# Patient Record
Sex: MALE | Race: WHITE | NOT HISPANIC OR LATINO | Employment: OTHER | URBAN - METROPOLITAN AREA
[De-identification: names, ages, dates, MRNs, and addresses within clinical notes are randomized per-mention and may not be internally consistent; named-entity substitution may affect disease eponyms.]

---

## 2017-04-17 ENCOUNTER — ALLSCRIPTS OFFICE VISIT (OUTPATIENT)
Dept: OTHER | Facility: OTHER | Age: 72
End: 2017-04-17

## 2017-06-07 DIAGNOSIS — Z12.5 ENCOUNTER FOR SCREENING FOR MALIGNANT NEOPLASM OF PROSTATE: ICD-10-CM

## 2017-06-13 ENCOUNTER — ALLSCRIPTS OFFICE VISIT (OUTPATIENT)
Dept: OTHER | Facility: OTHER | Age: 72
End: 2017-06-13

## 2017-08-28 ENCOUNTER — HOSPITAL ENCOUNTER (OUTPATIENT)
Dept: NON INVASIVE DIAGNOSTICS | Facility: CLINIC | Age: 72
Discharge: HOME | End: 2017-08-28
Payer: MEDICARE

## 2017-08-28 DIAGNOSIS — I65.29 OCCLUSION AND STENOSIS OF UNSPECIFIED CAROTID ARTERY: ICD-10-CM

## 2017-08-28 PROCEDURE — 93880 EXTRACRANIAL BILAT STUDY: CPT

## 2017-11-07 ENCOUNTER — ALLSCRIPTS OFFICE VISIT (OUTPATIENT)
Dept: OTHER | Facility: OTHER | Age: 72
End: 2017-11-07

## 2018-01-09 NOTE — PSYCH
Psych Med Mgmt    Appearance: was calm and cooperative, adequate hygiene and grooming and good eye contact  Observed mood: euthymic  Observed mood: affect was broad  Speech: a normal rate  Thought processes: coherent/organized  Hallucinations: no hallucinations present  Thought Content: no delusions  Abnormal Thoughts: The patient has no suicidal thoughts and no homicidal thoughts  Orientation: The patient is oriented to person, place and time  Recent and Remote Memory: short term memory intact and long term memory intact  Attention Span And Concentration: concentration intact  Insight: Insight intact  Judgment: His judgment was intact  Muscle Strength And Tone  Muscle strength and tone were normal    The patient is experiencing no localized pain  Patient seen for continuing care and pharmacotherapy  He is doing well and remains in full remission  No new health problems  He has a good relationship with his family  Sleep and appetite are normal  His cognition is intact    Assessment    1  Recurrent major depression in full remission (296 36) (F33 42)    Plan    1  ALPRAZolam 0 25 MG Oral Tablet; TAKE 1 TABLET DAILY   2  DULoxetine HCl - 60 MG Oral Capsule Delayed Release Particles (Cymbalta);   TAKE 1 CAPSULE DAILY    Review of Systems    Constitutional: No fever, no chills, feels well, no tiredness, no recent weight gain or loss  Cardiovascular: no complaints of slow or fast heart rate, no chest pain, no palpitations  Respiratory: no complaints of shortness of breath, no wheezing, no dyspnea on exertion  Gastrointestinal: no complaints of abdominal pain, no constipation, no nausea, no diarrhea, no vomiting  Genitourinary: no complaints of dysuria, no incontinence, no pelvic pain, no urinary frequency  Musculoskeletal: no complaints of arthralgia, no myalgias, no limb pain, no joint stiffness  Integumentary: no complaints of skin rash, no itching, no dry skin  Neurological: no complaints of headache, no confusion, no numbness, no dizziness  Active Problems    1  Abdominal pain (789 00) (R10 9)   2  Aneurysm of abdominal aorta (441 4) (I71 4)   3  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 2) (I71 2)   4  Aneurysm, 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)   5  Dissecting aortic aneurysm, 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)   6  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 01)   7  Encounter for prostate cancer screening (V76 44) (Z12 5)   8  Hypertension (401 9) (I10)   9  Obstructive sleep apnea (327 23) (G47 33)   10  Renal cyst, acquired (593 2) (N28 1)   11  Shortness of breath (786 05) (R06 02)   12  Status post thoracic aortic aneurysm repair (V45 89) (Z98 89,Z86 79)    Past Medical History    1  History of Anxiety (300 00) (F41 9)   2  History of Depression (311) (F32 9)   3  History of hyperlipidemia (V12 29) (Z86 39)   4  History of hypertension (V12 59) (Z86 79)   5  History of low back pain (V13 59) (Z87 39)   6  History of Nervous System Complication (924 98)    Allergies    1  Ativan TABS    Current Meds   1  ALPRAZolam 0 25 MG Oral Tablet; TAKE 1 TABLET DAILY; Therapy: 98NKH5969 to Recorded   2  Aspirin 81 MG Oral Tablet; TAKE 1 TABLET DAILY; Therapy: 83ERR3279 to Recorded   3  Atorvastatin Calcium 40 MG Oral Tablet; TAKE 1 TABLET DAILY; Therapy: 74RLJ2498 to (Evaluate:24Mar2016) Recorded   4  Cymbalta 60 MG Oral Capsule Delayed Release Particles; TAKE 1 CAPSULE DAILY; Therapy: 30MTT3220 to Recorded   5  Labetalol HCl - 100 MG Oral Tablet; 1/2 TABLET TWICE DAILY; Therapy: 56YVO6303 to Recorded   6  Omeprazole 40 MG Oral Capsule Delayed Release; TAKE 1 CAPSULE DAILY; Therapy: 41ZMX5865 to (Evaluate:24Mar2016) Recorded    Family Psych History    1  Family history of Stroke Syndrome (V17 1)    2   Family history of No Significant Family History    Social History    · Denied: History of Alcohol   · Daily Coffee Consumption (2  Cups/Day)   · Denied: Drug Use (707 90)   · Former smoker (T87 88) (W43 353)    End of Encounter Meds    1  ALPRAZolam 0 25 MG Oral Tablet; TAKE 1 TABLET DAILY; Therapy: 29VBQ6605 to (Evaluate:29Apr2016); Last Rx:30Mar2016 Ordered   2  DULoxetine HCl - 60 MG Oral Capsule Delayed Release Particles (Cymbalta); TAKE 1   CAPSULE DAILY; Therapy: 69JCO3549 to (Evaluate:25Mar2017)  Requested for: 59KHW0940; Last   Rx:30Mar2016 Ordered    3  Aspirin 81 MG Oral Tablet; TAKE 1 TABLET DAILY; Therapy: 12GTX3057 to Recorded   4  Atorvastatin Calcium 40 MG Oral Tablet; TAKE 1 TABLET DAILY; Therapy: 99AXE0558 to (Evaluate:24Mar2016) Recorded   5  Labetalol HCl - 100 MG Oral Tablet; 1/2 TABLET TWICE DAILY; Therapy: 08MWU1484 to Recorded   6  Omeprazole 40 MG Oral Capsule Delayed Release; TAKE 1 CAPSULE DAILY;    Therapy: 13ORV0612 to (Evaluate:24Mar2016) Recorded    Signatures   Electronically signed by : Tali Hills MD; Mar 30 2016  4:23PM EST                       (Author)

## 2018-01-12 VITALS
HEIGHT: 71 IN | DIASTOLIC BLOOD PRESSURE: 80 MMHG | SYSTOLIC BLOOD PRESSURE: 122 MMHG | WEIGHT: 229 LBS | HEART RATE: 76 BPM | BODY MASS INDEX: 32.06 KG/M2

## 2018-01-12 NOTE — PSYCH
Psych Med Mgmt    Appearance: was calm and cooperative, adequate hygiene and grooming and good eye contact  Observed mood: euthymic  Observed mood: affect was broad  Speech: a normal rate  Thought processes: coherent/organized  Hallucinations: no hallucinations present  Thought Content: no delusions  Abnormal Thoughts: The patient has no suicidal thoughts and no homicidal thoughts  Orientation: The patient is oriented to person, place and time  Recent and Remote Memory: short term memory intact and long term memory intact  Attention Span And Concentration: concentration intact  Insight: Insight intact  Judgment: His judgment was intact  Muscle Strength And Tone  Muscle strength and tone were normal  Normal gait and station  The patient is experiencing no localized pain  Treatment Recommendations: no change  RTO in 6 month  He reports normal appetite, normal energy level, no weight change and normal number of sleep hours  seen for depression  Remains in full remission  Other than vitamin D def he has not had any new health problems  sleeping and eating well  No psychotic symptom  Assessment    1  Vitamin D deficiency (268 9) (E55 9)    Plan    1  ALPRAZolam 0 25 MG Oral Tablet; TAKE 1 TABLET DAILY   2  DULoxetine HCl - 60 MG Oral Capsule Delayed Release Particles (Cymbalta);   take 1 capsule by mouth once daily    3  Vitamin D3 5000 UNIT Oral Tablet Dispersible; One a day   4  Vitamin D3 83291 UNIT Oral Capsule; one every week    Review of Systems    Constitutional: No fever, no chills, feels well, no tiredness, no recent weight gain or loss  Cardiovascular: no complaints of slow or fast heart rate, no chest pain, no palpitations  Respiratory: no complaints of shortness of breath, no wheezing, no dyspnea on exertion  Gastrointestinal: no complaints of abdominal pain, no constipation, no nausea, no diarrhea, no vomiting     Musculoskeletal: no complaints of arthralgia, no myalgias, no limb pain, no joint stiffness  Integumentary: no complaints of skin rash, no itching, no dry skin  Neurological: no complaints of headache, no confusion, no numbness, no dizziness  Active Problems    1  Abdominal pain (789 00) (R10 9)   2  Aneurysm of abdominal aorta (441 4) (I71 4)   3  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 2) (I71 2)   4  Aneurysm, 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)   5  Carotid atherosclerosis (433 10) (I65 29)   6  Dissecting aortic aneurysm, 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)   7  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 01)   8  Encounter for prostate cancer screening (V76 44) (Z12 5)   9  Hypertension (401 9) (I10)   10  Obstructive sleep apnea (327 23) (G47 33)   11  Prominent popliteal pulse (785 9) (R09 89)   12  Recurrent major depression in full remission (296 36) (F33 42)   13  Renal cyst, acquired (593 2) (N28 1)   14  Shortness of breath (786 05) (R06 02)   15  Status post thoracic aortic aneurysm repair (V45 89) (Z98 890,Z86 79)    Past Medical History    1  History of Anxiety (300 00) (F41 9)   2  History of Depression (311) (F32 9)   3  History of hyperlipidemia (V12 29) (Z86 39)   4  History of hypertension (V12 59) (Z86 79)   5  History of low back pain (V13 59) (Z87 39)   6  History of Nervous System Complication (928 61)    Allergies    1  Ativan TABS    Current Meds   1  ALPRAZolam 0 25 MG Oral Tablet; TAKE 1 TABLET DAILY; Therapy: 20RDK0976 to (Evaluate:01Nmk7068); Last Rx:45Ste3574 Ordered   2  Aspirin 81 MG TABS; TAKE 1 TABLET DAILY; Therapy: 47VXF3822 to Recorded   3  Atorvastatin Calcium 40 MG Oral Tablet; TAKE 1 TABLET DAILY; Therapy: 69HUS1533 to (Evaluate:24Mar2016) Recorded   4  DULoxetine HCl - 60 MG Oral Capsule Delayed Release Particles; take 1 capsule by   mouth once daily; Therapy: 24CBU3193 to (Braden Perrin)  Requested for: 55VJD7718; Last   Rx:07Mar2017 Ordered   5   Labetalol HCl - 100 MG Oral Tablet; 1/2 TABLET TWICE DAILY; Therapy: 56LIX2015 to Recorded   6  Omeprazole 40 MG Oral Capsule Delayed Release; TAKE 1 CAPSULE DAILY; Therapy: 23ONI8189 to (Evaluate:24Mar2016) Recorded    Family Psych History  Mother    1  Family history of Stroke Syndrome (V17 1)  Family History    2  Family history of No Significant Family History    Social History    · Denied: History of Alcohol   · Daily Coffee Consumption (2  Cups/Day)   · Denied: Drug Use (305 90)   · Former smoker (V15 82) (N83 474)    End of Encounter Meds    1  ALPRAZolam 0 25 MG Oral Tablet; TAKE 1 TABLET DAILY; Therapy: 19DWD7039 to (Evaluate:14Oct2017); Last Rx:05Rjo8925 Ordered   2  DULoxetine HCl - 60 MG Oral Capsule Delayed Release Particles (Cymbalta); take 1   capsule by mouth once daily; Therapy: 42PXW9617 to (Evaluate:12Apr2018)  Requested for: 43Ikl9080; Last   Rx:77Wgp8702 Ordered    3  Vitamin D3 5000 UNIT Oral Tablet Dispersible; One a day; Therapy: 22Bew2560 to (Last Rx:28Xok4976) Ordered   4  Vitamin D3 44842 UNIT Oral Capsule; one every week; Therapy: 91Qvt7423 to (Last Rx:32Efh7367) Ordered    5  Aspirin 81 MG TABS; TAKE 1 TABLET DAILY; Therapy: 98NKQ2807 to Recorded   6  Atorvastatin Calcium 40 MG Oral Tablet; TAKE 1 TABLET DAILY; Therapy: 57OKT6165 to (Evaluate:24Mar2016) Recorded   7  Labetalol HCl - 100 MG Oral Tablet; 1/2 TABLET TWICE DAILY; Therapy: 22XPG5728 to Recorded   8  Omeprazole 40 MG Oral Capsule Delayed Release; TAKE 1 CAPSULE DAILY;    Therapy: 67KCN7402 to (Evaluate:24Mar2016) Recorded    Future Appointments    Date/Time Provider Specialty Site   06/13/2017 02:45 PM Bhavana Kothari [facility] Urology [address] [facility] [address] [facility]     Signatures   Electronically signed by : Shelbi Alejo MD; Apr 17 2017  4:08PM EST                       (Author)

## 2018-01-16 NOTE — PSYCH
Psych Med Mgmt    Appearance: was calm and cooperative, adequate hygiene and grooming and good eye contact  Observed mood: affect was broad  Speech: a normal rate and fluent  Thought processes: coherent/organized  Hallucinations: no hallucinations present  Thought Content: no delusions  Abnormal Thoughts: The patient has no suicidal thoughts and no homicidal thoughts  Orientation: The patient is oriented to person, place and time  Recent and Remote Memory: short term memory intact and long term memory intact  Attention Span And Concentration: concentration intact  Insight: Insight intact  Judgment: His judgment was intact  Muscle Strength And Tone  Muscle strength and tone were normal  Normal gait and station  Language: no difficulty naming common objects  The patient is experiencing no localized pain  Treatment Recommendations: continue with same meds an RTO in 6 months  Risks, Benefits And Possible Side Effects Of Medications: Risks, benefits, and possible side effects of medications explained to patient and patient verbalizes understanding  He reports normal appetite, normal energy level, no weight change and normal number of sleep hours  seen for recurrent depression  He remains in full remission  Sleeps well but his appetite has diminished in the past several years  Keeps himself busy around the house  Still involved in hi-school sports  Good relationship with the family  Assessment    1  Recurrent major depression in full remission (296 36) (F33 42)    Plan    1  ALPRAZolam 0 25 MG Oral Tablet; TAKE 1 TABLET DAILY   2  DULoxetine HCl - 60 MG Oral Capsule Delayed Release Particles (Cymbalta);   take 1 capsule by mouth once daily    Review of Systems    Constitutional: No fever, no chills, feels well, no tiredness, no recent weight gain or loss  Cardiovascular: no complaints of slow or fast heart rate, no chest pain, no palpitations     Respiratory: no complaints of shortness of breath, no wheezing, no dyspnea on exertion  Gastrointestinal: no complaints of abdominal pain, no constipation, no nausea, no diarrhea, no vomiting  Genitourinary: no complaints of dysuria, no incontinence, no pelvic pain, no urinary frequency  Musculoskeletal: no complaints of arthralgia, no myalgias, no limb pain, no joint stiffness  Integumentary: no complaints of skin rash, no itching, no dry skin  Neurological: no complaints of headache, no confusion, no numbness, no dizziness  Active Problems    1  Abdominal pain (789 00) (R10 9)   2  Aneurysm of abdominal aorta (441 4) (I71 4)   3  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 2) (I71 2)   4  Aneurysm, 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)   5  Carotid atherosclerosis (433 10) (I65 29)   6  Dissecting aortic aneurysm, 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)   7  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 01)   8  Encounter for prostate cancer screening (V76 44) (Z12 5)   9  Hypertension (401 9) (I10)   10  Obstructive sleep apnea (327 23) (G47 33)   11  Prominent popliteal pulse (785 9) (R09 89)   12  Recurrent major depression in full remission (296 36) (F33 42)   13  Renal cyst, acquired (593 2) (N28 1)   14  Shortness of breath (786 05) (R06 02)   15  Status post thoracic aortic aneurysm repair (V45 89) (Z98 890,Z86 79)   16  Vitamin D deficiency (268 9) (E55 9)    Past Medical History    1  History of Anxiety (300 00) (F41 9)   2  History of Depression (311) (F32 9)   3  History of hyperlipidemia (V12 29) (Z86 39)   4  History of hypertension (V12 59) (Z86 79)   5  History of low back pain (V13 59) (Z87 39)   6  History of Nervous System Complication (576 33)    Allergies    1  Ativan TABS    Current Meds   1  ALPRAZolam 0 25 MG Oral Tablet; TAKE 1 TABLET DAILY; Therapy: 13NAD1043 to (Evaluate:97Qrj9543); Last Rx:28Kca9422 Ordered   2  Aspirin 81 MG TABS; TAKE 1 TABLET DAILY; Therapy: 50TBS8579 to Recorded   3   Atorvastatin Calcium 40 MG Oral Tablet; TAKE 1 TABLET DAILY; Therapy: 17DIZ8126 to (Evaluate:24Mar2016) Recorded   4  DULoxetine HCl - 60 MG Oral Capsule Delayed Release Particles; take 1 capsule by   mouth once daily; Therapy: 66ZLA9547 to (Evaluate:12Apr2018)  Requested for: 92Qdm5821; Last   Rx:17Apr2017 Ordered   5  Labetalol HCl - 100 MG Oral Tablet; 1/2 TABLET TWICE DAILY; Therapy: 17UOP6158 to Recorded   6  Omeprazole 40 MG Oral Capsule Delayed Release; TAKE 1 CAPSULE DAILY; Therapy: 44YQS5373 to (Evaluate:24Mar2016) Recorded   7  Vitamin D3 5000 UNIT Oral Tablet Disintegrating; One a day; Therapy: 54Wyj1765 to (Last Rx:17Apr2017) Ordered   8  Vitamin D3 35057 UNIT Oral Capsule; one every week; Therapy: 29Kar8491 to (Last Rx:17Apr2017) Ordered    Family Psych History  Mother    1  Family history of Stroke Syndrome (V17 1)  Family History    2  Family history of No Significant Family History    Social History    · Denied: History of Alcohol   · Daily Coffee Consumption (2  Cups/Day)   · Denied: Drug Use (305 90)   · Former smoker (V15 82) (Q26 108)    End of Encounter Meds    1  ALPRAZolam 0 25 MG Oral Tablet; TAKE 1 TABLET DAILY; Therapy: 73VRK0005 to (Evaluate:06May2018); Last Rx:07Nov2017 Ordered   2  DULoxetine HCl - 60 MG Oral Capsule Delayed Release Particles (Cymbalta); take 1   capsule by mouth once daily; Therapy: 22ESA6300 to (Iram Del Cid)  Requested for: 28LVY7152; Last   Rx:07Nov2017 Ordered    3  Vitamin D3 5000 UNIT Oral Tablet Disintegrating; One a day; Therapy: 36Gqq9623 to (Last Rx:17Apr2017) Ordered   4  Vitamin D3 25439 UNIT Oral Capsule; one every week; Therapy: 84Ayz1546 to (Last Rx:17Apr2017) Ordered    5  Aspirin 81 MG TABS; TAKE 1 TABLET DAILY; Therapy: 70LOI4708 to Recorded   6  Atorvastatin Calcium 40 MG Oral Tablet; TAKE 1 TABLET DAILY; Therapy: 74ZDD7932 to (Evaluate:24Mar2016) Recorded   7  Labetalol HCl - 100 MG Oral Tablet; 1/2 TABLET TWICE DAILY;    Therapy: 24GYA7019 to Recorded   8  Omeprazole 40 MG Oral Capsule Delayed Release; TAKE 1 CAPSULE DAILY;    Therapy: 11DDL1443 to (Evaluate:24Mar2016) Recorded    Signatures   Electronically signed by : Jonathan Carson MD; Nov 7 2017  4:49PM EST                       (Author)

## 2018-01-17 NOTE — PSYCH
Psych Med Mgmt    Treatment Recommendations: same meds and return to the office in 6 months  Risks, Benefits And Possible Side Effects Of Medications: Risks, benefits, and possible side effects of medications explained to patient and patient verbalizes understanding  He reports normal appetite, normal energy level, no weight change and normal number of sleep hours  seen for recurrent depression  Has remained in remission  No new health problems  sleeping and eating well  no psychotic experiences  stable family relationships  no side effects with meds  Assessment    1  Recurrent major depression in full remission (296 36) (F33 42)    Plan    1  ALPRAZolam 0 25 MG Oral Tablet; TAKE 1 TABLET DAILY   2  DULoxetine HCl - 60 MG Oral Capsule Delayed Release Particles (Cymbalta);   TAKE 1 CAPSULE DAILY    Review of Systems    Constitutional: No fever, no chills, feels well, no tiredness, no recent weight gain or loss  Cardiovascular: no complaints of slow or fast heart rate, no chest pain, no palpitations  Respiratory: no complaints of shortness of breath, no wheezing, no dyspnea on exertion  Gastrointestinal: no complaints of abdominal pain, no constipation, no nausea, no diarrhea, no vomiting  Genitourinary: no complaints of dysuria, no incontinence, no pelvic pain, no urinary frequency  Musculoskeletal: no complaints of arthralgia, no myalgias, no limb pain, no joint stiffness  Integumentary: no complaints of skin rash, no itching, no dry skin  Active Problems    1  Abdominal pain (789 00) (R10 9)   2  Aneurysm of abdominal aorta (441 4) (I71 4)   3  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 2) (I71 2)   4  Aneurysm, 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)   5  Carotid atherosclerosis (433 10) (I65 29)   6  Dissecting aortic aneurysm, 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)   7  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 01)   8  Encounter for prostate cancer screening (V76 44) (Z12 5)   9   Hypertension (401  9) (I10)   10  Obstructive sleep apnea (327 23) (G47 33)   11  Prominent popliteal pulse (785 9) (R09 89)   12  Recurrent major depression in full remission (296 36) (F33 42)   13  Renal cyst, acquired (593 2) (N28 1)   14  Shortness of breath (786 05) (R06 02)   15  Status post thoracic aortic aneurysm repair (V45 89) (Z98 890,Z86 79)    Past Medical History    1  History of Anxiety (300 00) (F41 9)   2  History of Depression (311) (F32 9)   3  History of hyperlipidemia (V12 29) (Z86 39)   4  History of hypertension (V12 59) (Z86 79)   5  History of low back pain (V13 59) (Z87 39)   6  History of Nervous System Complication (688 18)    Allergies    1  Ativan TABS    Current Meds   1  ALPRAZolam 0 25 MG Oral Tablet; TAKE 1 TABLET DAILY; Therapy: 33NSS0040 to (Evaluate:29Apr2016); Last Rx:30Mar2016 Ordered   2  Aspirin 81 MG TABS; TAKE 1 TABLET DAILY; Therapy: 27ALV3404 to Recorded   3  Atorvastatin Calcium 40 MG Oral Tablet; TAKE 1 TABLET DAILY; Therapy: 43ZUE4633 to (Evaluate:24Mar2016) Recorded   4  DULoxetine HCl - 60 MG Oral Capsule Delayed Release Particles; TAKE 1 CAPSULE   DAILY; Therapy: 93DQX4408 to (Evaluate:25Mar2017)  Requested for: 56BOL0722; Last   Rx:30Mar2016 Ordered   5  Labetalol HCl - 100 MG Oral Tablet; 1/2 TABLET TWICE DAILY; Therapy: 34DPZ8460 to Recorded   6  Omeprazole 40 MG Oral Capsule Delayed Release; TAKE 1 CAPSULE DAILY; Therapy: 11RLS1677 to (Evaluate:24Mar2016) Recorded    Family Psych History  Mother    1  Family history of Stroke Syndrome (V17 1)  Family History    2  Family history of No Significant Family History    Social History    · Denied: History of Alcohol   · Daily Coffee Consumption (2  Cups/Day)   · Denied: Drug Use (305 90)   · Former smoker (V15 82) (R62 818)    End of Encounter Meds    1  ALPRAZolam 0 25 MG Oral Tablet; TAKE 1 TABLET DAILY; Therapy: 79ZDG8980 to (Evaluate:22Apr2017); Last Rx:24Oct2016 Ordered   2   DULoxetine HCl - 60 MG Oral Capsule Delayed Release Particles (Cymbalta); TAKE 1   CAPSULE DAILY; Therapy: 89GTF2151 to (Evaluate:19Oct2017)  Requested for: 76LBX4117; Last   Rx:24Oct2016 Ordered    3  Aspirin 81 MG TABS; TAKE 1 TABLET DAILY; Therapy: 79XAZ6170 to Recorded   4  Atorvastatin Calcium 40 MG Oral Tablet; TAKE 1 TABLET DAILY; Therapy: 74KPY0728 to (Evaluate:24Mar2016) Recorded   5  Labetalol HCl - 100 MG Oral Tablet; 1/2 TABLET TWICE DAILY; Therapy: 49SEE4482 to Recorded   6  Omeprazole 40 MG Oral Capsule Delayed Release; TAKE 1 CAPSULE DAILY;    Therapy: 02MLR8129 to (Evaluate:24Mar2016) Recorded    Future Appointments    Date/Time Provider Specialty Site   06/13/2017 02:45 PM Ellie Pandya [facility] Urology ST [address] [place] [address] [address] [facility]     Signatures   Electronically signed by : Consuelo Rosario MD; Oct 24 2016  3:07PM EST                       (Author)

## 2018-03-20 DIAGNOSIS — Z12.5 ENCOUNTER FOR SCREENING FOR MALIGNANT NEOPLASM OF PROSTATE: ICD-10-CM

## 2018-04-13 ENCOUNTER — TRANSCRIBE ORDERS (OUTPATIENT)
Dept: VASCULAR SURGERY | Facility: CLINIC | Age: 73
End: 2018-04-13

## 2018-04-13 ENCOUNTER — TELEPHONE (OUTPATIENT)
Dept: VASCULAR SURGERY | Facility: CLINIC | Age: 73
End: 2018-04-13

## 2018-04-13 DIAGNOSIS — R09.89 ABNORMAL CHEST SOUNDS: Primary | ICD-10-CM

## 2018-04-13 DIAGNOSIS — I71.03 DISSECTION OF AORTA, THORACOABDOMINAL (HCC): Primary | ICD-10-CM

## 2018-04-13 DIAGNOSIS — I73.9 PERIPHERAL ARTERIAL DISEASE (HCC): ICD-10-CM

## 2018-04-24 ENCOUNTER — TELEPHONE (OUTPATIENT)
Dept: PSYCHIATRY | Facility: CLINIC | Age: 73
End: 2018-04-24

## 2018-04-24 ENCOUNTER — TELEPHONE (OUTPATIENT)
Dept: PULMONOLOGY | Facility: CLINIC | Age: 73
End: 2018-04-24

## 2018-04-24 NOTE — TELEPHONE ENCOUNTER
S/W Maris from [facility]  Requested Pt's CPAP compliance report and she state it is downloaded from the data card

## 2018-04-26 ENCOUNTER — OFFICE VISIT (OUTPATIENT)
Dept: PULMONOLOGY | Facility: CLINIC | Age: 73
End: 2018-04-26
Payer: MEDICARE

## 2018-04-26 VITALS
TEMPERATURE: 97.6 F | OXYGEN SATURATION: 96 % | SYSTOLIC BLOOD PRESSURE: 122 MMHG | BODY MASS INDEX: 32.14 KG/M2 | HEIGHT: 71 IN | WEIGHT: 229.6 LBS | DIASTOLIC BLOOD PRESSURE: 62 MMHG | HEART RATE: 62 BPM

## 2018-04-26 DIAGNOSIS — G47.33 OSA (OBSTRUCTIVE SLEEP APNEA): Primary | ICD-10-CM

## 2018-04-26 PROCEDURE — 99213 OFFICE O/P EST LOW 20 MIN: CPT | Performed by: INTERNAL MEDICINE

## 2018-04-26 RX ORDER — CLARITHROMYCIN 500 MG/1
TABLET, COATED ORAL
Refills: 0 | COMMUNITY
Start: 2018-03-23 | End: 2018-04-26

## 2018-04-26 RX ORDER — LABETALOL 100 MG/1
0.5 TABLET, FILM COATED ORAL 2 TIMES DAILY
COMMUNITY
Start: 2015-03-30 | End: 2021-08-23 | Stop reason: SDUPTHER

## 2018-04-26 RX ORDER — LABETALOL 200 MG/1
TABLET, FILM COATED ORAL
Refills: 0 | COMMUNITY
Start: 2018-03-28 | End: 2018-04-26

## 2018-04-26 RX ORDER — DULOXETIN HYDROCHLORIDE 60 MG/1
CAPSULE, DELAYED RELEASE ORAL
Refills: 0 | COMMUNITY
Start: 2018-03-13 | End: 2018-12-07 | Stop reason: SDUPTHER

## 2018-04-26 RX ORDER — ATORVASTATIN CALCIUM 40 MG/1
40 TABLET, FILM COATED ORAL DAILY
Refills: 0 | COMMUNITY
Start: 2018-04-09

## 2018-04-26 RX ORDER — CHOLECALCIFEROL (VITAMIN D3) 1250 MCG
50000 CAPSULE ORAL WEEKLY
COMMUNITY
Start: 2017-04-17 | End: 2022-01-19 | Stop reason: ALTCHOICE

## 2018-04-26 RX ORDER — ALPRAZOLAM 0.25 MG/1
TABLET ORAL
Refills: 0 | COMMUNITY
Start: 2018-02-04 | End: 2018-10-22 | Stop reason: SDUPTHER

## 2018-04-26 NOTE — PROGRESS NOTES
Office Progress Note - Pulmonary    Gar Lorne  75 y o  male MRN: [id number]    Encounter: [id number]      Assessment:  · Obstructive sleep apnea  Well treated  Plan:   · I have reordered a new CPAP machine for him  · Continue nocturnal CPAP  · Follow-up in 1 year  Discussion:   The patient has obstructive sleep apnea is well treated  The patient is very compliant with nocturnal CPAP  I have mentioned him if he notices any symptoms of tiredness and hypersomnolence during the day he at that point he may need to have a CPAP titration study  I will see him in 1 year in a follow-up visit  Subjective: The patient is here for a follow-up visit  He is doing fairly well  He denies any chest pain or palpitations  No cough or sputum production  In the winter he had severe bronchitis which lasted for 2 weeks  He was treated with antibiotics  He is compliant with his nocturnal CPAP  He denies daytime hypersomnolence  Review of systems:  A 12 point system review is done and aside from what is stated above the rest of the review of systems is negative  Vitals: Blood pressure 122/62, pulse 62, temperature 97 6 °F (36 4 °C), temperature source Tympanic, height 5' 11" (1 803 m), weight 104 kg (229 lb 9 6 oz), SpO2 96 %  ,     Physical Exam  Gen: Awake, alert, oriented x 3, no acute distress  HEENT: Mucous membranes moist, no oral lesions, no thrush  NECK: No accessory muscle use, JVP not elevated  Cardiac: Regular, single S1, single S2, no murmurs, no rubs, no gallops  Lungs:  Clear breath sounds  Abdomen: normoactive bowel sounds, soft nontender, nondistended, no rebound or rigidity, no guarding  Extremities: no cyanosis, no clubbing, no edema  Neuro:  Grossly nonfocal   Skin:  No rash

## 2018-04-27 ENCOUNTER — DOCUMENTATION (OUTPATIENT)
Dept: PULMONOLOGY | Facility: CLINIC | Age: 73
End: 2018-04-27

## 2018-05-08 NOTE — TELEPHONE ENCOUNTER
Tried contacting the patient several times with no luck  I sent out a letter for the patient to give us a call and schedule his ultrasound that he is due for

## 2018-05-15 DIAGNOSIS — I65.23 BILATERAL CAROTID ARTERY STENOSIS: Primary | ICD-10-CM

## 2018-05-17 ENCOUNTER — TELEPHONE (OUTPATIENT)
Dept: PULMONOLOGY | Facility: CLINIC | Age: 73
End: 2018-05-17

## 2018-05-31 ENCOUNTER — OFFICE VISIT (OUTPATIENT)
Dept: PSYCHIATRY | Facility: CLINIC | Age: 73
End: 2018-05-31
Payer: MEDICARE

## 2018-05-31 DIAGNOSIS — F33.42 RECURRENT MAJOR DEPRESSION IN FULL REMISSION (HCC): Primary | ICD-10-CM

## 2018-05-31 PROCEDURE — 99213 OFFICE O/P EST LOW 20 MIN: CPT | Performed by: PSYCHIATRY & NEUROLOGY

## 2018-05-31 NOTE — PSYCH
Patient ID: Kameron Sinha  is a 75 y o  male  HPI ROS Appetite Changes and Sleep: normal appetite, normal energy level, no weight change and normal number of sleep hours    Review Of Systems:     Mood Normal   Thought Content Normal   General Normal    Gastrointestinal Normal   Neurological Normal        Laboratory Results: No results found for this or any previous visit  Subjective:   doing well and has no complaints  sleeping and eating well    Good relationship with his family  No health problems        Objective:   Remains in remission    Mental status:  Appearance calm and cooperative , adequate hygiene and grooming and good eye contact    Mood euthymic   Affect affect appropriate    Speech Normal rate and Normal volume   Thought Processes coherent/organized   Hallucinations no hallucinations present    Thought Content no delusional thoughts verbalized   Abnormal Thoughts no suicidal thoughts    Orientation A+O x 3   Memory function Not tested   Attention Span concentration intact   Intellect Not Formally Assessed   Insight Insight intact   Judgement judgment was intact   Muscle Strength Muscle strength and tone were normal and Normal gait    Language no difficulty naming common objects and no difficulty repeating a phrase    Pain none       Assessment/Plan:       Diagnoses and all orders for this visit:    Recurrent major [facility] full [facility] ([facility] )            Treatment Recommendations- Risks Benefits      Risks, Benefits And Possible Side Effects Of Medications:  Risks, benefits, and possible side effects of medications explained to patient and patient verbalizes understanding

## 2018-06-13 ENCOUNTER — HOSPITAL ENCOUNTER (OUTPATIENT)
Dept: NON INVASIVE DIAGNOSTICS | Facility: CLINIC | Age: 73
Discharge: HOME | End: 2018-06-13
Payer: MEDICARE

## 2018-06-13 DIAGNOSIS — I71.03 DISSECTION OF AORTA, THORACOABDOMINAL (HCC): ICD-10-CM

## 2018-06-13 DIAGNOSIS — I73.9 PERIPHERAL ARTERIAL DISEASE (HCC): ICD-10-CM

## 2018-06-13 PROCEDURE — 93922 UPR/L XTREMITY ART 2 LEVELS: CPT | Performed by: SURGERY

## 2018-06-13 PROCEDURE — 93925 LOWER EXTREMITY STUDY: CPT | Performed by: SURGERY

## 2018-06-13 PROCEDURE — 93925 LOWER EXTREMITY STUDY: CPT

## 2018-06-13 PROCEDURE — 93923 UPR/LXTR ART STDY 3+ LVLS: CPT

## 2018-06-13 PROCEDURE — 93978 VASCULAR STUDY: CPT

## 2018-06-13 PROCEDURE — 93975 VASCULAR STUDY: CPT | Performed by: SURGERY

## 2018-06-27 NOTE — PROGRESS NOTES
6/28/2018    [address] [place] [address]   3/68/1869  [id number]    Discussion and Plan    Patient continues to do well from a urinary standpoint  Provided scripts for PSA now and again in 1 year to ensure copies are delivered  We will otherwise return then for routine examination  All questions answered at this time  1  Renal cyst, acquired  - PSA Total, Diagnostic; Future  - PSA Total, Diagnostic; Future    Assessment      Patient Active Problem List   Diagnosis    Recurrent major depression in full remission ([facility] )    Renal cyst, acquired       History of Present Illness    Patient is seen in follow-up for an established history of BPH, renal cyst, and prostate cancer screening  He denies any changes in urinary pattern noting good flow with complete bladder emptying sensation  No nocturia  Denies interval hematuria urinary tract infections  States his PSAs have been checked by primary physician  I have no records available for review  Urinary Symptom Assessment        Past Medical History  Past Medical History:   Diagnosis Date    Anxiety     Depression     Hyperlipidemia     Hypertension     Kidney stone     Renal cyst     Renal cyst     Sleep apnea        Past Social History  Past Surgical History:   Procedure Laterality Date    ABDOMINAL AORTIC ANEURYSM REPAIR      CELIAC ARTERY STENT      KIDNEY STONE SURGERY         Past Family History  Family History   Problem Relation Age of Onset    Stroke Mother     Prostate cancer Father        Past Social history  Social History     Social History    Marital status: /Civil Union     Spouse name: N/A    Number of children: N/A    Years of education: N/A     Occupational History    Not on file       Social History Main Topics    Smoking status: Former Smoker     Packs/day: 1 00     Years: 24 00     Types: Cigarettes     Start date: 4/26/1959     Quit date: 4/26/1983    Smokeless tobacco: Never Used    Alcohol use No    Drug use: No    Sexual activity: No     Other Topics Concern    Not on file     Social History Narrative    No narrative on file       Current Medications  Current Outpatient Prescriptions   Medication Sig Dispense Refill    ALPRAZolam (XANAX) 0 25 mg tablet   0    aspirin 81 MG tablet Take 1 tablet by mouth daily      atorvastatin (LIPITOR) 40 mg tablet Take 40 mg by mouth daily  0    Cholecalciferol (VITAMIN D3) 06520 units CAPS Take by mouth      DULoxetine (CYMBALTA) 60 mg delayed release capsule   0    labetalol (NORMODYNE) 100 mg tablet Take 0 5 tablets by mouth 2 (two) times a day      omeprazole (PriLOSEC) 40 MG capsule   0     No current facility-administered medications for this visit  Allergies  Allergies   Allergen Reactions    Lorazepam Hyperactivity       Past Medical History, Social History, Family History, medications and allergies were reviewed  Review of Systems  Review of Systems   Constitutional: Negative  HENT: Negative  Eyes: Negative  Respiratory: Negative  Cardiovascular: Negative  Gastrointestinal: Negative  Endocrine: Negative  Genitourinary: Negative for decreased urine volume, difficulty urinating, hematuria and urgency  Musculoskeletal: Negative  Skin: Negative  Neurological: Negative  Hematological: Negative  Psychiatric/Behavioral: Negative  Vitals  Vitals:    06/28/18 0750   BP: 105/70   Pulse: 68   Weight: 103 kg (228 lb)   Height: 5' 11" (1 803 m)         Physical Exam    Physical Exam   Constitutional: He is oriented to person, place, and time  He appears well-developed and well-nourished  HENT:   Head: Normocephalic and atraumatic  Eyes: Pupils are equal, round, and reactive to light  Neck: Normal range of motion  Cardiovascular: Normal rate, regular rhythm and normal heart sounds  Pulmonary/Chest: Effort normal and breath sounds normal  No accessory muscle usage  No respiratory distress  Abdominal: Soft   Normal appearance and bowel sounds are normal  There is no tenderness  Genitourinary: Rectum normal, prostate normal and penis normal  No penile tenderness  Genitourinary Comments: Prostate approximately 40 g  No nodules identified  Musculoskeletal: Normal range of motion  Neurological: He is alert and oriented to person, place, and time  Skin: Skin is warm, dry and intact  Psychiatric: He has a normal mood and affect  His speech is normal  Cognition and memory are normal    Nursing note and vitals reviewed        Results    Below listed labs, pathology results, and radiology images were personally reviewed:    No results found for: PSA  No results found for: GLUCOSE, CALCIUM, NA, K, CO2, CL, BUN, CREATININE  No results found for: WBC, HGB, HCT, MCV, PLT    No results found for this or any previous visit (from the past 1 hour(s)) ]

## 2018-06-28 ENCOUNTER — OFFICE VISIT (OUTPATIENT)
Dept: UROLOGY | Facility: CLINIC | Age: 73
End: 2018-06-28
Payer: MEDICARE

## 2018-06-28 VITALS
HEART RATE: 68 BPM | BODY MASS INDEX: 31.92 KG/M2 | DIASTOLIC BLOOD PRESSURE: 70 MMHG | HEIGHT: 71 IN | SYSTOLIC BLOOD PRESSURE: 105 MMHG | WEIGHT: 228 LBS

## 2018-06-28 DIAGNOSIS — N28.1 RENAL CYST, ACQUIRED: Primary | ICD-10-CM

## 2018-06-28 PROCEDURE — 99214 OFFICE O/P EST MOD 30 MIN: CPT | Performed by: UROLOGY

## 2018-06-28 RX ORDER — OMEPRAZOLE 40 MG/1
CAPSULE, DELAYED RELEASE ORAL
Refills: 0 | COMMUNITY
Start: 2018-04-30 | End: 2021-01-21

## 2018-07-24 ENCOUNTER — TRANSCRIBE ORDERS (OUTPATIENT)
Dept: ADMINISTRATIVE | Facility: HOSPITAL | Age: 73
End: 2018-07-24

## 2018-07-24 DIAGNOSIS — G44.001 INTRACTABLE CLUSTER HEADACHE SYNDROME, UNSPECIFIED CHRONICITY PATTERN: Primary | ICD-10-CM

## 2018-07-28 LAB
25(OH)D3 SERPL-MCNC: 59 NG/ML (ref 30–100)
ALBUMIN SERPL-MCNC: 3.9 G/DL (ref 3.6–5.1)
ALBUMIN/GLOB SERPL: 1.5 (CALC) (ref 1–2.5)
ALP SERPL-CCNC: 104 U/L (ref 40–115)
ALT SERPL-CCNC: 19 U/L (ref 9–46)
AST SERPL-CCNC: 22 U/L (ref 10–35)
BASOPHILS # BLD AUTO: 60 CELLS/UL (ref 0–200)
BASOPHILS NFR BLD AUTO: 1 %
BILIRUB SERPL-MCNC: 0.9 MG/DL (ref 0.2–1.2)
BUN SERPL-MCNC: 20 MG/DL (ref 7–25)
BUN/CREAT SERPL: 16 (CALC) (ref 6–22)
CALCIUM SERPL-MCNC: 9.4 MG/DL (ref 8.6–10.3)
CHLORIDE SERPL-SCNC: 102 MMOL/L (ref 98–110)
CHOLEST SERPL-MCNC: 140 MG/DL
CHOLEST/HDLC SERPL: 3.2 (CALC)
CO2 SERPL-SCNC: 31 MMOL/L (ref 20–31)
CREAT SERPL-MCNC: 1.25 MG/DL (ref 0.7–1.18)
EOSINOPHIL # BLD AUTO: 102 CELLS/UL (ref 15–500)
EOSINOPHIL NFR BLD AUTO: 1.7 %
ERYTHROCYTE [DISTWIDTH] IN BLOOD BY AUTOMATED COUNT: 12.1 % (ref 11–15)
GLOBULIN SER CALC-MCNC: 2.6 G/DL (CALC) (ref 1.9–3.7)
GLUCOSE SERPL-MCNC: 106 MG/DL (ref 65–99)
HBA1C MFR BLD: 5.7 % OF TOTAL HGB
HCT VFR BLD AUTO: 44.1 % (ref 38.5–50)
HDLC SERPL-MCNC: 44 MG/DL
HGB BLD-MCNC: 15.2 G/DL (ref 13.2–17.1)
LDLC SERPL CALC-MCNC: 75 MG/DL (CALC)
LYMPHOCYTES # BLD AUTO: 1134 CELLS/UL (ref 850–3900)
LYMPHOCYTES NFR BLD AUTO: 18.9 %
MCH RBC QN AUTO: 31.7 PG (ref 27–33)
MCHC RBC AUTO-ENTMCNC: 34.5 G/DL (ref 32–36)
MCV RBC AUTO: 92.1 FL (ref 80–100)
MONOCYTES # BLD AUTO: 432 CELLS/UL (ref 200–950)
MONOCYTES NFR BLD AUTO: 7.2 %
NEUTROPHILS # BLD AUTO: 4272 CELLS/UL (ref 1500–7800)
NEUTROPHILS NFR BLD AUTO: 71.2 %
NONHDLC SERPL-MCNC: 96 MG/DL (CALC)
PLATELET # BLD AUTO: 220 THOUSAND/UL (ref 140–400)
PMV BLD REES-ECKER: 8.9 FL (ref 7.5–12.5)
POTASSIUM SERPL-SCNC: 4.6 MMOL/L (ref 3.5–5.3)
PROT SERPL-MCNC: 6.5 G/DL (ref 6.1–8.1)
PSA SERPL-MCNC: 3 NG/ML
RBC # BLD AUTO: 4.79 MILLION/UL (ref 4.2–5.8)
SL AMB EGFR AFRICAN AMERICAN: 65 ML/MIN/1.73M2
SL AMB EGFR NON AFRICAN AMERICAN: 56 ML/MIN/1.73M2
SODIUM SERPL-SCNC: 140 MMOL/L (ref 135–146)
TRIGL SERPL-MCNC: 133 MG/DL
WBC # BLD AUTO: 6 THOUSAND/UL (ref 3.8–10.8)

## 2018-08-02 ENCOUNTER — HOSPITAL ENCOUNTER (OUTPATIENT)
Dept: CT IMAGING | Facility: HOSPITAL | Age: 73
Discharge: HOME | End: 2018-08-02
Payer: MEDICARE

## 2018-08-02 DIAGNOSIS — G44.001 INTRACTABLE CLUSTER HEADACHE SYNDROME, UNSPECIFIED CHRONICITY PATTERN: ICD-10-CM

## 2018-08-02 PROCEDURE — 70450 CT HEAD/BRAIN W/O DYE: CPT

## 2018-08-07 ENCOUNTER — OFFICE VISIT (OUTPATIENT)
Dept: PULMONOLOGY | Facility: CLINIC | Age: 73
End: 2018-08-07
Payer: MEDICARE

## 2018-08-07 VITALS
BODY MASS INDEX: 32.27 KG/M2 | TEMPERATURE: 96.6 F | WEIGHT: 230.5 LBS | HEIGHT: 71 IN | OXYGEN SATURATION: 94 % | HEART RATE: 67 BPM | SYSTOLIC BLOOD PRESSURE: 108 MMHG | DIASTOLIC BLOOD PRESSURE: 66 MMHG

## 2018-08-07 DIAGNOSIS — G47.30 SLEEP APNEA, UNSPECIFIED TYPE: Primary | ICD-10-CM

## 2018-08-07 PROCEDURE — 99213 OFFICE O/P EST LOW 20 MIN: CPT | Performed by: NURSE PRACTITIONER

## 2018-08-07 NOTE — ASSESSMENT & PLAN NOTE
Luis Asher  Has a diagnosis of obstructive sleep apnea and currently a received a new CPAP machine with a new nasal pillow mask  He was seen in the office today for compliance report  He reports being better able to tolerate his current CPAP nasal pillow mask and reports almost 100% compliance  Per complaints reports he has used for 29 of the last 30 days with a average use of 5 hr and 32 min, with an average time in large leak 6 sec, AHI 2 3  His CPAP currently set at 9 cmH20  He reports that he has noticed moderate improvement in daytime sleepiness and snoring  He does continue to have being mild difficulty wearing the mask at night and does typically take the mask off around 03:00  He plans to continue attempting to wear his CPAP mask for longer periods of time, as he has noticed some improvement with use      He will follow up 1 year with Dr Weiner Crest

## 2018-08-07 NOTE — PROGRESS NOTES
Pulmonary Follow Up Note   Chandana Moore  75 y o  male MRN: [id number]  8/7/2018      Assessment:    Sleep apnea   Mark Casas  Has a diagnosis of obstructive sleep apnea and currently a received a new CPAP machine with a new nasal pillow mask  He was seen in the office today for compliance report  He reports being better able to tolerate his current CPAP nasal pillow mask and reports almost 100% compliance  Per complaints reports he has used for 29 of the last 30 days with a average use of 5 hr and 32 min, with an average time in large leak 6 sec, AHI 2 3  His CPAP currently set at 9 cmH20  He reports that he has noticed moderate improvement in daytime sleepiness and snoring  He does continue to have being mild difficulty wearing the mask at night and does typically take the mask off around 03:00  He plans to continue attempting to wear his CPAP mask for longer periods of time, as he has noticed some improvement with use  He will follow up 1 year with Dr Merline Lower:    There are no diagnoses linked to this encounter  Return in about 1 year (around 8/7/2019), or if symptoms worsen or fail to improve  History of Present Illness   HPI:  Chandana Moore  is a 75 y o  male who  Was seen in the office today for follow-up for CPAP compliance  He was recently seen provided a new mask by Dr Gumaro Ivy  He is currently using his CPAP at 9 cmH20,  With a compliance of 90%  He reports moderate improvement in his daytime sleepiness as well as difficulty at night with sleeping  He does  Also report mild difficulty getting accustomed to mask although this is better than his previous full face masks  His only main complaint today is posterior headache extending to his  Anterior head  He is currently being evaluated by neurologist     He  Has completed a head CT which was negative for acute process  He denies further neurologic complaints        He currently denies:  Chest pain or pain inspiration, fevers, chills, night sweats, nausea, vomiting diarrhea headache, dizziness, bronchospasm, hemoptysis, cough, shortness of breath or dyspnea on exertion  Review of Systems   Constitutional: Negative for activity change, appetite change, chills, diaphoresis, fatigue, fever and unexpected weight change  HENT: Negative for postnasal drip, rhinorrhea, sinus pressure and trouble swallowing  Eyes: Negative for pain, discharge and itching  Respiratory: Negative for cough, choking, chest tightness, shortness of breath, wheezing and stridor  Cardiovascular: Negative for chest pain and leg swelling  Gastrointestinal: Negative for abdominal distention and abdominal pain  Endocrine: Negative for cold intolerance and heat intolerance  Genitourinary: Negative for difficulty urinating  Musculoskeletal: Negative for arthralgias and myalgias  Allergic/Immunologic: Negative for environmental allergies and food allergies  Neurological: Positive for headaches  Negative for dizziness, seizures, syncope, speech difficulty, weakness, light-headedness and numbness  Hematological: Does not bruise/bleed easily  Psychiatric/Behavioral: Negative for agitation  All other systems reviewed and are negative        Historical Information   Past Medical History:   Diagnosis Date    Anxiety     Depression     Hyperlipidemia     Hypertension     Kidney stone     Renal cyst     Renal cyst     Sleep apnea      Past Surgical History:   Procedure Laterality Date    ABDOMINAL AORTIC ANEURYSM REPAIR      CELIAC ARTERY STENT      KIDNEY STONE SURGERY       Family History   Problem Relation Age of Onset    Stroke Mother     Prostate cancer Father        History   Smoking Status    Former Smoker    Packs/day: 1 00    Years: 24 00    Types: Cigarettes    Start date: 4/26/1959   [place] Quit date: 4/26/1983   Smokeless Tobacco    Never Used         Meds/Allergies     Current Outpatient Prescriptions:   ALPRAZolam (XANAX) 0 25 mg tablet, , Disp: , Rfl: 0    aspirin 81 MG tablet, Take 1 tablet by mouth daily, Disp: , Rfl:     atorvastatin (LIPITOR) 40 mg tablet, Take 40 mg by mouth daily, Disp: , Rfl: 0    Cholecalciferol (VITAMIN D3) 60679 units CAPS, Take by mouth, Disp: , Rfl:     DULoxetine (CYMBALTA) 60 mg delayed release capsule, , Disp: , Rfl: 0    labetalol (NORMODYNE) 100 mg tablet, Take 0 5 tablets by mouth 2 (two) times a day, Disp: , Rfl:     omeprazole (PriLOSEC) 40 MG capsule, , Disp: , Rfl: 0  Allergies   Allergen Reactions    Lorazepam Hyperactivity       Vitals: Blood pressure 108/66, pulse 67, temperature (!) 96 6 °F (35 9 °C), temperature source Tympanic, height 5' 11" (1 803 m), weight 105 kg (230 lb 8 oz), SpO2 94 %  Body mass index is 32 15 kg/m²  Oxygen Therapy  SpO2: 94 %  Oxygen Therapy: None (Room air)    Physical Exam  Physical Exam   Constitutional: He is oriented to person, place, and time  He appears well-developed and well-nourished  No distress  HENT:   Head: Normocephalic and atraumatic  Eyes: EOM are normal  Pupils are equal, round, and reactive to light  Neck: Normal range of motion  Neck supple  No JVD present  Cardiovascular: Normal rate, regular rhythm, normal heart sounds and intact distal pulses  Exam reveals no gallop and no friction rub  No murmur heard  Pulmonary/Chest: Effort normal and breath sounds normal  He has no decreased breath sounds  He has no wheezes  He has no rhonchi  He has no rales  Abdominal: Soft  Musculoskeletal: Normal range of motion  He exhibits no edema  Lymphadenopathy:     He has no cervical adenopathy  Neurological: He is alert and oriented to person, place, and time  Skin: Skin is warm and dry  Psychiatric: He has a normal mood and affect  His behavior is normal  Judgment and thought content normal    Vitals reviewed  Labs: I have personally reviewed pertinent lab results    Lab Results   Component Value Date    HGB 15 2 07/27/2018    HCT 44 1 07/27/2018    MCV 92 1 07/27/2018     07/27/2018     Lab Results   Component Value Date    CALCIUM 9 4 07/27/2018    BUN 20 07/27/2018    CREATININE 1 25 (H) 07/27/2018     No results found for: IGE  No results found for: ALT, AST, GGT, ALKPHOS, BILITOT    Imaging and other studies:  none

## 2018-08-09 ENCOUNTER — TELEPHONE (OUTPATIENT)
Dept: NEUROLOGY | Facility: CLINIC | Age: 73
End: 2018-08-09

## 2018-08-09 NOTE — TELEPHONE ENCOUNTER
Scheduled appt 11/6/18 with Dr Juvencio Rojas in [place]  Mailed NP paperwork to pt's home  Also sent in basket message to Marleny Cordoba to see if Dr Mikayla Reid would see this pt for HA's, as the pt requested him  But I scheduled with Juvencio Rojas in the meantime

## 2018-08-27 ENCOUNTER — TELEPHONE (OUTPATIENT)
Dept: NEUROLOGY | Facility: CLINIC | Age: 73
End: 2018-08-27

## 2018-08-30 ENCOUNTER — TELEPHONE (OUTPATIENT)
Dept: NEUROLOGY | Facility: CLINIC | Age: 73
End: 2018-08-30

## 2018-09-05 ENCOUNTER — TELEPHONE (OUTPATIENT)
Dept: NEUROLOGY | Facility: CLINIC | Age: 73
End: 2018-09-05

## 2018-09-05 ENCOUNTER — OFFICE VISIT (OUTPATIENT)
Dept: NEUROLOGY | Facility: CLINIC | Age: 73
End: 2018-09-05
Payer: MEDICARE

## 2018-09-05 VITALS
SYSTOLIC BLOOD PRESSURE: 124 MMHG | HEART RATE: 70 BPM | DIASTOLIC BLOOD PRESSURE: 76 MMHG | BODY MASS INDEX: 32.08 KG/M2 | WEIGHT: 230 LBS

## 2018-09-05 DIAGNOSIS — M54.2 CERVICALGIA: Primary | ICD-10-CM

## 2018-09-05 DIAGNOSIS — M54.12 MYELOPATHY OF CERVICAL SPINAL CORD WITH CERVICAL RADICULOPATHY (HCC): ICD-10-CM

## 2018-09-05 DIAGNOSIS — G95.9 MYELOPATHY OF CERVICAL SPINAL CORD WITH CERVICAL RADICULOPATHY (HCC): ICD-10-CM

## 2018-09-05 PROCEDURE — 99213 OFFICE O/P EST LOW 20 MIN: CPT | Performed by: PSYCHIATRY & NEUROLOGY

## 2018-09-05 RX ORDER — ACETAMINOPHEN 80 MG/1
80 TABLET, CHEWABLE ORAL EVERY 4 HOURS PRN
COMMUNITY
End: 2021-02-08

## 2018-09-05 NOTE — PROGRESS NOTES
Patient ID: Mikayla Dunham  is a 75 y o  male  Assessment/Plan:    Brief episodes of transient left-sided neck pain most likely secondary to cervical spine bony disease however patient has diffuse arm and leg hyperreflexia warranting further evaluation of any compressive myelopathy  MRI has been ordered of the cervical spine in addition to a course of physical therapy    Subjective:    HPI    The patient is accompanied by his wife  He is a very pleasant 23-year-old right-handed gentleman who reports 2 months of very brief but intense pain predominantly over the left side of his neck extending from the upper cervical region up over the hemicranium  It can occur on the right  It is almost always with hyper flexion of the neck  He has not noted any bladder or bowel dysfunction arm or leg weakness  He has noted that this comes in several episodes typically after the morning hours when he is more active  When he bends over to pick something off the floor this can occur  There is no change in level of consciousness  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       Objective: There were no vitals taken for this visit  Physical Exam    Neurological Exam the patient walks without limp Rowdy antalgic quality  Cranial nerves are intact  There is no palpable tenderness over the greater occipital region  Reflexes are diffusely hyperreflexic biceps triceps knee jerks and ankle jerks  Toe signs are downgoing and no spasticity was noted strength was full      ROS:    Review of Systems   Constitutional: Positive for appetite change (Less)  HENT: Positive for ear pain (Right ear)  Eyes: Negative  Respiratory: Negative  Cardiovascular: Negative  Gastrointestinal: Negative  Endocrine: Negative  Genitourinary: Negative  Musculoskeletal: Positive for neck pain  Skin: Negative  Allergic/Immunologic: Negative  Neurological: Positive for dizziness (standing and bending ), light-headedness and headaches (Shooting pains getting from sneezing and also coughing)  Hematological: Negative  Psychiatric/Behavioral: Negative

## 2018-09-13 ENCOUNTER — TELEPHONE (OUTPATIENT)
Dept: ADMINISTRATIVE | Facility: HOSPITAL | Age: 73
End: 2018-09-13

## 2018-09-13 DIAGNOSIS — I65.23 BILATERAL CAROTID ARTERY STENOSIS: Primary | ICD-10-CM

## 2018-09-13 NOTE — TELEPHONE ENCOUNTER
Spoke with patient about scheduling doppler  He is having an MRI done on his neck  He requested that someone from the office call him again in 2 weeks to schedule the CV  He did not want to do it on the phone today

## 2018-09-19 ENCOUNTER — HOSPITAL ENCOUNTER (OUTPATIENT)
Dept: RADIOLOGY | Facility: HOSPITAL | Age: 73
Discharge: HOME | End: 2018-09-19
Attending: PSYCHIATRY & NEUROLOGY
Payer: MEDICARE

## 2018-09-19 DIAGNOSIS — M54.12 MYELOPATHY OF CERVICAL SPINAL CORD WITH CERVICAL RADICULOPATHY (HCC): ICD-10-CM

## 2018-09-19 DIAGNOSIS — G95.9 MYELOPATHY OF CERVICAL SPINAL CORD WITH CERVICAL RADICULOPATHY (HCC): ICD-10-CM

## 2018-09-19 PROCEDURE — 72141 MRI NECK SPINE W/O DYE: CPT

## 2018-09-20 ENCOUNTER — OFFICE VISIT (OUTPATIENT)
Dept: NEUROLOGY | Facility: CLINIC | Age: 73
End: 2018-09-20
Payer: MEDICARE

## 2018-09-20 VITALS
SYSTOLIC BLOOD PRESSURE: 140 MMHG | HEART RATE: 66 BPM | WEIGHT: 232 LBS | DIASTOLIC BLOOD PRESSURE: 60 MMHG | BODY MASS INDEX: 32.48 KG/M2 | HEIGHT: 71 IN

## 2018-09-20 DIAGNOSIS — M54.2 CERVICALGIA: Primary | ICD-10-CM

## 2018-09-20 PROCEDURE — 99212 OFFICE O/P EST SF 10 MIN: CPT | Performed by: PSYCHIATRY & NEUROLOGY

## 2018-09-20 NOTE — PROGRESS NOTES
Patient ID: Casimiro Treadwell  is a 75 y o  male  Assessment/Plan:     atypical left-sided neck pain with some neuralgic component as superiorly  MRI scan of the cervical spine was fairly unremarkable for any nerve impingement or spinal stenosis  This time I feel we can proceed cautiously and conservatively and I have recommended a course of physical therapy of the cervical spine  I plan to follow him up in 4-6 weeks  Subjective:    HPI     patient has had no substantive change in his complaints  He has companies by his wife today  He still has positionally inciting left-sided neck pain which radiates anteriorly and up to the temporal region   Objective: There were no vitals taken for this visit  Physical Exam    Neurological Exam      ROS:    Review of Systems   Constitutional: Negative  Negative for appetite change and fever  HENT: Negative  Negative for hearing loss, tinnitus, trouble swallowing and voice change  Eyes: Negative  Negative for photophobia and pain  Respiratory: Negative  Negative for shortness of breath  Cardiovascular: Negative  Negative for palpitations  Gastrointestinal: Negative  Negative for nausea and vomiting  Endocrine: Negative  Negative for cold intolerance and heat intolerance  Genitourinary: Negative  Negative for dysuria, frequency and urgency  Musculoskeletal: Positive for neck pain  Negative for myalgias  Skin: Negative  Negative for rash  Neurological: Negative  Negative for dizziness, tremors, seizures, syncope, facial asymmetry, speech difficulty, weakness, light-headedness, numbness and headaches  Hematological: Negative  Does not bruise/bleed easily  Psychiatric/Behavioral: Negative  Negative for confusion, hallucinations and sleep disturbance

## 2018-09-27 ENCOUNTER — EVALUATION (OUTPATIENT)
Dept: PHYSICAL THERAPY | Facility: REHABILITATION | Age: 73
End: 2018-09-27
Payer: MEDICARE

## 2018-09-27 DIAGNOSIS — M54.2 CERVICALGIA: Primary | ICD-10-CM

## 2018-09-27 PROCEDURE — G8981 BODY POS CURRENT STATUS: HCPCS | Performed by: PHYSICAL THERAPIST

## 2018-09-27 PROCEDURE — 97162 PT EVAL MOD COMPLEX 30 MIN: CPT | Performed by: PHYSICAL THERAPIST

## 2018-09-27 PROCEDURE — G8982 BODY POS GOAL STATUS: HCPCS | Performed by: PHYSICAL THERAPIST

## 2018-09-27 NOTE — PROGRESS NOTES
PT Evaluation     Today's date: 2018  Patient name: Rodrigo Saldivar  : 1945  MRN: [id number]  Referring provider: Mariela March MD  Dx:   Encounter Diagnosis     ICD-10-CM    1  Cervicalgia M54 2 Ambulatory referral to Physical Therapy       Start Time: 805  Stop Time: 845  Total time in clinic (min): 40 minutes    Assessment  Impairments: abnormal muscle tone, abnormal or restricted ROM, activity intolerance, impaired physical strength, lacks appropriate home exercise program, pain with function and poor posture   Functional limitations: reaching, lifting, driving, lateral rotation of the head  Assessment details: Rodrigo Saldivar  is a 75y o  year old male who presents to IE with:   Cervicalgia  (primary encounter diagnosis)    Holli Giraldo presents with the impairments as listed above and would benefit from Physical Therapy to address these impairments and to maximize function  Barriers to therapy: Repaired aortic dissection, coronary bypass, chronic pain, depression, GI disease, HTN  Understanding of Dx/Px/POC: good   Prognosis: good    Goals  Short-Term Goals:   1  Patient will increased ROM by 10 degrees in 4 weeks  2  Patient will report reduction in frequency and intensity of "shooting pain" into head in 4 weeks  Long-Term Goals:   1  Patient will demonstrate symmetrical cervical lateral rotation at time of discharge  2  Patient independent with HEP at time of discharge  3  Patient will be able to lift with minimal to no pain in cervical at time of discharge       Plan  Patient would benefit from: PT eval and skilled PT  Planned modality interventions: thermotherapy: hydrocollator packs and unattended electrical stimulation  Planned therapy interventions: therapeutic exercise, therapeutic activities, stretching, strengthening, postural training, patient education, home exercise program, IADL retraining, joint mobilization, manual therapy and neuromuscular re-education  Frequency: 2x week  Duration in visits: 10  Duration in weeks: 5  Plan of Care beginning date: 2018  Plan of Care expiration date: 2018  Treatment plan discussed with: patient  Plan details: Thank you for referring Anna Haq  to Physical Therapy at [place] [address] and for the opportunity to coordinate care  Subjective Evaluation    History of Present Illness  Mechanism of injury: Pearl Almaraz presents to  with cervicalgia  He reports the past 5-6 months he has noticed pain into the back of his head  He also reports at times pain into L ear and "top of my head " He reports pain referring into his head every day, noting frequency varies  He denies any jaw pain at this time  He reports he saw neurologist to address new pain  He reports he had CAT scan, MRI, and blood work performed which revealed "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  He denies N/T into bilateral UE at this time  He reports "the pain feels like an electric impulse, that only lasts a little, but it does hurt when I get it " He denies any referred pain into UE at this time  Patient denies dizziness, diplopia, dysphasia, dysphagia, and drop attacks at this time  He reports "sometimes I lose my balance when I'm walking, not that's severe, but I have to catch myself " Patient worked as a mailman for 35 years, noting he used to carry his bag on his L shoulder  He denies any migraines or headaches at this time     Quality of life: fair    Pain  Current pain ratin  At best pain ratin  At worst pain ratin  Location: cervical spine   Quality: burning (electric impulse)  Relieving factors: medications  Aggravating factors: lifting and overhead activity  Progression: no change    Social Support    Employment status: not working  Hand dominance: right    Treatments  Current treatment: physical therapy  Patient Goals  Patient goals for therapy: decreased pain, increased motion, increased strength and independence with ADLs/IADLs  Patient goal: "want to get rid of this sharp pain "         Objective     Special Questions  Negative for dizziness, faints, headaches, tinnitus, trouble swallowing and visual change    Postural Observations  Seated posture: poor  Standing posture: poor    Additional Postural Observation Details  Patient demonstrates following posture: Increased thoracic kyphosis   Cervical protrusion  Rounded shoulders, increased IR      Palpation   Left   Hypertonic in the suboccipitals and upper trapezius  Tenderness of the suboccipitals and upper trapezius  Trigger point to suboccipitals and upper trapezius  Neurological Testing     Sensation   Cervical/Thoracic   Left   Intact: light touch    Right   Intact: light touch    Reflexes   Left   Biceps (C5/C6): normal (2+)  Brachioradialis (C6): normal (2+)  Triceps (C7): normal (2+)    Right   Biceps (C5/C6): normal (2+)  Brachioradialis (C6): normal (2+)  Triceps (C7): normal (2+)    Additional Neurological Details  Patient denies N/T in R UE and L UE at this time  Sensation intact during IE         Joint Play Hypomobile: C3, C4, C5, C6, C7, T1 and T2 Pain: C3, C4, C5, C6, C7, T1 and T2     Strength/Myotome Testing   Cervical Spine     Left   Neck lateral flexion (C3): WFL    Right etr  Neck lateral flexion (C3): WFL    Left Shoulder     Planes of Motion   Flexion: 4   Extension: WFL   Abduction: 4 and WFL   Adduction: WFL   External rotation at 0°: 4   Internal rotation at 0°: 4     Right Shoulder     Planes of Motion   Flexion: 4   Extension: WFL   Abduction: 4 and WFL   Adduction: WFL   External rotation at 0°: 4   Internal rotation at 0°: 4     Left Elbow   Flexion: 4 and WFL  Extension: 4    Right Elbow   Flexion: 4 and WFL  Extension: 4    Left Wrist/Hand   Wrist extension: 4 and WFL  Wrist flexion: 4 and WFL  Radial deviation: WFL    Right Wrist/Hand   Wrist extension: 4 and WFL  Wrist flexion: 4 and WFL  Radial deviation: [facility]    Tests   Cervical Left   Negative alar ligament integrity, cervical distraction, Spurling's sign and cervical compression test       Right   Negative alar ligament integrity, cervical distraction, Spurling's sign and cervical compression test         Flowsheet Rows      Most Recent Value   PT/OT G-Codes   Current Score  57   Projected Score  67   Assessment Type  Evaluation   G code set  Changing & Maintaining Body Position   Changing and Maintaining Body Position Current Status ()  CK   Changing and Maintaining Body Position Goal Status ()  CJ        Precautions: Repaired aortic dissection, coronary bypass, chronic pain, depression, GI disease, HTN  Access code: [email or web address]  * Indicates part of HEP     Daily Treatment Diary     Manual  9/27            P/A mobilizations grade II in supine             IASTM L UT             L rotation mobs grade II-III in supine Done                                           Exercise Diary  9/27            Upper trapezius stretch* :05x5            Levator stretch             3 finger ROM             Scapular retraction* :05x10            Sidelying ER             TB rows             TB LPD             Chin tucks  supine 10                                                                                                                                                                            Modalities              MHP/ CP PRN

## 2018-10-02 ENCOUNTER — OFFICE VISIT (OUTPATIENT)
Dept: PHYSICAL THERAPY | Facility: REHABILITATION | Age: 73
End: 2018-10-02
Payer: MEDICARE

## 2018-10-02 DIAGNOSIS — M54.2 CERVICALGIA: Primary | ICD-10-CM

## 2018-10-02 PROCEDURE — 97112 NEUROMUSCULAR REEDUCATION: CPT

## 2018-10-02 PROCEDURE — 97110 THERAPEUTIC EXERCISES: CPT

## 2018-10-02 PROCEDURE — 97140 MANUAL THERAPY 1/> REGIONS: CPT

## 2018-10-02 NOTE — PROGRESS NOTES
Daily Note     Today's date: 10/2/2018  Patient name: Lindsay Chiang  : 1945  MRN: [id number]  Referring provider: Gaurang Soares MD  Dx:   Encounter Diagnosis     ICD-10-CM    1  Cervicalgia M54 2                   Subjective: Patient reports minimal neck discomfort prior to session today stating "just feels really sore " Patient reports compliance with HEP stating "not sure if I am doing them right "       Objective: See treatment diary below  Precautions: Repaired aortic dissection, coronary bypass, chronic pain, depression, GI disease, HTN  Access code: [email or web address]  * Indicates part of HEP     Daily Treatment Diary     Manual  9/27 10/2           P/A mobilizations grade II in supine             IASTM L UT  15' total STM            L rotation mobs grade II-III in supine Done  Done [facility] PT                                         Exercise Diary  9/27 10/2           Upper trapezius stretch* :05x5 :05x10           Levator stretch  :05x10           3 finger ROM             Scapular retraction* :05x10 :05x20           Sidelying ER             TB rows  PTB 2x10           TB LPD  PTB 2x10           Chin tucks  supine 10 supine 20                                                                                                                                                                           Modalities              MHP/ CP PRN                                             Assessment: Tolerated treatment fair  Patient demonstrated fatigue post treatment and would benefit from continued PT Initiated POC this session where patient had fair tolerance, cues required for gentle stretching  UT compensation noted with most TE, patient able to correct somewhat once tactile cues given  Plan: Continue per plan of care  Progress treatment as tolerated

## 2018-10-04 ENCOUNTER — OFFICE VISIT (OUTPATIENT)
Dept: PHYSICAL THERAPY | Facility: REHABILITATION | Age: 73
End: 2018-10-04
Payer: MEDICARE

## 2018-10-04 DIAGNOSIS — M54.2 CERVICALGIA: Primary | ICD-10-CM

## 2018-10-04 PROCEDURE — 97112 NEUROMUSCULAR REEDUCATION: CPT

## 2018-10-04 PROCEDURE — 97140 MANUAL THERAPY 1/> REGIONS: CPT

## 2018-10-04 PROCEDURE — 97110 THERAPEUTIC EXERCISES: CPT

## 2018-10-04 NOTE — PROGRESS NOTES
Daily Note     Today's date: 10/4/2018  Patient name: Jessica Quintanilla  : 1945  MRN: [id number]  Referring provider: Rao Waldron MD  Dx:   Encounter Diagnosis     ICD-10-CM    1  Cervicalgia M54 2                   Subjective: Patient denies any neck pain prior to session today, minimal soreness stating "neck does feel a little better, definitely looser " He reports stubbing his toe two nights ago stating "i think I broke it " Patient denies seeing MD in regards to injury  Objective: See treatment diary below  Precautions: Repaired aortic dissection, coronary bypass, chronic pain, depression, GI disease, HTN  Access code: [email or web address]  * Indicates part of HEP     Daily Treatment Diary     Manual  9/27 10/2 10/4          P/A mobilizations grade II in supine             IASTM L UT  15' total STM  10' total STM          L rotation mobs grade II-III in supine Done  Done [facility] PT                                         Exercise Diary  9/27 10/2 10/4          Upper trapezius stretch* :05x5 :05x10 :05x10          Levator stretch  :05x10 :05x10          3 finger ROM             Scapular retraction* :05x10 :05x20 :05x20          Sidelying ER   2x10          TB rows  PTB 2x10 OTB 2x10          TB LPD  PTB 2x10 OTB 2x10          Chin tucks  supine 10 supine 20 supine 20                                                                                                                                                                           Modalities              MHP/ CP PRN                                             Assessment: Tolerated treatment well  Patient demonstrated fatigue post treatment, exhibited good technique with therapeutic exercises and would benefit from continued PT Trialed sidelying ER where patient tolerated well, fatigue noted  Improvements noted with stretching, less UT compensation noted, over occasional tactile cues required  Plan: Continue per plan of care    Progress treatment as tolerated

## 2018-10-09 ENCOUNTER — OFFICE VISIT (OUTPATIENT)
Dept: PHYSICAL THERAPY | Facility: REHABILITATION | Age: 73
End: 2018-10-09
Payer: MEDICARE

## 2018-10-09 DIAGNOSIS — M54.2 CERVICALGIA: Primary | ICD-10-CM

## 2018-10-09 PROCEDURE — 97140 MANUAL THERAPY 1/> REGIONS: CPT | Performed by: PHYSICAL THERAPIST

## 2018-10-09 PROCEDURE — 97110 THERAPEUTIC EXERCISES: CPT | Performed by: PHYSICAL THERAPIST

## 2018-10-09 NOTE — PROGRESS NOTES
Daily Note     Today's date: 10/9/2018  Patient name: Jessica Quintanilla  : 1945  MRN: [id number]  Referring provider: Rao Waldron MD  Dx:   Encounter Diagnosis     ICD-10-CM    1  Cervicalgia M54 2        Start Time: 0800  Stop Time: 0845  Total time in clinic (min): 45 minutes    Subjective: Alyssa Morrell reports minimal neck pain upon arrival to therapy today  He reports headache previous day, denying any specific CONSUELO for headache or any precipitating neck pain with headache  Objective: See treatment diary below  Precautions: Repaired aortic dissection, coronary bypass, chronic pain, depression, GI disease, HTN  Access code: Raina Wade  * Indicates part of HEP     Daily Treatment Diary     Manual  9/27 10/2 10/4 10/9         P/A mobilizations grade II in supine             IASTM L UT  15' total STM  10' total STM 15' total          L rotation mobs grade II-III in supine Done  Done [facility] PT  Done                                        Exercise Diary  9/27 10/2 10/4 10/9         Upper trapezius stretch* :05x5 :05x10 :05x10 :05x 10          Levator stretch  :05x10 :05x10 :05x 10          3 finger ROM             Scapular retraction* :05x10 :05x20 :05x20 :05x20         Sidelying ER   2x10 2# 2x10         TB rows  PTB 2x10 OTB 2x10 OTB 2x10         TB LPD  PTB 2x10 OTB 2x10 OTB 2x10         Chin tucks  supine 10 supine 20 supine 20  supine 20                                                                                                                                                                         Modalities              MHP/ CP PRN                                           Assessment: Tolerated treatment fair  Patient demonstrated fatigue post treatment, exhibited good technique with therapeutic exercises and would benefit from continued PT   Patient demonstrating improvements in lateral rotation with manuals performed today   Patient progressed in resistance with sidelying ER with fair tolerance, no increase in neck pain  Plan: Continue per plan of care  Progress treatment as tolerated

## 2018-10-11 ENCOUNTER — APPOINTMENT (OUTPATIENT)
Dept: PHYSICAL THERAPY | Facility: REHABILITATION | Age: 73
End: 2018-10-11
Payer: MEDICARE

## 2018-10-16 ENCOUNTER — OFFICE VISIT (OUTPATIENT)
Dept: PHYSICAL THERAPY | Facility: REHABILITATION | Age: 73
End: 2018-10-16
Payer: MEDICARE

## 2018-10-16 DIAGNOSIS — M54.2 CERVICALGIA: Primary | ICD-10-CM

## 2018-10-16 PROCEDURE — 97140 MANUAL THERAPY 1/> REGIONS: CPT | Performed by: PHYSICAL THERAPIST

## 2018-10-16 PROCEDURE — 97112 NEUROMUSCULAR REEDUCATION: CPT | Performed by: PHYSICAL THERAPIST

## 2018-10-16 PROCEDURE — 97110 THERAPEUTIC EXERCISES: CPT | Performed by: PHYSICAL THERAPIST

## 2018-10-16 RX ORDER — ALPRAZOLAM 0.25 MG/1
TABLET ORAL
Qty: 90 TABLET | Refills: 1 | Status: CANCELLED | OUTPATIENT
Start: 2018-10-16

## 2018-10-16 NOTE — PROGRESS NOTES
Daily Note     Today's date: 10/16/2018  Patient name: Lindsay Chiang  : 1945  MRN: [id number]  Referring provider: Gaurang Soares MD  Dx:   Encounter Diagnosis     ICD-10-CM    1  Cervicalgia M54 2        Start Time: 0800  Stop Time:   Total time in clinic (min): 50 minutes    Subjective: Hope Villanueva reports no cervical spine pain upon arrival to therapy today  He reports less intensity, duration, and frequency of "sharp pain" in cervical spine since beginning PT  Objective: See treatment diary below  Precautions: Repaired aortic dissection, coronary bypass, chronic pain, depression, GI disease, HTN  Access code: Román Puri  * Indicates part of HEP     Daily Treatment Diary     Manual  9/27 10/2 10/4 10/9 10/16        P/A mobilizations grade II in supine             IASTM L UT  15' total STM  10' total STM 15' total  15' total        L rotation mobs grade II-III in supine Done  Done RH PT  Done  Done                                       Exercise Diary  9/27 10/2 10/4 10/9 10/16        Upper trapezius stretch* :05x5 :05x10 :05x10 :05x 10  :10x 10        Levator stretch  :05x10 :05x10 :05x 10  :10x 10        3 finger ROM             Scapular retraction* :05x10 :05x20 :05x20 :05x20 With ER OTB 2x10        Sidelying ER   2x10 2# 2x10 2# 3x10        TB rows  PTB 2x10 OTB 2x10 OTB 2x10 GTB 3x10        TB LPD  PTB 2x10 OTB 2x10 OTB 2x10 GTB 3x10        Chin tucks  supine 10 supine 20 supine 20  supine 20 supine 20        Sidelying flexion     2x10                                                                                                                                                           Modalities              MHP/ CP PRN                                           Assessment: Tolerated treatment fair   Patient demonstrated fatigue post treatment, exhibited good technique with therapeutic exercises and would benefit from continued PT  Added scapular retraction with ER and sidelying flexion today with fair tolerance  Patient demonstrating improvements with ROM with manuals performed today, improved joint segmental mobility palpated by PT  Plan: Continue per plan of care  Progress treatment as tolerated

## 2018-10-18 ENCOUNTER — OFFICE VISIT (OUTPATIENT)
Dept: PHYSICAL THERAPY | Facility: REHABILITATION | Age: 73
End: 2018-10-18
Payer: MEDICARE

## 2018-10-18 DIAGNOSIS — M54.2 CERVICALGIA: Primary | ICD-10-CM

## 2018-10-18 PROCEDURE — 97112 NEUROMUSCULAR REEDUCATION: CPT

## 2018-10-18 PROCEDURE — 97110 THERAPEUTIC EXERCISES: CPT

## 2018-10-18 PROCEDURE — 97140 MANUAL THERAPY 1/> REGIONS: CPT

## 2018-10-18 NOTE — PROGRESS NOTES
Daily Note     Today's date: 10/18/2018  Patient name: Bryant Lake  : 1945  MRN: [id number]  Referring provider: Orlando Ricci MD  Dx:   Encounter Diagnosis     ICD-10-CM    1  Cervicalgia M54 2                   Subjective: Patient reports slight soreness "in same spot" prior to session today stating "it's an ache, not constant, but same spot all the time " He denies any complaints after previous session  Patient requested to leave session early secondary to having another appointment  Objective: See treatment diary below  Precautions: Repaired aortic dissection, coronary bypass, chronic pain, depression, GI disease, HTN  Access code: Vernestine Mole  * Indicates part of HEP     Daily Treatment Diary     Manual  9/27 10/2 10/4 10/9 10/16 10/18       P/A mobilizations grade II in supine             IASTM L UT  15' total STM  10' total STM 15' total  15' total 15' total       L rotation mobs grade II-III in supine Done  Done RH PT  Done  Done  Done [facility]                                     Exercise Diary  9/27 10/2 10/4 10/9 10/16 10/18       Upper trapezius stretch* :05x5 :05x10 :05x10 :05x 10  :10x 10 :10x10       Levator stretch  :05x10 :05x10 :05x 10  :10x 10 :10x10       3 finger ROM             Scapular retraction* :05x10 :05x20 :05x20 :05x20 With ER OTB 2x10 With ER OTB 2x10       Sidelying ER   2x10 2# 2x10 2# 3x10 2# 3x10       TB rows  PTB 2x10 OTB 2x10 OTB 2x10 GTB 3x10 GTB 3x10       TB LPD  PTB 2x10 OTB 2x10 OTB 2x10 GTB 3x10 GTB 3x10       Chin tucks  supine 10 supine 20 supine 20  supine 20 supine 20 supine 20       Sidelying flexion     2x10 3x10                                                                                                                                                          Modalities              MHP/ CP PRN                                           Assessment: Tolerated treatment fair   Patient demonstrated fatigue post treatment and would benefit from continued PT Cues required for proper stretching required to avoid UT compensation  Patient denies increased pain with any TE performed, with improvements in ROM noted after manuals  Plan: Continue per plan of care  Progress treatment as tolerated

## 2018-10-19 ENCOUNTER — HOSPITAL ENCOUNTER (OUTPATIENT)
Dept: NON INVASIVE DIAGNOSTICS | Facility: CLINIC | Age: 73
Discharge: HOME | End: 2018-10-19
Payer: MEDICARE

## 2018-10-19 DIAGNOSIS — I65.23 BILATERAL CAROTID ARTERY STENOSIS: ICD-10-CM

## 2018-10-19 PROCEDURE — 93880 EXTRACRANIAL BILAT STUDY: CPT | Performed by: SURGERY

## 2018-10-19 PROCEDURE — 93880 EXTRACRANIAL BILAT STUDY: CPT

## 2018-10-21 RX ORDER — ALPRAZOLAM 0.25 MG/1
TABLET ORAL
Qty: 90 TABLET | Refills: 1 | Status: CANCELLED | OUTPATIENT
Start: 2018-10-21

## 2018-10-22 ENCOUNTER — TELEPHONE (OUTPATIENT)
Dept: PSYCHIATRY | Facility: CLINIC | Age: 73
End: 2018-10-22

## 2018-10-22 DIAGNOSIS — F41.1 GAD (GENERALIZED ANXIETY DISORDER): Primary | ICD-10-CM

## 2018-10-22 RX ORDER — ALPRAZOLAM 0.25 MG/1
0.25 TABLET ORAL
Qty: 30 TABLET | Refills: 3 | Status: SHIPPED | OUTPATIENT
Start: 2018-10-22 | End: 2019-01-03 | Stop reason: SDUPTHER

## 2018-10-23 ENCOUNTER — OFFICE VISIT (OUTPATIENT)
Dept: PHYSICAL THERAPY | Facility: REHABILITATION | Age: 73
End: 2018-10-23
Payer: MEDICARE

## 2018-10-23 DIAGNOSIS — M54.2 CERVICALGIA: Primary | ICD-10-CM

## 2018-10-23 PROCEDURE — 97140 MANUAL THERAPY 1/> REGIONS: CPT | Performed by: PHYSICAL THERAPIST

## 2018-10-23 PROCEDURE — 97110 THERAPEUTIC EXERCISES: CPT | Performed by: PHYSICAL THERAPIST

## 2018-10-23 PROCEDURE — 97112 NEUROMUSCULAR REEDUCATION: CPT | Performed by: PHYSICAL THERAPIST

## 2018-10-23 NOTE — PROGRESS NOTES
Daily Note     Today's date: 10/23/2018  Patient name: Jody Bryant  : 1945  MRN: [id number]  Referring provider: Remi Pickering MD  Dx:   Encounter Diagnosis     ICD-10-CM    1  Cervicalgia M54 2        Start Time: 0800  Stop Time: 5393  Total time in clinic (min): 55 minutes    Subjective: Gerardo Melissa reports "neck feels better, still get the pain in the head sometimes though" upon arrival to therapy today  He denies any increased soreness following previous therapy session         Objective: See treatment diary below  Precautions: Repaired aortic dissection, coronary bypass, chronic pain, depression, GI disease, HTN  Access code: Jacinda Robertson  * Indicates part of HEP     Daily Treatment Diary     Manual  9/27 10/2 10/4 10/9 10/16 10/18 10/23      P/A mobilizations grade II in supine             IASTM L UT  15' total STM  10' total STM 15' total  15' total 15' total 15' total      L rotation mobs grade II-III in supine Done  Done RH PT  Done  Done  Done [facility] Done                                     Exercise Diary  9/27 10/2 10/4 10/9 10/16 10/18 10/23      Upper trapezius stretch* :05x5 :05x10 :05x10 :05x 10  :10x 10 :10x10 :10x10      Levator stretch  :05x10 :05x10 :05x 10  :10x 10 :10x10 :10x10      3 finger ROM             Scapular retraction* :05x10 :05x20 :05x20 :05x20 With ER OTB 2x10 With ER OTB 2x10 With ER OTB 2x10      Sidelying ER   2x10 2# 2x10 2# 3x10 2# 3x10 2# 3x12       TB rows  PTB 2x10 OTB 2x10 OTB 2x10 GTB 3x10 GTB 3x10 GTB 3x10      TB LPD  PTB 2x10 OTB 2x10 OTB 2x10 GTB 3x10 GTB 3x10 GTB 3x10      Chin tucks  supine 10 supine 20 supine 20  supine 20 supine 20 supine 20 supine 2x10      Sidelying flexion     2x10 3x10 2# 2x10      Deep neck flexor       :03x10                                                                                                                                            Modalities              MHP/ CP PRN                                           Assessment: Tolerated treatment fair  Patient demonstrated fatigue post treatment, exhibited good technique with therapeutic exercises and would benefit from continued PT  Patient demonstrating improvements with neck ROM with all mobilizations performed today, improvements in rotation observed  Added DNF strengthening today with fair tolerance, patient reporting minimal neck soreness  Continue to progress patient strengthening as able next visit  Plan: Continue per plan of care  Progress treatment as tolerated

## 2018-10-24 ENCOUNTER — HOSPITAL ENCOUNTER (EMERGENCY)
Facility: HOSPITAL | Age: 73
Discharge: HOME | End: 2018-10-24
Attending: EMERGENCY MEDICINE | Admitting: EMERGENCY MEDICINE
Payer: MEDICARE

## 2018-10-24 ENCOUNTER — APPOINTMENT (EMERGENCY)
Dept: CT IMAGING | Facility: HOSPITAL | Age: 73
End: 2018-10-24
Payer: MEDICARE

## 2018-10-24 VITALS
RESPIRATION RATE: 20 BRPM | HEART RATE: 68 BPM | DIASTOLIC BLOOD PRESSURE: 81 MMHG | BODY MASS INDEX: 33.77 KG/M2 | SYSTOLIC BLOOD PRESSURE: 171 MMHG | WEIGHT: 241.18 LBS | TEMPERATURE: 98 F | HEIGHT: 71 IN | OXYGEN SATURATION: 94 %

## 2018-10-24 DIAGNOSIS — G43.909 MIGRAINE HEADACHE: Primary | ICD-10-CM

## 2018-10-24 LAB
ANION GAP SERPL CALCULATED.3IONS-SCNC: 7 MMOL/L (ref 4–13)
BASOPHILS # BLD AUTO: 0.04 THOUSANDS/ΜL (ref 0–0.1)
BASOPHILS NFR BLD AUTO: 0 % (ref 0–1)
BUN SERPL-MCNC: 19 MG/DL (ref 5–25)
CALCIUM SERPL-MCNC: 9.1 MG/DL (ref 8.3–10.1)
CHLORIDE SERPL-SCNC: 104 MMOL/L (ref 100–108)
CO2 SERPL-SCNC: 27 MMOL/L (ref 21–32)
CREAT SERPL-MCNC: 1.2 MG/DL (ref 0.6–1.3)
EOSINOPHIL # BLD AUTO: 0.14 THOUSAND/ΜL (ref 0–0.61)
EOSINOPHIL NFR BLD AUTO: 2 % (ref 0–6)
ERYTHROCYTE [DISTWIDTH] IN BLOOD BY AUTOMATED COUNT: 12.9 % (ref 11.6–15.1)
GFR SERPL CREATININE-BSD FRML MDRD: 59 ML/MIN/1.73SQ M
GLUCOSE SERPL-MCNC: 110 MG/DL (ref 65–140)
HCT VFR BLD AUTO: 39.2 % (ref 36.5–49.3)
HGB BLD-MCNC: 13.7 G/DL (ref 12–17)
IMM GRANULOCYTES # BLD AUTO: 0.03 THOUSAND/UL (ref 0–0.2)
IMM GRANULOCYTES NFR BLD AUTO: 0 % (ref 0–2)
LYMPHOCYTES # BLD AUTO: 1 THOUSANDS/ΜL (ref 0.6–4.47)
LYMPHOCYTES NFR BLD AUTO: 11 % (ref 14–44)
MCH RBC QN AUTO: 32.2 PG (ref 26.8–34.3)
MCHC RBC AUTO-ENTMCNC: 34.9 G/DL (ref 31.4–37.4)
MCV RBC AUTO: 92 FL (ref 82–98)
MONOCYTES # BLD AUTO: 0.48 THOUSAND/ΜL (ref 0.17–1.22)
MONOCYTES NFR BLD AUTO: 5 % (ref 4–12)
NEUTROPHILS # BLD AUTO: 7.54 THOUSANDS/ΜL (ref 1.85–7.62)
NEUTS SEG NFR BLD AUTO: 82 % (ref 43–75)
NRBC BLD AUTO-RTO: 0 /100 WBCS
PLATELET # BLD AUTO: 207 THOUSANDS/UL (ref 149–390)
PMV BLD AUTO: 8.5 FL (ref 8.9–12.7)
POTASSIUM SERPL-SCNC: 4 MMOL/L (ref 3.5–5.3)
RBC # BLD AUTO: 4.26 MILLION/UL (ref 3.88–5.62)
SODIUM SERPL-SCNC: 138 MMOL/L (ref 136–145)
WBC # BLD AUTO: 9.23 THOUSAND/UL (ref 4.31–10.16)

## 2018-10-24 PROCEDURE — 36415 COLL VENOUS BLD VENIPUNCTURE: CPT | Performed by: EMERGENCY MEDICINE

## 2018-10-24 PROCEDURE — 96375 TX/PRO/DX INJ NEW DRUG ADDON: CPT

## 2018-10-24 PROCEDURE — 70450 CT HEAD/BRAIN W/O DYE: CPT

## 2018-10-24 PROCEDURE — 96365 THER/PROPH/DIAG IV INF INIT: CPT

## 2018-10-24 PROCEDURE — 85025 COMPLETE CBC W/AUTO DIFF WBC: CPT | Performed by: EMERGENCY MEDICINE

## 2018-10-24 PROCEDURE — 80048 BASIC METABOLIC PNL TOTAL CA: CPT | Performed by: EMERGENCY MEDICINE

## 2018-10-24 PROCEDURE — 99284 EMERGENCY DEPT VISIT MOD MDM: CPT

## 2018-10-24 RX ORDER — BUTALBITAL, ACETAMINOPHEN AND CAFFEINE 50; 325; 40 MG/1; MG/1; MG/1
1 TABLET ORAL EVERY 6 HOURS PRN
Qty: 15 TABLET | Refills: 0 | Status: SHIPPED | OUTPATIENT
Start: 2018-10-24 | End: 2019-03-24

## 2018-10-24 RX ORDER — MAGNESIUM SULFATE HEPTAHYDRATE 40 MG/ML
2 INJECTION, SOLUTION INTRAVENOUS ONCE
Status: COMPLETED | OUTPATIENT
Start: 2018-10-24 | End: 2018-10-24

## 2018-10-24 RX ORDER — METOCLOPRAMIDE HYDROCHLORIDE 5 MG/ML
10 INJECTION INTRAMUSCULAR; INTRAVENOUS ONCE
Status: COMPLETED | OUTPATIENT
Start: 2018-10-24 | End: 2018-10-24

## 2018-10-24 RX ORDER — KETOROLAC TROMETHAMINE 30 MG/ML
15 INJECTION, SOLUTION INTRAMUSCULAR; INTRAVENOUS ONCE
Status: COMPLETED | OUTPATIENT
Start: 2018-10-24 | End: 2018-10-24

## 2018-10-24 RX ORDER — DIPHENHYDRAMINE HYDROCHLORIDE 50 MG/ML
12.5 INJECTION INTRAMUSCULAR; INTRAVENOUS ONCE
Status: COMPLETED | OUTPATIENT
Start: 2018-10-24 | End: 2018-10-24

## 2018-10-24 RX ADMIN — METOCLOPRAMIDE 10 MG: 5 INJECTION, SOLUTION INTRAMUSCULAR; INTRAVENOUS at 16:49

## 2018-10-24 RX ADMIN — KETOROLAC TROMETHAMINE 15 MG: 30 INJECTION, SOLUTION INTRAMUSCULAR at 16:49

## 2018-10-24 RX ADMIN — MAGNESIUM SULFATE HEPTAHYDRATE 2 G: 40 INJECTION, SOLUTION INTRAVENOUS at 16:49

## 2018-10-24 RX ADMIN — SODIUM CHLORIDE 1000 ML: 0.9 INJECTION, SOLUTION INTRAVENOUS at 16:53

## 2018-10-24 RX ADMIN — DIPHENHYDRAMINE HYDROCHLORIDE 12.5 MG: 50 INJECTION, SOLUTION INTRAMUSCULAR; INTRAVENOUS at 16:49

## 2018-10-24 NOTE — ED NOTES
Discharge instruction given to patient  Medication regimen and prescription reviewed  No questions or concerns at home  Patient able to ambulate out without assistance        Sari Corley RN  10/24/18 4780

## 2018-10-24 NOTE — ED PROVIDER NOTES
History  Chief Complaint   Patient presents with    Earache     Pt has been seen for neck pain and has been getting rehab for the past three weeks  Starting a week and half ago the ear and eye on left side pain have been getting worse  Today the pain has not been relieved with medication  Pt started to have nause during transportation   Eye Pain    Neck Pain       History provided by:  Patient and medical records   used: No     67-year-old male presented for evaluation of acutely worsening headache today on the left side associated with pain behind his left eye and in the left ear  He has had some similar symptoms over the past week but not this severe  States he tried to lie down, take Tylenol but it did not help  He reported some blurred vision in the left eye but this is chronic  He also reported having some nausea but that resolved today  No known history of aneurysm  He has been having some neuropathic type pain on the left side of his head for a few months now and was seen by Neurology  He is also having left-sided neck pain, had an MRI of the cervical spine which was notable for degenerative changes only  He is currently in physical therapy and per notes has had some improvement  Patient reports he had carotid duplex 2 days ago which was unremarkable  He has a nonfocal neurologic exam   He is resting with his eyes closed but is alert and fully follows commands  Plan CT head to rule out any acute process like subarachnoid hemorrhage as well as migraine cocktail and will re-evaluate  Differential diagnosis also includes neuropathic pain, migraine, tension headache  Prior to Admission Medications   Prescriptions Last Dose Informant Patient Reported? Taking?    ALPRAZolam (XANAX) 0 25 mg tablet   No Yes   Sig: Take 1 tablet (0 25 mg total) by mouth daily at bedtime as needed for anxiety   BUTALBITAL-ACETAMINOPHEN PO  Self Yes Yes   Sig: Take by mouth   Cholecalciferol (VITAMIN D3) 29219 units CAPS  Self Yes Yes   Sig: Take by mouth   DULoxetine (CYMBALTA) 60 mg delayed release capsule  Self Yes Yes   acetaminophen (TYLENOL) 80 mg chewable tablet   Yes Yes   Sig: Chew 80 mg every 4 (four) hours as needed for mild pain   aspirin 81 MG tablet  Self Yes Yes   Sig: Take 1 tablet by mouth daily   atorvastatin (LIPITOR) 40 mg tablet  Self Yes Yes   Sig: Take 40 mg by mouth daily   labetalol (NORMODYNE) 100 mg tablet  Self Yes Yes   Sig: Take 0 5 tablets by mouth 2 (two) times a day   omeprazole (PriLOSEC) 40 MG capsule  Self Yes Yes      Facility-Administered Medications: None       Past Medical History:   Diagnosis Date    Anxiety     Depression     Hyperlipidemia     Hypertension     Kidney stone     Renal cyst     Renal cyst     Sleep apnea        Past Surgical History:   Procedure Laterality Date    ABDOMINAL AORTIC ANEURYSM REPAIR      CELIAC ARTERY STENT      KIDNEY STONE SURGERY         Family History   Problem Relation Age of Onset    Stroke Mother     Prostate cancer Father      I have reviewed and agree with the history as documented  Social History   Substance Use Topics    Smoking status: Former Smoker     Packs/day: 1 00     Years: 24 00     Types: Cigarettes     Start date: 4/26/1959     Quit date: 4/26/1983    Smokeless tobacco: Never Used    Alcohol use No        Review of Systems   Constitutional: Negative for activity change, appetite change, fatigue and fever  Eyes: Negative for photophobia and visual disturbance  Respiratory: Negative for chest tightness and shortness of breath  Gastrointestinal: Positive for nausea  Neurological: Positive for headaches  Negative for dizziness and weakness  All other systems reviewed and are negative  Physical Exam  Physical Exam   Constitutional: He is oriented to person, place, and time  He appears well-developed and well-nourished  No distress  HENT:   Head: Normocephalic and atraumatic  Eyes: Pupils are equal, round, and reactive to light  EOM are normal    Neck: Normal range of motion  Neck supple  Cardiovascular: Normal rate and regular rhythm  Pulmonary/Chest: Effort normal  No respiratory distress  Abdominal: Soft  He exhibits no distension  Musculoskeletal: Normal range of motion  He exhibits no edema  Neurological: He is alert and oriented to person, place, and time  No cranial nerve deficit  Skin: Skin is warm and dry  Psychiatric: He has a normal mood and affect  His behavior is normal    Nursing note and vitals reviewed        Vital Signs  ED Triage Vitals [10/24/18 1523]   Temperature Pulse Respirations Blood Pressure SpO2   98 °F (36 7 °C) 55 20 (!) 174/79 95 %      Temp src Heart Rate Source Patient Position - Orthostatic VS BP Location FiO2 (%)   -- Monitor Sitting Right arm --      Pain Score       5           Vitals:    10/24/18 1523 10/24/18 1700   BP: (!) 174/79 (!) 171/81   Pulse: 55 68   Patient Position - Orthostatic VS: Sitting Sitting       Visual Acuity      ED Medications  Medications   sodium chloride 0 9 % bolus 1,000 mL (0 mL Intravenous Stopped 10/24/18 1801)   diphenhydrAMINE (BENADRYL) injection 12 5 mg (12 5 mg Intravenous Given 10/24/18 1649)   metoclopramide (REGLAN) injection 10 mg (10 mg Intravenous Given 10/24/18 1649)   magnesium sulfate 2 g/50 mL IVPB (premix) 2 g (0 g Intravenous Stopped 10/24/18 1801)   ketorolac (TORADOL) injection 15 mg (15 mg Intravenous Given 10/24/18 1649)       Diagnostic Studies  Results Reviewed     Procedure Component Value Units Date/Time    Basic metabolic panel [[id number]] Collected:  10/24/18 1649    Lab Status:  Final result Specimen:  Blood from Arm, Right Updated:  10/24/18 1710     Sodium 138 mmol/L      Potassium 4 0 mmol/L      Chloride 104 mmol/L      CO2 27 mmol/L      ANION GAP 7 mmol/L      BUN 19 mg/dL      Creatinine 1 20 mg/dL      Glucose 110 mg/dL      Calcium 9 1 mg/dL      eGFR 59 ml/min/1 73sq m Narrative:         National Kidney Disease Education Program recommendations are as follows:  GFR calculation is accurate only with a steady state creatinine  Chronic Kidney disease less than 60 ml/min/1 73 sq  meters  Kidney failure less than 15 ml/min/1 73 sq  meters  CBC and differential [[id number]]  (Abnormal) Collected:  10/24/18 1649    Lab Status:  Final result Specimen:  Blood from Arm, Right Updated:  10/24/18 1655     WBC 9 23 Thousand/uL      RBC 4 26 Million/uL      Hemoglobin 13 7 g/dL      Hematocrit 39 2 %      MCV 92 fL      MCH 32 2 pg      MCHC 34 9 g/dL      RDW 12 9 %      MPV 8 5 (L) fL      Platelets 061 Thousands/uL      nRBC 0 /100 WBCs      Neutrophils Relative 82 (H) %      Immat GRANS % 0 %      Lymphocytes Relative 11 (L) %      Monocytes Relative 5 %      Eosinophils Relative 2 %      Basophils Relative 0 %      Neutrophils Absolute 7 54 Thousands/µL      Immature Grans Absolute 0 03 Thousand/uL      Lymphocytes Absolute 1 00 Thousands/µL      Monocytes Absolute 0 48 Thousand/µL      Eosinophils Absolute 0 14 Thousand/µL      Basophils Absolute 0 04 Thousands/µL                  CT head without contrast   Final Result by Carli Blackman MD (10/24 1642)      No acute intracranial abnormality or significant change from prior  Workstation performed: [id number]                    Procedures  Procedures       Phone Contacts  ED Phone Contact    ED Course  ED Course as of Oct 25 0106   Wed Oct 24, 2018   1748 CT head negative  Patient's symptoms improved after migraine cocktail  Stable for discharge home  MDM  Number of Diagnoses or Management Options  Migraine headache:   Diagnosis management comments: 59-year-old male undergoing treatment for a left-sided neuropathic neck and head pain presented with worsening headache behind his left eye and in his left ear over the last few days         Amount and/or Complexity of Data Reviewed  Clinical lab tests: ordered and reviewed  Tests in the radiology section of CPT®: ordered and reviewed  Obtain history from someone other than the patient: yes    Patient Progress  Patient progress: improved    CritCare Time    Disposition  Final diagnoses:   Migraine headache     Time reflects when diagnosis was documented in both MDM as applicable and the Disposition within this note     Time User Action Codes Description Comment    10/24/2018  5:49 PM Imelda Khalil Add [U60 519] Migraine headache       ED Disposition     ED Disposition Condition Comment    Discharge  Mary Dumont  discharge to home/self care  Condition at discharge: Good        Follow-up Information     Follow up With Specialties Details Why Contact Info Additional Information    Nick Short MD Family Medicine   [address]  [place] [facility] [place] [address]  Emergency Department Emergency Medicine  If symptoms worsen [address] [address]  [phone]  ED,  [address] [place] [address], [phone]          Discharge Medication List as of 10/24/2018  5:53 PM      START taking these medications    Details   butalbital-acetaminophen-caffeine (FIORICET,ESGIC) -40 mg per tablet Take 1 tablet by mouth every 6 (six) hours as needed for headaches, Starting Wed 10/24/2018, Print         CONTINUE these medications which have NOT CHANGED    Details   acetaminophen (TYLENOL) 80 mg chewable tablet Chew 80 mg every 4 (four) hours as needed for mild pain, Historical Med      ALPRAZolam (XANAX) 0 25 mg tablet Take 1 tablet (0 25 mg total) by mouth daily at bedtime as needed for anxiety, Starting Mon 10/22/2018, Normal      aspirin 81 MG tablet Take 1 tablet by mouth daily, Starting Mon 3/30/2015, Historical Med      atorvastatin (LIPITOR) 40 mg tablet Take 40 mg by mouth daily, Starting Mon 4/9/2018, Historical Med BUTALBITAL-ACETAMINOPHEN PO Take by mouth, Historical Med      Cholecalciferol (VITAMIN D3) 94972 units CAPS Take by mouth, Starting Mon 4/17/2017, Historical Med      DULoxetine (CYMBALTA) 60 mg delayed release capsule Starting Tue 3/13/2018, Historical Med      labetalol (NORMODYNE) 100 mg tablet Take 0 5 tablets by mouth 2 (two) times a day, Starting Mon 3/30/2015, Historical Med      omeprazole (PriLOSEC) 40 MG capsule Starting Mon 4/30/2018, Historical Med           No discharge procedures on file      ED Provider  Electronically Signed by           Renata Jackson MD  10/25/18 7690

## 2018-10-24 NOTE — DISCHARGE INSTRUCTIONS
Migraine Headache   WHAT YOU SHOULD KNOW:   A migraine is a severe headache  The pain can be so severe that it interferes with your daily activities  A migraine can last a few hours up to several days  The exact cause of migraines is not known  It may be caused by changes in your body chemicals and extra sensitive nerves in your brain  AFTER YOU LEAVE:   Medicines:  Take medicine as soon as you feel a migraine begin  · Pain medicine: You may need medicine to take away or decrease pain  You may need a doctor's order for this medicine  Do not wait until the pain is severe before you take your medicine  · Migraine medicines: These are used to help prevent a migraine or stop it once it starts  · Antinausea medicine: This medicine may be given to calm your stomach and to help prevent vomiting  They can also help relieve pain  · Take your medicine as directed  Call your healthcare provider if you think your medicine is not helping or if you have side effects  Tell him if you are allergic to any medicine  Keep a list of the medicines, vitamins, and herbs you take  Include the amounts, and when and why you take them  Bring the list or the pill bottles to follow-up visits  Carry your medicine list with you in case of an emergency  Manage your symptoms:   · Rest:  Rest in a dark, quiet room  This will help decrease your pain  · Ice:  Ice helps decrease pain  Use an ice pack or put crushed ice in a plastic bag  Cover the ice pack with a towel and place it on your head where it hurts for 15 to 20 minutes every hour  · Heat:  Heat helps decrease pain and muscle spasms  Use a small towel dampened with warm water or a heating pad, or sit in a warm bath  Apply heat on the area for 20 to 30 minutes every 2 hours  You may alternate heat and ice  Keep a headache diary:  Write down when your migraines start and stop  Include your symptoms and what you were doing when a migraine began   Record what you ate or drank for 24 hours before the migraine started  Describe the pain and where it hurts  Keep track of what you did to treat your migraine and whether it worked  Follow up with your primary healthcare provider or neurologist as directed:  Bring your headache diary with you when you see your primary healthcare provider  Write down your questions so you remember to ask them during your visits  Prevent another migraine:   · Do not smoke: If you smoke, it is never too late to quit  Tobacco smoke can trigger a migraine  It can also cause heart disease, lung disease, cancer, and other health problems  Quitting smoking will improve your health and the health of those around you  If you smoke, ask for information about how to stop  · Do not drink alcohol:  Alcohol can trigger a migraine  It can also interfere with the medicines used to treat your migraine  · Get regular exercise:  Exercise may help prevent migraines  Talk to your primary healthcare provider about the best exercise plan for you  · Manage stress:  Stress may trigger a migraine  Learn new ways to relax, such as deep breathing  · Stick to a sleep schedule:  Go to bed and get up at the same time each day  · Eat regular meals:  Include healthy foods such as include fruit, vegetables, whole-grain breads, low-fat dairy products, beans, lean meat, and fish  Avoid trigger foods like chocolate, hard cheese, and red wine  Foods that contain gluten, nitrates, MSG, or artificial sweeteners may also trigger migraines  Caffeine, which is often used to treat migraines, can also trigger them  Contact your primary healthcare provider or neurologist if:   · You have a fever  · Your migraines interfere with your daily activities  · Your medicines or treatments stop working  · You have questions about your condition or care    Seek care immediately or call 911 if:   · You have a headache that seems different or much worse than your usual migraine headache  · You have a severe headache with a fever or a stiff neck  · You have new problems with speech, vision, balance, or movement  · You feel like you are going to faint, you become confused, or you have a seizure  © 2014 [address] is for End User's use only and may not be sold, redistributed or otherwise used for commercial purposes  All illustrations and images included in CareNotes® are the copyrighted property of A D A M , Inc  or Mychal Persaud  The above information is an  only  It is not intended as medical advice for individual conditions or treatments  Talk to your doctor, nurse or pharmacist before following any medical regimen to see if it is safe and effective for you

## 2018-10-25 ENCOUNTER — APPOINTMENT (OUTPATIENT)
Dept: PHYSICAL THERAPY | Facility: REHABILITATION | Age: 73
End: 2018-10-25
Payer: MEDICARE

## 2018-10-30 ENCOUNTER — OFFICE VISIT (OUTPATIENT)
Dept: PHYSICAL THERAPY | Facility: REHABILITATION | Age: 73
End: 2018-10-30
Payer: MEDICARE

## 2018-10-30 DIAGNOSIS — M54.2 CERVICALGIA: Primary | ICD-10-CM

## 2018-10-30 PROCEDURE — 97110 THERAPEUTIC EXERCISES: CPT | Performed by: PHYSICAL THERAPIST

## 2018-10-30 PROCEDURE — G8982 BODY POS GOAL STATUS: HCPCS | Performed by: PHYSICAL THERAPIST

## 2018-10-30 PROCEDURE — G8984 CARRY CURRENT STATUS: HCPCS | Performed by: PHYSICAL THERAPIST

## 2018-10-30 PROCEDURE — 97140 MANUAL THERAPY 1/> REGIONS: CPT | Performed by: PHYSICAL THERAPIST

## 2018-10-30 PROCEDURE — G8985 CARRY GOAL STATUS: HCPCS | Performed by: PHYSICAL THERAPIST

## 2018-10-30 PROCEDURE — G8981 BODY POS CURRENT STATUS: HCPCS | Performed by: PHYSICAL THERAPIST

## 2018-10-30 NOTE — PROGRESS NOTES
Daily Note     Today's date: 10/30/2018  Patient name: Paty Melendez  : 1945  MRN: [id number]  Referring provider: Fan Henyr MD  Dx:   Encounter Diagnosis     ICD-10-CM    1  Cervicalgia M54 2                   Subjective: The patient states that last Wednesday night he had a sudden onset of a headache which began behind his ear and spread to behind his ear and to the top of his head  He had his wife call the ambulance which took him to [facility]  He had bloodwork and a CT scan which were both normal  The pain started to subside on Thursday but he continues to experience a dull ache on the top of his head down into his hear  He reports that he has had some issues with almost falling and tripping since his neck pain began  He has not yet notified Dr Yuval Mathews  He follows up with Dr Yuval Mathews on 11/15/18  Objective: See treatment diary below      Assessment: Tolerated treatment well  Patient had limited cervical ROM with assessment  Patient denies Syliva Rued; diplopia; blurry vision; nausea  TTP: left upper trapezius  Vertebal Artery Insufficiency: negative for symptoms bilaterally    Patient did well with exercises performed today  He notes feeling some relief post-treatment, no headache pain  Backed off of exercises today due to recent symptoms  PT instructed patient to call referring physician to notify them he went to the ER for a sudden, severe headache  Plan: Continue per plan of care   Monitor response to treatment    Precautions: Repaired aortic dissection, coronary bypass, chronic pain, depression, GI disease, HTN  Access code: [facility]  * Indicates part of HEP     Daily Treatment Diary     Manual  9/27 10/2 10/4 10/9 10/16 10/18 10/23 10/30     P/A mobilizations grade II in supine             IASTM L UT  15' total STM  10' total STM 15' total  15' total 15' total 15' total 15' STM to LEFT UT     L rotation mobs grade II-III in supine Done  Done  PT  Done  Done  Ludwig Berg Done                                     Exercise Diary  9/27 10/2 10/4 10/9 10/16 10/18 10/23 10/30     Upper trapezius stretch* :05x5 :05x10 :05x10 :05x 10  :10x 10 :10x10 :10x10 :10x10     Levator stretch  :05x10 :05x10 :05x 10  :10x 10 :10x10 :10x10 :10x10     3 finger ROM             Scapular retraction* :05x10 :05x20 :05x20 :05x20 With ER OTB 2x10 With ER OTB 2x10 With ER OTB 2x10 2x10  No TB     Sidelying ER   2x10 2# 2x10 2# 3x10 2# 3x10 2# 3x12       TB rows  PTB 2x10 OTB 2x10 OTB 2x10 GTB 3x10 GTB 3x10 GTB 3x10 GTB  3x10     TB LPD  PTB 2x10 OTB 2x10 OTB 2x10 GTB 3x10 GTB 3x10 GTB 3x10 GT  3x10     Chin tucks  supine 10 supine 20 supine 20  supine 20 supine 20 supine 20 supine 2x10      Sidelying flexion     2x10 3x10 2# 2x10      Deep neck flexor       :03x10                                                                                                                                            Modalities              MHP/ CP PRN

## 2018-11-01 ENCOUNTER — OFFICE VISIT (OUTPATIENT)
Dept: PHYSICAL THERAPY | Facility: REHABILITATION | Age: 73
End: 2018-11-01
Payer: MEDICARE

## 2018-11-01 DIAGNOSIS — M54.2 CERVICALGIA: Primary | ICD-10-CM

## 2018-11-01 PROCEDURE — 97140 MANUAL THERAPY 1/> REGIONS: CPT

## 2018-11-01 PROCEDURE — 97110 THERAPEUTIC EXERCISES: CPT

## 2018-11-01 NOTE — PROGRESS NOTES
Daily Note     Today's date: 2018  Patient name: Leslie Edwards  : 1945  MRN: [id number]  Referring provider: Kelton Santa MD  Dx:   Encounter Diagnosis     ICD-10-CM    1  Cervicalgia M54 2                   Subjective: Patient reports calling the MD in regards to sudden severe headache and is to follow up with MD next Thursday  Patient reports "headache is better, still some throbbing, but much better than it was " Patient continues to report discomfort with L UT, "not constant, but sometimes " Patient requested to leave session 15 min early secondary to having another appointment         Objective: See treatment diary below  Precautions: Repaired aortic dissection, coronary bypass, chronic pain, depression, GI disease, HTN  Access code: [facility]  * Indicates part of HEP     Daily Treatment Diary     Manual  9/27 10/2 10/4 10/9 10/16 10/18 10/23 10/30 11/1    P/A mobilizations grade II in supine             IASTM L UT  15' total STM  10' total STM 15' total  15' total 15' total 15' total 15' STM to LEFT UT 12' STM     L rotation mobs grade II-III in supine Done  Done RH PT  Done  Done  Done [facility] Done                                     Exercise Diary  9/27 10/2 10/4 10/9 10/16 10/18 10/23 10/30 11/1    Upper trapezius stretch* :05x5 :05x10 :05x10 :05x 10  :10x 10 :10x10 :10x10 :10x10 :10x10    Levator stretch  :05x10 :05x10 :05x 10  :10x 10 :10x10 :10x10 :10x10 :10x10    3 finger ROM             Scapular retraction* :05x10 :05x20 :05x20 :05x20 With ER OTB 2x10 With ER OTB 2x10 With ER OTB 2x10 2x10  No TB 10 OTB 10 no TB    Sidelying ER   2x10 2# 2x10 2# 3x10 2# 3x10 2# 3x12   2x10    TB rows  PTB 2x10 OTB 2x10 OTB 2x10 GTB 3x10 GTB 3x10 GTB 3x10 GTB  3x10 GTB 3x10    TB LPD  PTB 2x10 OTB 2x10 OTB 2x10 GTB 3x10 GTB 3x10 GTB 3x10 GT  3x10 GTB 3x10    Chin tucks  supine 10 supine 20 supine 20  supine 20 supine 20 supine 20 supine 2x10  supine 2x10    Sidelying flexion     2x10 3x10 2# 2x10 Deep neck flexor       :03x10                                                                                                                                            Modalities              MHP/ CP PRN                                             Assessment: Tolerated treatment fair  Patient demonstrated fatigue post treatment and would benefit from continued PT Patient reports increased discomfort with completion of scap retraction ER with TB, with patient reporting relief once completing exercise without TB  Cues required for proper form with UT and levator stretching  See re-eval        Plan: Continue per plan of care  Progress treatment as tolerated

## 2018-11-06 ENCOUNTER — OFFICE VISIT (OUTPATIENT)
Dept: PHYSICAL THERAPY | Facility: REHABILITATION | Age: 73
End: 2018-11-06
Payer: MEDICARE

## 2018-11-06 DIAGNOSIS — M54.2 CERVICALGIA: Primary | ICD-10-CM

## 2018-11-06 PROCEDURE — 97110 THERAPEUTIC EXERCISES: CPT | Performed by: PHYSICAL THERAPIST

## 2018-11-06 PROCEDURE — 97140 MANUAL THERAPY 1/> REGIONS: CPT | Performed by: PHYSICAL THERAPIST

## 2018-11-06 PROCEDURE — 97112 NEUROMUSCULAR REEDUCATION: CPT | Performed by: PHYSICAL THERAPIST

## 2018-11-06 NOTE — PROGRESS NOTES
Daily Note     Today's date: 2018  Patient name: Camila Craig  : 1945  MRN: [id number]  Referring provider: Terry Toussaint MD  Dx:   Encounter Diagnosis     ICD-10-CM    1  Cervicalgia M54 2        Start Time: 1130  Stop Time:   Total time in clinic (min): 50 minutes    Subjective: Raymond Ho reports "neck feels pretty good" upon arrival to therapy today  He offers no new complaints after previous therapy session         Objective: See treatment diary below  Precautions: Repaired aortic dissection, coronary bypass, chronic pain, depression, GI disease, HTN  Access code: [facility]  * Indicates part of HEP     Daily Treatment Diary     Manual  9/27 10/2 10/4 10/9 10/16 10/18 10/23 10/30 11/1 11/6   P/A mobilizations grade II in supine             IASTM L UT  15' total STM  10' total STM 15' total  15' total 15' total 15' total 15' STM to LEFT UT 12' STM     L rotation mobs grade II-III in supine Done  Done RH PT  Done  Done  Done KL Done    10'                                  Exercise Diary  9/27 10/2 10/4 10/9 10/16 10/18 10/23 10/30 11/1 11/6   Upper trapezius stretch* :05x5 :05x10 :05x10 :05x 10  :10x 10 :10x10 :10x10 :10x10 :10x10 :10x10   Levator stretch  :05x10 :05x10 :05x 10  :10x 10 :10x10 :10x10 :10x10 :10x10 :10x10   3 finger ROM             Scapular retraction* :05x10 :05x20 :05x20 :05x20 With ER OTB 2x10 With ER OTB 2x10 With ER OTB 2x10 2x10  No TB 10 OTB 10 no TB 2x10 OTB    Sidelying ER   2x10 2# 2x10 2# 3x10 2# 3x10 2# 3x12   2x10 2# 3x10   TB rows  PTB 2x10 OTB 2x10 OTB 2x10 GTB 3x10 GTB 3x10 GTB 3x10 GTB  3x10 GTB 3x10 GTB 3x10   TB LPD  PTB 2x10 OTB 2x10 OTB 2x10 GTB 3x10 GTB 3x10 GTB 3x10 GT  3x10 GTB 3x10 GTB 3x10   Chin tucks  supine 10 supine 20 supine 20  supine 20 supine 20 supine 20 supine 2x10  supine 2x10 supine 2x10   Sidelying flexion     2x10 3x10 2# 2x10      Deep neck flexor       :03x10      TB horizontal abduction          OTB 3x10 Modalities              MHP/ CP PRN                                           Assessment: Tolerated treatment fair  Patient demonstrated fatigue post treatment, exhibited good technique with therapeutic exercises and would benefit from continued PT  Added TB horizontal abduction today with fair tolerance, cueing needed for proper technique and to avoid UT substitution  Restrictions at end-range rotation observed with manuals  Plan: Continue per plan of care  Progress treatment as tolerated

## 2018-11-08 ENCOUNTER — OFFICE VISIT (OUTPATIENT)
Dept: PHYSICAL THERAPY | Facility: REHABILITATION | Age: 73
End: 2018-11-08
Payer: MEDICARE

## 2018-11-08 ENCOUNTER — OFFICE VISIT (OUTPATIENT)
Dept: NEUROLOGY | Facility: CLINIC | Age: 73
End: 2018-11-08
Payer: MEDICARE

## 2018-11-08 VITALS
DIASTOLIC BLOOD PRESSURE: 70 MMHG | BODY MASS INDEX: 32.3 KG/M2 | SYSTOLIC BLOOD PRESSURE: 140 MMHG | HEART RATE: 60 BPM | WEIGHT: 230.7 LBS | HEIGHT: 71 IN

## 2018-11-08 DIAGNOSIS — M54.2 CERVICALGIA: Primary | ICD-10-CM

## 2018-11-08 DIAGNOSIS — G44.209 MUSCLE CONTRACTION HEADACHE: Primary | ICD-10-CM

## 2018-11-08 PROCEDURE — 99212 OFFICE O/P EST SF 10 MIN: CPT | Performed by: PSYCHIATRY & NEUROLOGY

## 2018-11-08 PROCEDURE — 97110 THERAPEUTIC EXERCISES: CPT | Performed by: PHYSICAL THERAPIST

## 2018-11-08 PROCEDURE — 97112 NEUROMUSCULAR REEDUCATION: CPT | Performed by: PHYSICAL THERAPIST

## 2018-11-08 PROCEDURE — 97140 MANUAL THERAPY 1/> REGIONS: CPT | Performed by: PHYSICAL THERAPIST

## 2018-11-08 NOTE — PROGRESS NOTES
Daily Note     Today's date: 2018  Patient name: Camila Craig  : 1945  MRN: [id number]  Referring provider: Terry Toussaint MD  Dx:   Encounter Diagnosis     ICD-10-CM    1  Cervicalgia M54 2        Start Time: 7360  Stop Time: 1130  Total time in clinic (min): 50 minutes    Subjective: Raymond Ho reports "no pain" in cervical spine upon arrival to therapy today  He reports he saw MD earlier today and is to take ibuprofen every 2-3 days to aid with headaches         Objective: See treatment diary below  Precautions: Repaired aortic dissection, coronary bypass, chronic pain, depression, GI disease, HTN  Access code: [email or web address]  * Indicates part of HEP     Daily Treatment Diary     Manual  11/8  10/4 10/9 10/16 10/18 10/23 10/30 11/1 11   P/A mobilizations grade II in supine Done             IASTM L UT 15' total  10' total STM 15' total  15' total 15' total 15' total 15' STM to LEFT UT 12' STM     L rotation mobs grade II-III in supine Done    Done  Done  Done KL Done    10'                                  Exercise Diary  11/8  10/4 10/9 10/16 10/18 10/23 10/30 11/1 116   Upper trapezius stretch* :10x10  :05x10 :05x 10  :10x 10 :10x10 :10x10 :10x10 :10x10 :10x10   Levator stretch :10x10  :05x10 :05x 10  :10x 10 :10x10 :10x10 :10x10 :10x10 :10x10   3 finger ROM             Scapular retraction* :05x10  :05x20 :05x20 With ER OTB 2x10 With ER OTB 2x10 With ER OTB 2x10 2x10  No TB 10 OTB 10 no TB 2x10 OTB    Sidelying ER 2# 3x12  2x10 2# 2x10 2# 3x10 2# 3x10 2# 3x12   2x10 2# 3x10   TB rows   OTB 2x10 OTB 2x10 GTB 3x10 GTB 3x10 GTB 3x10 GTB  3x10 GTB 3x10 GTB 3x10   TB LPD GTB 3x10  OTB 2x10 OTB 2x10 GTB 3x10 GTB 3x10 GTB 3x10 GT  3x10 GTB 3x10 GTB 3x10   Chin tucks  supine 2x10  supine 20  supine 20 supine 20 supine 20 supine 2x10  supine 2x10 supine 2x10   Sidelying flexion     2x10 3x10 2# 2x10      Deep neck flexor       :03x10      TB horizontal abduction OTB 3x10         OTB 3x10 Modalities              MHP/ CP PRN                                           Assessment: Tolerated treatment fair  Patient demonstrated fatigue post treatment, exhibited good technique with therapeutic exercises and would benefit from continued PT  Patient reporting significant fatigue with exercises performed today, most fatigue with TB horizontal abduction and sidelying ER  Improvements with rotation and mobility with manuals performed today  Plan: Continue per plan of care  Progress treatment as tolerated

## 2018-11-08 NOTE — PROGRESS NOTES
Patient ID: Gideon Villanueva  is a 75 y o  male  Assessment/Plan:      Muscle contraction headache    With the patient's chronic neck pain which is under much better control with physical therapy is most likely recent headache was muscle contraction  I gave him further instructions on the use of Motrin and dosage and also encourage continued physical therapy particularly without home exercises      Subjective:    HPI the patient had an episode presenting to emergency department of his more typical neck pain ascending up over the scalp with severe headache following  It resolved  Brain imaging and laboratory work were unremarkable  Objective: There were no vitals taken for this visit  Physical Exam    Neurological Exam no subluxed a puddle tenderness  No temporal region tenderness  Pupils were equal fundi were unremarkable station and gait were normal speech and language were normal cranial nerves were fully intact      ROS:    Review of Systems   Constitutional: Negative  Negative for appetite change and fever  HENT: Negative  Negative for hearing loss, tinnitus, trouble swallowing and voice change  Eyes: Negative  Negative for photophobia and pain  Respiratory: Negative  Negative for shortness of breath  Cardiovascular: Negative  Negative for palpitations  Gastrointestinal: Negative  Negative for nausea and vomiting  Endocrine: Negative  Negative for cold intolerance and heat intolerance  Genitourinary: Negative  Negative for dysuria, frequency and urgency  Musculoskeletal: Negative  Negative for myalgias and neck pain  Skin: Negative  Negative for rash  Neurological: Negative  Negative for dizziness, tremors, seizures, syncope, facial asymmetry, speech difficulty, weakness, light-headedness, numbness and headaches  Sharp head pressure   Hematological: Negative  Does not bruise/bleed easily  Psychiatric/Behavioral: Negative    Negative for confusion, hallucinations and sleep disturbance

## 2018-11-13 ENCOUNTER — OFFICE VISIT (OUTPATIENT)
Dept: PHYSICAL THERAPY | Facility: REHABILITATION | Age: 73
End: 2018-11-13
Payer: MEDICARE

## 2018-11-13 DIAGNOSIS — M54.2 CERVICALGIA: Primary | ICD-10-CM

## 2018-11-13 PROCEDURE — 97140 MANUAL THERAPY 1/> REGIONS: CPT | Performed by: PHYSICAL THERAPIST

## 2018-11-13 PROCEDURE — 97110 THERAPEUTIC EXERCISES: CPT | Performed by: PHYSICAL THERAPIST

## 2018-11-13 NOTE — PROGRESS NOTES
Daily Note     Today's date: 2018  Patient name: Mitesh Villareal  : 1945  MRN: [id number]  Referring provider: Alexandra Payne MD  Dx:   Encounter Diagnosis     ICD-10-CM    1  Cervicalgia M54 2        Start Time: 845  Stop Time: 64  Total time in clinic (min): 30 minutes    Subjective: Vanessa Haney reports "no neck pain" upon arrival to therapy today   He reports "unsure" about medication dosage, PT advised patient to call MD about proper dosage, with patient noting "I've only taken it twice since I've seen the doctor, but I'm not sure if he wants me to take it every day "       Objective: See treatment diary below  Precautions: Repaired aortic dissection, coronary bypass, chronic pain, depression, GI disease, HTN  Access code: Royce Salcido  * Indicates part of HEP     Daily Treatment Diary     Manual  11/8 11/13  10/9 10/16 10/18 10/23 10/30 11/1 11/6   P/A mobilizations grade II in supine Done  Done            IASTM L UT 15' total 12' total  15' total  15' total 15' total 15' total 15' STM to LEFT UT 12' STM     L rotation mobs grade II-III in supine Done  Done   Done  Done  Done KL Done    10'                                  Exercise Diary  11/8 11/13 10/4 10/9 10/16 10/18 10/23 10/30 11/1 116   Upper trapezius stretch* :10x10  :05x10 :05x 10  :10x 10 :10x10 :10x10 :10x10 :10x10 :10x10   Levator stretch :10x10  :05x10 :05x 10  :10x 10 :10x10 :10x10 :10x10 :10x10 :10x10   3 finger ROM             Scapular retraction* :05x10  :05x20 :05x20 With ER OTB 2x10 With ER OTB 2x10 With ER OTB 2x10 2x10  No TB 10 OTB 10 no TB 2x10 OTB    Sidelying ER 2# 3x12 3# 3x10 2x10 2# 2x10 2# 3x10 2# 3x10 2# 3x12   2x10 2# 3x10   TB rows   OTB 2x10 OTB 2x10 GTB 3x10 GTB 3x10 GTB 3x10 GTB  3x10 GTB 3x10 GTB 3x10   TB LPD GTB 3x10  OTB 2x10 OTB 2x10 GTB 3x10 GTB 3x10 GTB 3x10 GT  3x10 GTB 3x10 GTB 3x10   Chin tucks  supine 2x10 supine 2x10 supine 20  supine 20 supine 20 supine 20 supine 2x10  supine 2x10 supine 2x10 Sidelying flexion     2x10 3x10 2# 2x10      Deep neck flexor       :03x10      TB horizontal abduction OTB 3x10 OTB 3x10        OTB 3x10                                                                                                                            Modalities              MHP/ CP PRN                                           Assessment: Tolerated treatment fair  Patient demonstrated fatigue post treatment, exhibited good technique with therapeutic exercises and would benefit from continued PT  Exercises limited secondary to patient requesting to leave early  Patient demonstrating normal mobility in cervical spine with manuals, no hypomobility observed  Plan: Continue per plan of care  Progress treatment as tolerated

## 2018-11-15 ENCOUNTER — OFFICE VISIT (OUTPATIENT)
Dept: PHYSICAL THERAPY | Facility: REHABILITATION | Age: 73
End: 2018-11-15
Payer: MEDICARE

## 2018-11-15 DIAGNOSIS — M54.2 CERVICALGIA: Primary | ICD-10-CM

## 2018-11-15 PROCEDURE — 97110 THERAPEUTIC EXERCISES: CPT

## 2018-11-15 PROCEDURE — 97140 MANUAL THERAPY 1/> REGIONS: CPT

## 2018-11-15 PROCEDURE — 97112 NEUROMUSCULAR REEDUCATION: CPT

## 2018-11-15 NOTE — PROGRESS NOTES
Daily Note     Today's date: 11/15/2018  Patient name: Jessica Crandall  : 1945  MRN: [id number]  Referring provider: David Zaragoza MD  Dx:   Encounter Diagnosis     ICD-10-CM    1  Cervicalgia M54 2                   Subjective: Patient reports feeling "better" prior to session today stating "past few days have been good " He reports he is continuing to take motrin, however states "i forgot to call the doctor to see how much I should be actually taking " Therapist informed patient again to call MD with patient reporting understanding  Patient requested to leave session early         Objective: See treatment diary below  Precautions: Repaired aortic dissection, coronary bypass, chronic pain, depression, GI disease, HTN  Access code: Janette Lombardo  * Indicates part of HEP     Daily Treatment Diary     Manual  11/8 11/13 11/15 10/9 10/16 10/18 10/23 10/30 11/1 11/6   P/A mobilizations grade II in supine Done  Done            IASTM L UT 15' total 12' total 10' total STM 15' total  15' total 15' total 15' total 15' STM to LEFT UT 12' STM     L rotation mobs grade II-III in supine Done  Done   Done  Done  Ronald Prieto Done    10'                                  Exercise Diary  11/8 11/13 11/15  10/16 10/18 10/23 10/30 11/1 11/6   Upper trapezius stretch* :10x10  :10x10  :10x 10 :10x10 :10x10 :10x10 :10x10 :10x10   Levator stretch :10x10  :10x10  :10x 10 :10x10 :10x10 :10x10 :10x10 :10x10   3 finger ROM             Scapular retraction* :05x10  2x10  With ER OTB 2x10 With ER OTB 2x10 With ER OTB 2x10 2x10  No TB 10 OTB 10 no TB 2x10 OTB    Sidelying ER 2# 3x12 3# 3x10 3# 3x10  2# 3x10 2# 3x10 2# 3x12   2x10 2# 3x10   TB rows   GTB 3x10  GTB 3x10 GTB 3x10 GTB 3x10 GTB  3x10 GTB 3x10 GTB 3x10   TB LPD GTB 3x10  GTB 3x10  GTB 3x10 GTB 3x10 GTB 3x10 GT  3x10 GTB 3x10 GTB 3x10   Chin tucks  supine 2x10 supine 2x10 Supine 2x10  supine 20 supine 20 supine 2x10  supine 2x10 supine 2x10   Sidelying flexion   2x10  2x10 3x10 2# 2x10      Deep neck flexor       :03x10      TB horizontal abduction OTB 3x10 OTB 3x10 OTB 3x10       OTB 3x10                                                                                                                            Modalities              MHP/ CP PRN                                             Assessment: Tolerated treatment fair  Patient demonstrated fatigue post treatment, exhibited good technique with therapeutic exercises and would benefit from continued PT Patient denies any increased pain with any TE performed, fatigue noted with sidelying TE  Plan: Continue per plan of care  Progress treatment as tolerated

## 2018-11-20 ENCOUNTER — OFFICE VISIT (OUTPATIENT)
Dept: PHYSICAL THERAPY | Facility: REHABILITATION | Age: 73
End: 2018-11-20
Payer: MEDICARE

## 2018-11-20 DIAGNOSIS — M54.2 CERVICALGIA: Primary | ICD-10-CM

## 2018-11-20 PROCEDURE — 97140 MANUAL THERAPY 1/> REGIONS: CPT | Performed by: PHYSICAL THERAPIST

## 2018-11-20 PROCEDURE — 97112 NEUROMUSCULAR REEDUCATION: CPT | Performed by: PHYSICAL THERAPIST

## 2018-11-20 PROCEDURE — 97110 THERAPEUTIC EXERCISES: CPT | Performed by: PHYSICAL THERAPIST

## 2018-11-20 NOTE — PROGRESS NOTES
Daily Note     Today's date: 2018  Patient name: Camila Craig  : 1945  MRN: [id number]  Referring provider: Terry Toussaint MD  Dx:   Encounter Diagnosis     ICD-10-CM    1  Cervicalgia M54 2        Start Time: 0800  Stop Time: 0845  Total time in clinic (min): 45 minutes    Subjective: Raymond Ho reports "neck has been feeling good lately" upon arrival to therapy today  He offers no new complaints at this time       Objective: See treatment diary below  Precautions: Repaired aortic dissection, coronary bypass, chronic pain, depression, GI disease, HTN  Access code: Barb Kaplan  * Indicates part of HEP     Daily Treatment Diary     Manual  11/8 11/13 11/15 11/20  10/18 10/23 10/30 11/1 11/6   P/A mobilizations grade II in supine Done  Done            IASTM L UT 15' total 12' total 10' total STM Done   15' total 15' total 15' STM to LEFT UT 12' STM     L rotation mobs grade II-III in supine Done  Done   15' total  Done KL Done    10'                                  Exercise Diary  11/8 11/13 11/15 11/ 20  10/18 10/23 10/30 11/1 11   Upper trapezius stretch* :10x10  :10x10   :10x10 :10x10 :10x10 :10x10 :10x10   Levator stretch :10x10  :10x10   :10x10 :10x10 :10x10 :10x10 :10x10   3 finger ROM             Scapular retraction* :05x10  2x10 With ER OTB 3x10  With ER OTB 2x10 With ER OTB 2x10 2x10  No TB 10 OTB 10 no TB 2x10 OTB    Sidelying ER 2# 3x12 3# 3x10 3# 3x10 3# 3x10  2# 3x10 2# 3x12   2x10 2# 3x10   TB rows   GTB 3x10   GTB 3x10 GTB 3x10 GTB  3x10 GTB 3x10 GTB 3x10   TB LPD GTB 3x10  GTB 3x10 GTB 3x10  GTB 3x10 GTB 3x10 GT  3x10 GTB 3x10 GTB 3x10   Chin tucks  supine 2x10 supine 2x10 Supine 2x10 supine 2x10  supine 20 supine 2x10  supine 2x10 supine 2x10   Sidelying flexion   2x10   3x10 2# 2x10      Deep neck flexor       :03x10      TB horizontal abduction OTB 3x10 OTB 3x10 OTB 3x10 OTB 3x10      OTB 3x10 Modalities              MHP/ CP PRN                                             Assessment: Tolerated treatment well  Patient demonstrated fatigue post treatment, exhibited good technique with therapeutic exercises and would benefit from continued PT  Patient demonstrating improved mobility with all manuals performed today  Fatigue with sidelying ER at this time  Will re-evaluate and possible discharge patient next visit  Plan: Continue per plan of care  Progress note during next visit  Progress treatment as tolerated

## 2018-11-23 ENCOUNTER — OFFICE VISIT (OUTPATIENT)
Dept: PHYSICAL THERAPY | Facility: REHABILITATION | Age: 73
End: 2018-11-23
Payer: MEDICARE

## 2018-11-23 DIAGNOSIS — M54.2 CERVICALGIA: Primary | ICD-10-CM

## 2018-11-23 PROCEDURE — G8985 CARRY GOAL STATUS: HCPCS | Performed by: PHYSICAL THERAPIST

## 2018-11-23 PROCEDURE — G8986 CARRY D/C STATUS: HCPCS | Performed by: PHYSICAL THERAPIST

## 2018-11-23 PROCEDURE — 97112 NEUROMUSCULAR REEDUCATION: CPT | Performed by: PHYSICAL THERAPIST

## 2018-11-23 PROCEDURE — 97110 THERAPEUTIC EXERCISES: CPT | Performed by: PHYSICAL THERAPIST

## 2018-11-23 NOTE — PROGRESS NOTES
PT Discharge    Today's date: 2018  Patient name: Natty Scruggs  : 1945  MRN: [id number]  Referring provider: Adilson Pena MD  Dx:   Encounter Diagnosis     ICD-10-CM    1  Cervicalgia M54 2        Start Time: 0800  Stop Time: 0840  Total time in clinic (min): 40 minutes    Assessment  Assessment details: Natty Scruggs  is a 75y o  year old male who presents to  with:   Cervicalgia  (primary encounter diagnosis)    Meseret Sandoval has made the following improvements since beginning PT: decreased pain, increased ROM, increased strength, increased tolerance to activities  and increased postural control and awareness  Meseret Sandoval and PT mutually agree to transition to HEP at this time secondary to gains made in PT  he has been given updated HEP with verbalized understanding and compliance  Meseret Sandoval is encouraged to contact PT with any questions or concerns in the future  Impairments: abnormal muscle tone, abnormal or restricted ROM, activity intolerance, impaired physical strength, lacks appropriate home exercise program, pain with function and poor posture   Functional limitations: reaching, lifting, driving, lateral rotation of the headBarriers to therapy: Repaired aortic dissection, coronary bypass, chronic pain, depression, GI disease, HTN  Understanding of Dx/Px/POC: good   Prognosis: good    Goals  Short-Term Goals:   1  Patient will increased ROM by 10 degrees in 4 weeks- Met  2  Patient will report reduction in frequency and intensity of "shooting pain" into head in 4 weeks  - Met    Long-Term Goals:   1  Patient will demonstrate symmetrical cervical lateral rotation at time of discharge  - Met  2  Patient independent with HEP at time of discharge  - Met  3  Patient will be able to lift with minimal to no pain in cervical at time of discharge  - Met    Plan  Plan details:  Thank you for referring Natty Scruggs  to Physical Therapy at [place] [address] and for the opportunity to coordinate care     Treatment plan discussed with: patient        Subjective Evaluation    History of Present Illness  Mechanism of injury: Rashad Moore has been seen for total of 15 visits for OP PT for cervicalgia  Patient's global rating of change is " Quite a bit better (5) " Patient reports improvements with ROM, driving, and overall pain levels noting less intensity and frequency of both cervical spine pain and headaches  Rashad Moore and PT mutually agree to transition to HEP at this time secondary to gains made in PT  Patient given updated HEP with verbalized understanding and compliance  he is encouraged to contact PT with any questions or concerns in the future  Quality of life: fair    Pain  Current pain ratin  At best pain ratin  At worst pain ratin  Location: cervical spine   Quality: burning (electric impulse)  Relieving factors: medications  Aggravating factors: lifting and overhead activity  Progression: no change    Social Support    Employment status: not working  Hand dominance: right    Treatments  Current treatment: physical therapy  Patient Goals  Patient goals for therapy: decreased pain, increased motion, increased strength and independence with ADLs/IADLs  Patient goal: "want to get rid of this sharp pain "         Objective     Special Questions  Negative for dizziness, faints, headaches, tinnitus, trouble swallowing and visual change    Postural Observations  Seated posture: poor  Standing posture: poor    Additional Postural Observation Details  Patient demonstrates following posture: Increased thoracic kyphosis   Cervical protrusion  Rounded shoulders, increased IR      Palpation   Left   Hypertonic in the suboccipitals and upper trapezius  Tenderness of the suboccipitals and upper trapezius  Trigger point to suboccipitals and upper trapezius       Neurological Testing     Sensation   Cervical/Thoracic   Left   Intact: light touch    Right   Intact: light touch    Reflexes   Left   Biceps (C5/C6): normal (2+)  Brachioradialis (C6): normal (2+)  Triceps (C7): normal (2+)    Right   Biceps (C5/C6): normal (2+)  Brachioradialis (C6): normal (2+)  Triceps (C7): normal (2+)    Additional Neurological Details  Patient denies N/T in R UE and L UE at this time  Sensation intact during IE         Active Range of Motion   Cervical/Thoracic Spine   Cervical    Flexion: 40 degrees   Extension: 40 degrees   Left lateral flexion: 25 degrees   Right lateral flexion: 25 degrees   Left rotation: 70 degrees   Right rotation: 70 degrees     Joint Play Joints within functional limits: C3, C4, C5, C6, C7 and T1     Strength/Myotome Testing   Cervical Spine     Left   Neck lateral flexion (C3): WFL    Right etr  Neck lateral flexion (C3): WFL    Left Shoulder     Planes of Motion   Flexion: 5   Extension: WFL   Abduction: 5 and WFL   Adduction: WFL   External rotation at 0°: 5   Internal rotation at 0°: 5     Right Shoulder     Planes of Motion   Flexion: 4   Extension: WFL   Abduction: 5 and WFL   Adduction: WFL   External rotation at 0°: 5   Internal rotation at 0°: 5     Left Elbow   Flexion: 5 and WFL  Extension: 5    Right Elbow   Flexion: 5 and WFL  Extension: 5    Left Wrist/Hand   Wrist extension: 5 and WFL  Wrist flexion: 5 and WFL  Radial deviation: WFL    Right Wrist/Hand   Wrist extension: 5 and WFL  Wrist flexion: 5 and WFL  Radial deviation: WFL    Tests   Cervical     Left   Negative alar ligament integrity, cervical distraction, Spurling's sign and cervical compression test       Right   Negative alar ligament integrity, cervical distraction, Spurling's sign and cervical compression test         Flowsheet Rows      Most Recent Value   PT/OT G-Codes   Current Score  68   Projected Score  67   Assessment Type  Discharge   G code set  Carrying, Moving & Handling Objects   Carrying, Moving and Handling Objects Goal Status ()  CJ   Carrying, Moving and Handling Objects Discharge Status ()  Indy Avila Precautions: Repaired aortic dissection, coronary bypass, chronic pain, depression, GI disease, HTN  Access code: Skyler Mccollum  * Indicates part of HEP     Daily Treatment Diary     Manual  11/8 11/13 11/15 11/20 11/23  10/23 10/30 11/1 11/6   P/A mobilizations grade II in supine Done  Done    8' total        IASTM L UT 15' total 12' total 10' total STM Done    15' total 15' STM to LEFT UT 12' STM     L rotation mobs grade II-III in supine Done  Done   15' total   Done    10'                                  Exercise Diary  11/8 11/13 11/15 11/ 20 11/ 23  10/23 10/30 11/1 11/6   Upper trapezius stretch* :10x10  :10x10    :10x10 :10x10 :10x10 :10x10   Levator stretch :10x10  :10x10    :10x10 :10x10 :10x10 :10x10   3 finger ROM             Scapular retraction* :05x10  2x10 With ER OTB 3x10 With ER GTB 2x10  With ER OTB 2x10 2x10  No TB 10 OTB 10 no TB 2x10 OTB    Sidelying ER 2# 3x12 3# 3x10 3# 3x10 3# 3x10 3# 3x10  2# 3x12   2x10 2# 3x10   TB rows   GTB 3x10    GTB 3x10 GTB  3x10 GTB 3x10 GTB 3x10   TB LPD GTB 3x10  GTB 3x10 GTB 3x10   GTB 3x10 GT  3x10 GTB 3x10 GTB 3x10   Chin tucks  supine 2x10 supine 2x10 Supine 2x10 supine 2x10 supine 2x10  supine 2x10  supine 2x10 supine 2x10   Sidelying flexion   2x10    2# 2x10      Deep neck flexor       :03x10      TB horizontal abduction OTB 3x10 OTB 3x10 OTB 3x10 OTB 3x10 OTB 3x10     OTB 3x10                                                                                                                            Modalities              MHP/ CP PRN

## 2018-11-27 ENCOUNTER — APPOINTMENT (OUTPATIENT)
Dept: PHYSICAL THERAPY | Facility: REHABILITATION | Age: 73
End: 2018-11-27
Payer: MEDICARE

## 2018-11-29 ENCOUNTER — APPOINTMENT (OUTPATIENT)
Dept: PHYSICAL THERAPY | Facility: REHABILITATION | Age: 73
End: 2018-11-29
Payer: MEDICARE

## 2018-12-04 ENCOUNTER — TELEPHONE (OUTPATIENT)
Dept: PULMONOLOGY | Facility: CLINIC | Age: 73
End: 2018-12-04

## 2018-12-04 NOTE — TELEPHONE ENCOUNTER
Patient called stated that someone called stating that he needs a follow up appt with provider in reference to his Cpap  Called [facility] and spoke with Temo Costello and she stated that they just need the notes from august for medicare     Emailed notes    Called patient and explained the situation

## 2018-12-07 DIAGNOSIS — F33.40 RECURRENT MAJOR DEPRESSIVE DISORDER, IN REMISSION (HCC): Primary | ICD-10-CM

## 2018-12-08 RX ORDER — DULOXETIN HYDROCHLORIDE 60 MG/1
60 CAPSULE, DELAYED RELEASE ORAL DAILY
Qty: 90 CAPSULE | Refills: 1 | Status: SHIPPED | OUTPATIENT
Start: 2018-12-08 | End: 2019-01-03 | Stop reason: SDUPTHER

## 2019-01-03 ENCOUNTER — OFFICE VISIT (OUTPATIENT)
Dept: PSYCHIATRY | Facility: CLINIC | Age: 74
End: 2019-01-03
Payer: MEDICARE

## 2019-01-03 DIAGNOSIS — F33.40 RECURRENT MAJOR DEPRESSIVE DISORDER, IN REMISSION (HCC): ICD-10-CM

## 2019-01-03 DIAGNOSIS — F41.1 GAD (GENERALIZED ANXIETY DISORDER): ICD-10-CM

## 2019-01-03 PROCEDURE — 99213 OFFICE O/P EST LOW 20 MIN: CPT | Performed by: PSYCHIATRY & NEUROLOGY

## 2019-01-03 RX ORDER — ALPRAZOLAM 0.25 MG/1
0.25 TABLET ORAL
Qty: 90 TABLET | Refills: 1 | Status: SHIPPED | OUTPATIENT
Start: 2019-01-03 | End: 2019-07-02 | Stop reason: SDUPTHER

## 2019-01-03 RX ORDER — DULOXETIN HYDROCHLORIDE 60 MG/1
60 CAPSULE, DELAYED RELEASE ORAL DAILY
Qty: 90 CAPSULE | Refills: 1 | Status: SHIPPED | OUTPATIENT
Start: 2019-01-03 | End: 2019-07-02 | Stop reason: SDUPTHER

## 2019-01-03 NOTE — PSYCH
Patient ID: Jody Bryant  is a 75 y o  male  HPI ROS Appetite Changes and Sleep: decreased appetite, normal energy level, recent weight loss(a fewlbs) and normal number of sleep hours    Review Of Systems:     Mood Normal   Thought Content Normal   General Normal    Gastrointestinal Normal   Neurological Normal        Laboratory Results: No results found for this or any previous visit  Subjective:    Seen for depression   Doing well   Sleeping well  Appetite is not as good as before  Good relationship with the family  No new health problems      Objective: In remission  Mental status:  Appearance calm and cooperative , adequate hygiene and grooming and good eye contact    Mood euthymic   Affect affect was broad   Speech Normal rate and Normal volume   Thought Processes coherent/organized   Hallucinations no hallucinations present    Thought Content no delusional thoughts verbalized   Abnormal Thoughts no suicidal thoughts  and no homicidal thoughts    Orientation A+O x 3   Memory function Not tested   Attention Span concentration intact   Intellect Not Formally Assessed   Insight Insight intact   Judgement judgment was intact   Muscle Strength Muscle strength and tone were normal and Normal gait    Language no difficulty naming common objects and no difficulty repeating a phrase    Pain none       Assessment/Plan:       Diagnoses and all orders for this visit:    Recurrent major depressive disorder, in remission (HCC)  -     DULoxetine (CYMBALTA) 60 mg delayed release capsule; Take 1 capsule (60 mg total) by mouth daily    DAVIE (generalized anxiety disorder)  -     ALPRAZolam (XANAX) 0 25 mg tablet;  Take 1 tablet (0 25 mg total) by mouth daily at bedtime as needed for anxiety            Treatment Recommendations- Risks Benefits      Risks, Benefits And Possible Side Effects Of Medications:  Risks, benefits, and possible side effects of medications explained to patient and patient verbalizes understanding

## 2019-03-24 ENCOUNTER — APPOINTMENT (EMERGENCY)
Dept: CT IMAGING | Facility: HOSPITAL | Age: 74
End: 2019-03-24
Payer: MEDICARE

## 2019-03-24 ENCOUNTER — HOSPITAL ENCOUNTER (EMERGENCY)
Facility: HOSPITAL | Age: 74
Discharge: HOME | End: 2019-03-24
Attending: EMERGENCY MEDICINE | Admitting: EMERGENCY MEDICINE
Payer: MEDICARE

## 2019-03-24 VITALS
BODY MASS INDEX: 31.3 KG/M2 | TEMPERATURE: 97.3 F | WEIGHT: 224.43 LBS | OXYGEN SATURATION: 95 % | HEART RATE: 70 BPM | SYSTOLIC BLOOD PRESSURE: 123 MMHG | DIASTOLIC BLOOD PRESSURE: 64 MMHG | RESPIRATION RATE: 18 BRPM

## 2019-03-24 DIAGNOSIS — N20.0 NEPHROLITHIASIS: ICD-10-CM

## 2019-03-24 DIAGNOSIS — R10.9 FLANK PAIN: Primary | ICD-10-CM

## 2019-03-24 LAB
ALBUMIN SERPL BCP-MCNC: 3.3 G/DL (ref 3.5–5)
ALP SERPL-CCNC: 132 U/L (ref 46–116)
ALT SERPL W P-5'-P-CCNC: 22 U/L (ref 12–78)
ANION GAP SERPL CALCULATED.3IONS-SCNC: 9 MMOL/L (ref 4–13)
APTT PPP: 77 SECONDS (ref 26–38)
AST SERPL W P-5'-P-CCNC: 26 U/L (ref 5–45)
ATRIAL RATE: 62 BPM
BACTERIA UR QL AUTO: ABNORMAL /HPF
BASE EXCESS BLDA CALC-SCNC: 2 MMOL/L (ref -2–3)
BASOPHILS # BLD AUTO: 0.03 THOUSANDS/ΜL (ref 0–0.1)
BASOPHILS NFR BLD AUTO: 1 % (ref 0–1)
BILIRUB SERPL-MCNC: 0.9 MG/DL (ref 0.2–1)
BILIRUB UR QL STRIP: NEGATIVE
BUN BLD-MCNC: 23 MG/DL (ref 5–25)
BUN SERPL-MCNC: 22 MG/DL (ref 5–25)
CA-I BLD-SCNC: 1.19 MMOL/L (ref 1.12–1.32)
CALCIUM SERPL-MCNC: 8.9 MG/DL (ref 8.3–10.1)
CHLORIDE BLD-SCNC: 103 MMOL/L (ref 100–108)
CHLORIDE SERPL-SCNC: 105 MMOL/L (ref 100–108)
CLARITY UR: CLEAR
CO2 SERPL-SCNC: 28 MMOL/L (ref 21–32)
COLOR UR: ABNORMAL
CREAT SERPL-MCNC: 1.33 MG/DL (ref 0.6–1.3)
EOSINOPHIL # BLD AUTO: 0.14 THOUSAND/ΜL (ref 0–0.61)
EOSINOPHIL NFR BLD AUTO: 2 % (ref 0–6)
ERYTHROCYTE [DISTWIDTH] IN BLOOD BY AUTOMATED COUNT: 12.9 % (ref 11.6–15.1)
GFR SERPL CREATININE-BSD FRML MDRD: 52 ML/MIN/1.73SQ M
GLUCOSE SERPL-MCNC: 130 MG/DL (ref 65–140)
GLUCOSE SERPL-MCNC: 139 MG/DL (ref 65–140)
GLUCOSE UR STRIP-MCNC: NEGATIVE MG/DL
HCO3 BLDA-SCNC: 27.4 MMOL/L (ref 24–30)
HCT VFR BLD AUTO: 42.7 % (ref 36.5–49.3)
HCT VFR BLD CALC: 40 % (ref 36.5–49.3)
HCT VFR BLD CALC: 41 % (ref 36.5–49.3)
HGB BLD-MCNC: 14.7 G/DL (ref 12–17)
HGB BLDA-MCNC: 13.6 G/DL (ref 12–17)
HGB BLDA-MCNC: 13.9 G/DL (ref 12–17)
HGB UR QL STRIP.AUTO: ABNORMAL
IMM GRANULOCYTES # BLD AUTO: 0.02 THOUSAND/UL (ref 0–0.2)
IMM GRANULOCYTES NFR BLD AUTO: 0 % (ref 0–2)
INR PPP: 1.04 (ref 0.86–1.17)
KETONES UR STRIP-MCNC: NEGATIVE MG/DL
LEUKOCYTE ESTERASE UR QL STRIP: NEGATIVE
LIPASE SERPL-CCNC: 136 U/L (ref 73–393)
LYMPHOCYTES # BLD AUTO: 1.23 THOUSANDS/ΜL (ref 0.6–4.47)
LYMPHOCYTES NFR BLD AUTO: 19 % (ref 14–44)
MCH RBC QN AUTO: 31.7 PG (ref 26.8–34.3)
MCHC RBC AUTO-ENTMCNC: 34.4 G/DL (ref 31.4–37.4)
MCV RBC AUTO: 92 FL (ref 82–98)
MONOCYTES # BLD AUTO: 0.53 THOUSAND/ΜL (ref 0.17–1.22)
MONOCYTES NFR BLD AUTO: 8 % (ref 4–12)
NEUTROPHILS # BLD AUTO: 4.66 THOUSANDS/ΜL (ref 1.85–7.62)
NEUTS SEG NFR BLD AUTO: 70 % (ref 43–75)
NITRITE UR QL STRIP: NEGATIVE
NON-SQ EPI CELLS URNS QL MICRO: ABNORMAL /HPF
NRBC BLD AUTO-RTO: 0 /100 WBCS
P AXIS: 65 DEGREES
PCO2 BLD: 29 MMOL/L (ref 21–32)
PCO2 BLD: 45.6 MM HG (ref 42–50)
PH BLD: 7.39 [PH] (ref 7.3–7.4)
PH UR STRIP.AUTO: 7 [PH]
PLATELET # BLD AUTO: 216 THOUSANDS/UL (ref 149–390)
PMV BLD AUTO: 8.4 FL (ref 8.9–12.7)
PO2 BLD: 31 MM HG (ref 35–45)
POTASSIUM BLD-SCNC: 4.9 MMOL/L (ref 3.5–5.3)
POTASSIUM BLD-SCNC: 4.9 MMOL/L (ref 3.5–5.3)
POTASSIUM SERPL-SCNC: 5 MMOL/L (ref 3.5–5.3)
PR INTERVAL: 260 MS
PROT SERPL-MCNC: 6.8 G/DL (ref 6.4–8.2)
PROT UR STRIP-MCNC: NEGATIVE MG/DL
PROTHROMBIN TIME: 13.3 SECONDS (ref 11.8–14.2)
QRS AXIS: 9 DEGREES
QRSD INTERVAL: 92 MS
QT INTERVAL: 444 MS
QTC INTERVAL: 424 MS
RBC # BLD AUTO: 4.64 MILLION/UL (ref 3.88–5.62)
RBC #/AREA URNS AUTO: ABNORMAL /HPF
SAO2 % BLD FROM PO2: 59 % (ref 95–98)
SODIUM BLD-SCNC: 140 MMOL/L (ref 136–145)
SODIUM BLD-SCNC: 141 MMOL/L (ref 136–145)
SODIUM SERPL-SCNC: 142 MMOL/L (ref 136–145)
SP GR UR STRIP.AUTO: 1.01 (ref 1–1.03)
SPECIMEN SOURCE: ABNORMAL
SPECIMEN SOURCE: NORMAL
T WAVE AXIS: 44 DEGREES
TROPONIN I SERPL-MCNC: <0.02 NG/ML
UROBILINOGEN UR QL STRIP.AUTO: 0.2 E.U./DL
VENTRICULAR RATE: 55 BPM
WBC # BLD AUTO: 6.61 THOUSAND/UL (ref 4.31–10.16)
WBC #/AREA URNS AUTO: ABNORMAL /HPF

## 2019-03-24 PROCEDURE — 81001 URINALYSIS AUTO W/SCOPE: CPT | Performed by: EMERGENCY MEDICINE

## 2019-03-24 PROCEDURE — 84484 ASSAY OF TROPONIN QUANT: CPT | Performed by: EMERGENCY MEDICINE

## 2019-03-24 PROCEDURE — 85610 PROTHROMBIN TIME: CPT | Performed by: EMERGENCY MEDICINE

## 2019-03-24 PROCEDURE — 82803 BLOOD GASES ANY COMBINATION: CPT

## 2019-03-24 PROCEDURE — 96365 THER/PROPH/DIAG IV INF INIT: CPT

## 2019-03-24 PROCEDURE — 85014 HEMATOCRIT: CPT

## 2019-03-24 PROCEDURE — 85730 THROMBOPLASTIN TIME PARTIAL: CPT | Performed by: EMERGENCY MEDICINE

## 2019-03-24 PROCEDURE — 84295 ASSAY OF SERUM SODIUM: CPT

## 2019-03-24 PROCEDURE — 82947 ASSAY GLUCOSE BLOOD QUANT: CPT

## 2019-03-24 PROCEDURE — 71275 CT ANGIOGRAPHY CHEST: CPT

## 2019-03-24 PROCEDURE — 83690 ASSAY OF LIPASE: CPT | Performed by: EMERGENCY MEDICINE

## 2019-03-24 PROCEDURE — 85025 COMPLETE CBC W/AUTO DIFF WBC: CPT | Performed by: EMERGENCY MEDICINE

## 2019-03-24 PROCEDURE — 82330 ASSAY OF CALCIUM: CPT

## 2019-03-24 PROCEDURE — 36415 COLL VENOUS BLD VENIPUNCTURE: CPT | Performed by: EMERGENCY MEDICINE

## 2019-03-24 PROCEDURE — 96366 THER/PROPH/DIAG IV INF ADDON: CPT

## 2019-03-24 PROCEDURE — 93005 ELECTROCARDIOGRAM TRACING: CPT

## 2019-03-24 PROCEDURE — 80053 COMPREHEN METABOLIC PANEL: CPT | Performed by: EMERGENCY MEDICINE

## 2019-03-24 PROCEDURE — 93010 ELECTROCARDIOGRAM REPORT: CPT | Performed by: INTERNAL MEDICINE

## 2019-03-24 PROCEDURE — 84132 ASSAY OF SERUM POTASSIUM: CPT

## 2019-03-24 PROCEDURE — 74174 CTA ABD&PLVS W/CONTRAST: CPT

## 2019-03-24 PROCEDURE — 99284 EMERGENCY DEPT VISIT MOD MDM: CPT

## 2019-03-24 RX ORDER — FENTANYL CITRATE 50 UG/ML
1 INJECTION, SOLUTION INTRAMUSCULAR; INTRAVENOUS ONCE
Status: COMPLETED | OUTPATIENT
Start: 2019-03-24 | End: 2019-03-24

## 2019-03-24 RX ORDER — ONDANSETRON 2 MG/ML
1 INJECTION INTRAMUSCULAR; INTRAVENOUS ONCE
Status: COMPLETED | OUTPATIENT
Start: 2019-03-24 | End: 2019-03-24

## 2019-03-24 RX ADMIN — SODIUM CHLORIDE 2.5 MG/HR: 0.9 INJECTION, SOLUTION INTRAVENOUS at 04:51

## 2019-03-24 RX ADMIN — IOHEXOL 100 ML: 350 INJECTION, SOLUTION INTRAVENOUS at 05:36

## 2019-03-24 NOTE — ED PROVIDER NOTES
History  Chief Complaint   Patient presents with    Flank Pain     Pt arrived via EMS  Woke up this am at 0200 with L flank pain  Pt c/o frequency with urination  HPI     Patient is a 75year old male that reports with left flank pain and urinary frequency that started at 0200  Patient has a history of aortic dissection and AAA  No numbness tingling or weakness  No f/c/s  Some nausea  Patient also has a history of kidney stone and reports this is similar to prior stones  MDM 75year old male differential is stone vs dissection vs other, will image  The patient (and any family present) verbalized understanding of the discharge instructions and warnings that would necessitate return to the Emergency Department  Gave verbal in addition to written discharge instructions  Specifically highlighted areas of special concern regarding the written and verbal discharge instructions and return precautions  All questions were answered prior to discharge  Prior to Admission Medications   Prescriptions Last Dose Informant Patient Reported? Taking?    ALPRAZolam (XANAX) 0 25 mg tablet   No No   Sig: Take 1 tablet (0 25 mg total) by mouth daily at bedtime as needed for anxiety   BUTALBITAL-ACETAMINOPHEN PO  Self Yes No   Sig: Take by mouth   Cholecalciferol (VITAMIN D3) 02620 units CAPS  Self Yes No   Sig: Take by mouth   DULoxetine (CYMBALTA) 60 mg delayed release capsule   No No   Sig: Take 1 capsule (60 mg total) by mouth daily   acetaminophen (TYLENOL) 80 mg chewable tablet   Yes No   Sig: Chew 80 mg every 4 (four) hours as needed for mild pain   aspirin 81 MG tablet  Self Yes No   Sig: Take 1 tablet by mouth daily   atorvastatin (LIPITOR) 40 mg tablet  Self Yes No   Sig: Take 40 mg by mouth daily   labetalol (NORMODYNE) 100 mg tablet  Self Yes No   Sig: Take 0 5 tablets by mouth 2 (two) times a day   omeprazole (PriLOSEC) 40 MG capsule  Self Yes No      Facility-Administered Medications: None       Past Medical History:   Diagnosis Date    Anxiety     Aortic dissection (HCC)     Depression     Hyperlipidemia     Hypertension     Kidney stone     Renal cyst     Renal cyst     Sleep apnea        Past Surgical History:   Procedure Laterality Date    ABDOMINAL AORTIC ANEURYSM REPAIR      CELIAC ARTERY STENT      KIDNEY STONE SURGERY         Family History   Problem Relation Age of Onset    Stroke Mother     Prostate cancer Father      I have reviewed and agree with the history as documented  Social History     Tobacco Use    Smoking status: Former Smoker     Packs/day: 1 00     Years: 24 00     Pack years: 24 00     Types: Cigarettes     Start date: 1959     Last attempt to quit: 1983     Years since quittin 9    Smokeless tobacco: Never Used   Substance Use Topics    Alcohol use: No    Drug use: No        Review of Systems   Genitourinary: Positive for flank pain  All other systems reviewed and are negative  Physical Exam  Physical Exam   Constitutional: He is oriented to person, place, and time  He appears well-developed and well-nourished  HENT:   Head: Normocephalic and atraumatic  Eyes: Pupils are equal, round, and reactive to light  EOM are normal    Neck: Normal range of motion  Neck supple  Cardiovascular: Normal rate, regular rhythm and normal heart sounds  No murmur heard  Pulmonary/Chest: Effort normal and breath sounds normal  No respiratory distress  He has no wheezes  Abdominal: Soft  Bowel sounds are normal  He exhibits no distension  There is no tenderness  Musculoskeletal: Normal range of motion  He exhibits no edema or tenderness  Neurological: He is alert and oriented to person, place, and time  No cranial nerve deficit  Coordination normal    Skin: Skin is warm and dry  He is not diaphoretic  No erythema  Psychiatric: He has a normal mood and affect   His behavior is normal    Nursing note and vitals reviewed        Vital Signs  ED Triage Vitals   Temperature Pulse Respirations Blood Pressure SpO2   03/24/19 0430 03/24/19 0424 03/24/19 0424 03/24/19 0424 03/24/19 0424   (!) 97 3 °F (36 3 °C) (!) 53 20 (!) 222/99 97 %      Temp Source Heart Rate Source Patient Position - Orthostatic VS BP Location FiO2 (%)   03/24/19 0430 03/24/19 0424 03/24/19 0424 03/24/19 0424 --   Oral Monitor Lying Right arm       Pain Score       03/24/19 0424       5           Vitals:    03/24/19 0424 03/24/19 0443 03/24/19 0630   BP: (!) 222/99 169/72 123/64   Pulse: (!) 53 (!) 53 70   Patient Position - Orthostatic VS: Lying Lying Lying         Visual Acuity      ED Medications  Medications   fentanyl citrate (PF) (FOR EMS ONLY) 100 mcg/2 mL injection 100 mcg (0 mcg Does not apply Given to EMS 3/24/19 0454)   ondansetron (FOR EMS ONLY) (ZOFRAN) 4 mg/2 mL injection 4 mg (0 mg Does not apply Given to EMS 3/24/19 0454)   iohexol (OMNIPAQUE) 350 MG/ML injection (MULTI-DOSE) 100 mL (100 mL Intravenous Given 3/24/19 0536)       Diagnostic Studies  Results Reviewed     Procedure Component Value Units Date/Time    Urine Microscopic [[id number]]  (Abnormal) Collected:  03/24/19 0626    Lab Status:  Final result Specimen:  Urine, Clean Catch Updated:  03/24/19 0718     RBC, UA 2-4 /hpf      WBC, UA None Seen /hpf      Epithelial Cells None Seen /hpf      Bacteria, UA None Seen /hpf     UA w Reflex to Microscopic w Reflex to Culture [[id number]]  (Abnormal) Collected:  03/24/19 0626    Lab Status:  Final result Specimen:  Urine, Clean Catch Updated:  03/24/19 0635     Color, UA Light Yellow     Clarity, UA Clear     Specific Gravity, UA 1 010     pH, UA 7 0     Leukocytes, UA Negative     Nitrite, UA Negative     Protein, UA Negative mg/dl      Glucose, UA Negative mg/dl      Ketones, UA Negative mg/dl      Urobilinogen, UA 0 2 E U /dl      Bilirubin, UA Negative     Blood, UA Moderate    Troponin I [[id number]]  (Normal) Collected:  03/24/19 0438    Lab Status:  Final result Specimen:  Blood from Arm, Right Updated:  03/24/19 0512     Troponin I <0 02 ng/mL     Comprehensive metabolic panel [[id number]]  (Abnormal) Collected:  03/24/19 0438    Lab Status:  Final result Specimen:  Blood from Arm, Right Updated:  03/24/19 0510     Sodium 142 mmol/L      Potassium 5 0 mmol/L      Chloride 105 mmol/L      CO2 28 mmol/L      ANION GAP 9 mmol/L      BUN 22 mg/dL      Creatinine 1 33 mg/dL      Glucose 139 mg/dL      Calcium 8 9 mg/dL      AST 26 U/L      ALT 22 U/L      Alkaline Phosphatase 132 U/L      Total Protein 6 8 g/dL      Albumin 3 3 g/dL      Total Bilirubin 0 90 mg/dL      eGFR 52 ml/min/1 73sq m     Narrative:       National Kidney Disease Education Program recommendations are as follows:  GFR calculation is accurate only with a steady state creatinine  Chronic Kidney disease less than 60 ml/min/1 73 sq  meters  Kidney failure less than 15 ml/min/1 73 sq  meters      Lipase [[id number]]  (Normal) Collected:  03/24/19 0438    Lab Status:  Final result Specimen:  Blood from Arm, Right Updated:  03/24/19 0510     Lipase 136 u/L     POCT Chem 8+ [[id number]] Collected:  03/24/19 0502    Lab Status:  Final result Specimen:  Venous Updated:  03/24/19 0509     SODIUM, I-STAT 141 mmol/l      Potassium, i-STAT 4 9 mmol/L      Chloride, istat 103 mmol/L      CO2, i-STAT -- mmol/L      Anion Gap, i-STAT -- mmol/L      Calcium, Ionized i-STAT -- mmol/L      BUN, I-STAT 23 mg/dl      Creatinine, i-STAT -- mg/dl      eGFR -- ml/min/1 73sq m      Glucose, i-STAT -- mg/dl      Hct, i-STAT 41 %      Hgb, i-STAT 13 9 g/dl      Specimen Type VENOUS    POCT Blood Gas (CG8+) [[id number]]  (Abnormal) Collected:  03/24/19 0454    Lab Status:  Final result Specimen:  Venous Updated:  03/24/19 0509     ph, Shiva ISTAT 7 387     pCO2, Shiva i-STAT 45 6 mm HG      pO2, Shiva i-STAT 31 0 mm HG      BE, i-STAT 2 mmol/L      HCO3, Shiva i-STAT 27 4 mmol/L      CO2, i-STAT 29 mmol/L      O2 Sat, i-STAT 59 %      SODIUM, I-STAT 140 mmol/l      Potassium, i-STAT 4 9 mmol/L      Calcium, Ionized i-STAT 1 19 mmol/L      Hct, i-STAT 40 %      Hgb, i-STAT 13 6 g/dl      Glucose, i-STAT 130 mg/dl      Specimen Type VENOUS    Protime-INR [[id number]]  (Normal) Collected:  03/24/19 0438    Lab Status:  Final result Specimen:  Blood from Arm, Right Updated:  03/24/19 0504     Protime 13 3 seconds      INR 1 04    APTT [[id number]]  (Abnormal) Collected:  03/24/19 0438    Lab Status:  Final result Specimen:  Blood from Arm, Right Updated:  03/24/19 0504     PTT 77 seconds     CBC and differential [[id number]]  (Abnormal) Collected:  03/24/19 0438    Lab Status:  Final result Specimen:  Blood from Arm, Right Updated:  03/24/19 0445     WBC 6 61 Thousand/uL      RBC 4 64 Million/uL      Hemoglobin 14 7 g/dL      Hematocrit 42 7 %      MCV 92 fL      MCH 31 7 pg      MCHC 34 4 g/dL      RDW 12 9 %      MPV 8 4 fL      Platelets 008 Thousands/uL      nRBC 0 /100 WBCs      Neutrophils Relative 70 %      Immat GRANS % 0 %      Lymphocytes Relative 19 %      Monocytes Relative 8 %      Eosinophils Relative 2 %      Basophils Relative 1 %      Neutrophils Absolute 4 66 Thousands/µL      Immature Grans Absolute 0 02 Thousand/uL      Lymphocytes Absolute 1 23 Thousands/µL      Monocytes Absolute 0 53 Thousand/µL      Eosinophils Absolute 0 14 Thousand/µL      Basophils Absolute 0 03 Thousands/µL                  CTA chest abdomen pelvis w wo contrast   Final Result by Mariana Fabry, DO (03/24 0801)   1  Mild left-sided perinephric and periureteric inflammation with mild left-sided hydroureteronephrosis  No calculi are seen in the collecting system or ureter  There is however a 2 mm calculus in the urinary bladder, most compatible with recently    passed calculus from the left collecting system  2   Prostatomegaly with probable mild bladder outlet obstruction    Urologic consultation and correlation with serum PSA recommended on outpatient basis  3   Stable appearance of the thoracoabdominal aorta  4   Diffuse interstitial prominence and groundglass infiltrates within the lungs, suggesting mild volume overload  I personally discussed this study with Rajwinder Willis on 3/24/2019 at 5:53 AM                Workstation performed: [id number]                    Procedures  Procedures       Phone Contacts  ED Phone Contact    ED Course  ED Course as of Mar 25 0736   Sun Mar 24, 2019   6495 Disucssed with paparo, ct unchanged                                  MDM    Disposition  Final diagnoses:   Flank pain   Nephrolithiasis     Time reflects when diagnosis was documented in both MDM as applicable and the Disposition within this note     Time User Action Codes Description Comment    3/24/2019  6:50 AM Isauro Blackwell, [address] [R10 9] Flank pain     3/24/2019  6:50 AM Isauro Blackwell, [address] [N20 0] Nephrolithiasis       ED Disposition     ED Disposition Condition Date/Time Comment    Discharge Stable [place] Mar 24, 2019  6:50 AM Jayda Tamayo  discharge to home/self care  Follow-up Information     Follow up With Specialties Details Why Priyank Mcghee MD Family Medicine In 2 days  [address]  [address]  [facility]  [facility]            Discharge Medication List as of 3/24/2019  6:50 AM      CONTINUE these medications which have NOT CHANGED    Details   acetaminophen (TYLENOL) 80 mg chewable tablet Chew 80 mg every 4 (four) hours as needed for mild pain, Historical Med      ALPRAZolam (XANAX) 0 25 mg tablet Take 1 tablet (0 25 mg total) by mouth daily at bedtime as needed for anxiety, Starting Thu 1/3/2019, Normal      aspirin 81 MG tablet Take 1 tablet by mouth daily, Starting Mon 3/30/2015, Historical Med      atorvastatin (LIPITOR) 40 mg tablet Take 40 mg by mouth daily, Starting Mon 4/9/2018, Historical Med      BUTALBITAL-ACETAMINOPHEN PO Take by mouth, Historical Med Cholecalciferol (VITAMIN D3) 62090 units CAPS Take by mouth, Starting Mon 4/17/2017, Historical Med      DULoxetine (CYMBALTA) 60 mg delayed release capsule Take 1 capsule (60 mg total) by mouth daily, Starting Thu 1/3/2019, Normal      labetalol (NORMODYNE) 100 mg tablet Take 0 5 tablets by mouth 2 (two) times a day, Starting Mon 3/30/2015, Historical Med      omeprazole (PriLOSEC) 40 MG capsule Starting Mon 4/30/2018, Historical Med           No discharge procedures on file      ED Provider  Electronically Signed by           Dalila Zelaya MD  03/25/19 8710

## 2019-03-24 NOTE — ED NOTES
Pt  Ambulated out of dept , vss, normal gait, no acute distress       Jessi Monge, TINO  03/24/19 0968

## 2019-05-01 ENCOUNTER — APPOINTMENT (OUTPATIENT)
Dept: LAB | Facility: CLINIC | Age: 74
End: 2019-05-01
Payer: MEDICARE

## 2019-05-01 ENCOUNTER — TRANSCRIBE ORDERS (OUTPATIENT)
Dept: LAB | Facility: CLINIC | Age: 74
End: 2019-05-01

## 2019-05-01 DIAGNOSIS — R73.09 IMPAIRED GLUCOSE TOLERANCE TEST: ICD-10-CM

## 2019-05-01 DIAGNOSIS — Z12.5 SPECIAL SCREENING FOR MALIGNANT NEOPLASM OF PROSTATE: ICD-10-CM

## 2019-05-01 DIAGNOSIS — E29.1 HYPOGONADISM MALE: ICD-10-CM

## 2019-05-01 DIAGNOSIS — E78.5 HYPERLIPIDEMIA, UNSPECIFIED HYPERLIPIDEMIA TYPE: ICD-10-CM

## 2019-05-01 DIAGNOSIS — E78.9 DISORDER OF LIPOPROTEIN AND LIPID METABOLISM: Primary | ICD-10-CM

## 2019-05-01 DIAGNOSIS — E78.9 DISORDER OF LIPOPROTEIN AND LIPID METABOLISM: ICD-10-CM

## 2019-05-01 LAB
ALBUMIN SERPL BCP-MCNC: 3.4 G/DL (ref 3.5–5)
ALP SERPL-CCNC: 113 U/L (ref 46–116)
ALT SERPL W P-5'-P-CCNC: 30 U/L (ref 12–78)
ANION GAP SERPL CALCULATED.3IONS-SCNC: 7 MMOL/L (ref 4–13)
AST SERPL W P-5'-P-CCNC: 28 U/L (ref 5–45)
BILIRUB SERPL-MCNC: 0.8 MG/DL (ref 0.2–1)
BUN SERPL-MCNC: 21 MG/DL (ref 5–25)
CALCIUM SERPL-MCNC: 9.2 MG/DL (ref 8.3–10.1)
CHLORIDE SERPL-SCNC: 106 MMOL/L (ref 100–108)
CHOLEST SERPL-MCNC: 163 MG/DL (ref 50–200)
CO2 SERPL-SCNC: 28 MMOL/L (ref 21–32)
CREAT SERPL-MCNC: 1.14 MG/DL (ref 0.6–1.3)
CREAT UR-MCNC: 156 MG/DL
EST. AVERAGE GLUCOSE BLD GHB EST-MCNC: 120 MG/DL
GFR SERPL CREATININE-BSD FRML MDRD: 63 ML/MIN/1.73SQ M
GLUCOSE P FAST SERPL-MCNC: 112 MG/DL (ref 65–99)
HBA1C MFR BLD: 5.8 % (ref 4.2–6.3)
MICROALBUMIN UR-MCNC: 8.3 MG/L (ref 0–20)
MICROALBUMIN/CREAT 24H UR: 5 MG/G CREATININE (ref 0–30)
POTASSIUM SERPL-SCNC: 4.2 MMOL/L (ref 3.5–5.3)
PROT SERPL-MCNC: 7 G/DL (ref 6.4–8.2)
PSA SERPL-MCNC: 3 NG/ML (ref 0–4)
SODIUM SERPL-SCNC: 141 MMOL/L (ref 136–145)

## 2019-05-01 PROCEDURE — 82465 ASSAY BLD/SERUM CHOLESTEROL: CPT

## 2019-05-01 PROCEDURE — 82043 UR ALBUMIN QUANTITATIVE: CPT | Performed by: NEUROMUSCULOSKELETAL MEDICINE & OMM

## 2019-05-01 PROCEDURE — 80053 COMPREHEN METABOLIC PANEL: CPT

## 2019-05-01 PROCEDURE — G0103 PSA SCREENING: HCPCS

## 2019-05-01 PROCEDURE — 83036 HEMOGLOBIN GLYCOSYLATED A1C: CPT

## 2019-05-01 PROCEDURE — 82570 ASSAY OF URINE CREATININE: CPT | Performed by: NEUROMUSCULOSKELETAL MEDICINE & OMM

## 2019-05-01 PROCEDURE — 87086 URINE CULTURE/COLONY COUNT: CPT

## 2019-05-01 PROCEDURE — 36415 COLL VENOUS BLD VENIPUNCTURE: CPT

## 2019-05-02 LAB — BACTERIA UR CULT: NORMAL

## 2019-05-21 DIAGNOSIS — I70.0 ATHEROSCLEROSIS OF AORTA (HCC): Primary | ICD-10-CM

## 2019-05-21 DIAGNOSIS — I73.9 PERIPHERAL VASCULAR DISEASE, UNSPECIFIED (HCC): ICD-10-CM

## 2019-06-18 ENCOUNTER — HOSPITAL ENCOUNTER (OUTPATIENT)
Dept: NON INVASIVE DIAGNOSTICS | Facility: CLINIC | Age: 74
Discharge: HOME | End: 2019-06-18
Payer: MEDICARE

## 2019-06-18 DIAGNOSIS — I73.9 PERIPHERAL VASCULAR DISEASE, UNSPECIFIED (HCC): ICD-10-CM

## 2019-06-18 DIAGNOSIS — I70.0 ATHEROSCLEROSIS OF AORTA (HCC): ICD-10-CM

## 2019-06-18 PROCEDURE — 93925 LOWER EXTREMITY STUDY: CPT

## 2019-06-18 PROCEDURE — 93978 VASCULAR STUDY: CPT | Performed by: SURGERY

## 2019-06-18 PROCEDURE — 93978 VASCULAR STUDY: CPT

## 2019-06-18 PROCEDURE — 93922 UPR/L XTREMITY ART 2 LEVELS: CPT | Performed by: SURGERY

## 2019-06-18 PROCEDURE — 93925 LOWER EXTREMITY STUDY: CPT | Performed by: SURGERY

## 2019-06-18 PROCEDURE — 93923 UPR/LXTR ART STDY 3+ LVLS: CPT

## 2019-06-26 ENCOUNTER — OFFICE VISIT (OUTPATIENT)
Dept: VASCULAR SURGERY | Facility: CLINIC | Age: 74
End: 2019-06-26
Payer: MEDICARE

## 2019-06-26 VITALS
HEIGHT: 71 IN | HEART RATE: 66 BPM | SYSTOLIC BLOOD PRESSURE: 110 MMHG | TEMPERATURE: 98.1 F | BODY MASS INDEX: 31.08 KG/M2 | DIASTOLIC BLOOD PRESSURE: 70 MMHG | WEIGHT: 222 LBS

## 2019-06-26 DIAGNOSIS — I72.4 ANEURYSM OF POPLITEAL ARTERY (HCC): ICD-10-CM

## 2019-06-26 DIAGNOSIS — E78.5 HYPERLIPIDEMIA, UNSPECIFIED HYPERLIPIDEMIA TYPE: ICD-10-CM

## 2019-06-26 DIAGNOSIS — I65.23 ATHEROSCLEROSIS OF BOTH CAROTID ARTERIES: ICD-10-CM

## 2019-06-26 DIAGNOSIS — I10 ESSENTIAL HYPERTENSION: ICD-10-CM

## 2019-06-26 DIAGNOSIS — I71.4 ABDOMINAL AORTIC ANEURYSM (AAA) WITHOUT RUPTURE (HCC): Primary | ICD-10-CM

## 2019-06-26 PROCEDURE — 99214 OFFICE O/P EST MOD 30 MIN: CPT | Performed by: NURSE PRACTITIONER

## 2019-07-02 ENCOUNTER — OFFICE VISIT (OUTPATIENT)
Dept: PSYCHIATRY | Facility: CLINIC | Age: 74
End: 2019-07-02
Payer: MEDICARE

## 2019-07-02 DIAGNOSIS — F33.40 RECURRENT MAJOR DEPRESSIVE DISORDER, IN REMISSION (HCC): ICD-10-CM

## 2019-07-02 DIAGNOSIS — F41.1 GAD (GENERALIZED ANXIETY DISORDER): ICD-10-CM

## 2019-07-02 PROCEDURE — 99213 OFFICE O/P EST LOW 20 MIN: CPT | Performed by: PSYCHIATRY & NEUROLOGY

## 2019-07-02 RX ORDER — ALPRAZOLAM 0.25 MG/1
0.25 TABLET ORAL
Qty: 90 TABLET | Refills: 1 | Status: SHIPPED | OUTPATIENT
Start: 2019-07-02 | End: 2020-01-06 | Stop reason: SDUPTHER

## 2019-07-02 RX ORDER — DULOXETIN HYDROCHLORIDE 60 MG/1
60 CAPSULE, DELAYED RELEASE ORAL DAILY
Qty: 90 CAPSULE | Refills: 3 | Status: SHIPPED | OUTPATIENT
Start: 2019-07-02 | End: 2020-01-06 | Stop reason: SDUPTHER

## 2019-07-02 NOTE — PSYCH
Patient ID: Kameron Sinha  is a 75 y o  male  HPI ROS Appetite Changes and Sleep: Eating less and has lost some weight  Sleep has a broken pattern     Review Of Systems:     Mood Normal   Thought Content Normal   General Interrupted sleep   Gastrointestinal Normal   Neurological Normal        Laboratory Results: No results found for this or any previous visit  Subjective:    Seen for depression  Other than passing a very small kidney stone, he has not had any other health problems  He reports that in the past few months he has not had much of an appetite and has lost a few lb and his sleep pattern is broken  He attributes that to not being as active as he used to be  His abdominal aorta aneurysm is being watched because of a change in size  Objective:    Seems to be in remission  Mental status:  Appearance calm and cooperative , adequate hygiene and grooming and good eye contact    Mood euthymic   Affect affect was broad   Speech Normal rate and Normal volume   Thought Processes coherent/organized   Hallucinations no hallucinations present    Thought Content no delusional thoughts verbalized   Abnormal Thoughts no suicidal thoughts  and no homicidal thoughts    Orientation A+O x 3   Memory function Not tested   Attention Span concentration intact   Intellect Not Formally Assessed   Insight Insight intact   Judgement judgment was intact   Muscle Strength Muscle strength and tone were normal and Normal gait    Language no difficulty naming common objects and no difficulty repeating a phrase    Pain none       Assessment/Plan:       Diagnoses and all orders for this visit:    DAVEI (generalized anxiety disorder)  -     ALPRAZolam (XANAX) 0 25 mg tablet; Take 1 tablet (0 25 mg total) by mouth daily at bedtime as needed for anxiety    Recurrent major depressive disorder, in remission (HCC)  -     DULoxetine (CYMBALTA) 60 mg delayed release capsule;  Take 1 capsule (60 mg total) by mouth daily            Treatment Recommendations- Risks Benefits    Same medications and RTO in 6 months  Risks, Benefits And Possible Side Effects Of Medications:  Risks, benefits, and possible side effects of medications explained to patient and patient verbalizes understanding

## 2019-07-11 NOTE — PROGRESS NOTES
There are no diagnoses linked to this encounter  Assessment and plan:       1  BPH with lower urinary tract symptoms   - patient's AUA symptom score today is 10 with an overall bother score of 1    - he continues to empty well with a PVR of 35 mL  - he is not on any medications for his prostate or bladder  - patient will return in 1 year for symptom reassessment and PVR  We did discuss discontinuing routine follow-up in pursuing as needed follow-up in 1 year if he remains asymptomatic  2   Prostate cancer screening  -  Patient's PSA remains stable at 3 0  -  Prostate exam remains benign  -  Prostate cancer screening and the AUA guidelines were discussed with the patient today  Due to the patient's age,  Stability of PSA,  And benign prostate exam, prostate cancer screening will be discontinued at this point  Javier Gong PA-C      Chief Complaint     Chief Complaint   Patient presents with    prostate cancer screening    Benign Prostatic Hypertrophy         History of Present Illness     Yazmin Bermudez  is a 75 y o  Male patient known to Dr Talya Quevedo  For history of BPH, renal cyst, and prostate cancer screening  Patient was last seen in June of 2018 where he had denied any changes in his urinary pattern and noted good flow with complete bladder emptying  Patient's PSA remains stable over the last year at 3 0  Patient demonstrates excellent bladder emptying in the office today with a PVR of 35 mL  Laboratory     Lab Results   Component Value Date    CREATININE 1 14 05/01/2019       Lab Results   Component Value Date    PSA 3 0 05/01/2019    PSA 3 0 07/27/2018       Recent Results (from the past 1 hour(s))   POCT Measure PVR    Collection Time: 07/12/19  8:54 AM   Result Value Ref Range    POST-VOID RESIDUAL VOLUME, ML POC 35 mL         Review of Systems     Review of Systems   Constitutional: Negative for chills and fever     Respiratory: Negative for shortness of breath  Cardiovascular: Negative for chest pain  Gastrointestinal: Negative for constipation, diarrhea, nausea and vomiting  Genitourinary: Negative for difficulty urinating, dysuria, enuresis, flank pain, frequency, hematuria and urgency  Urinary Incontinence Screening      Most Recent Value   Urinary Incontinence   Urinary Incontinence? No   Incomplete emptying? Yes [at times]   Urinary frequency? No   Urinary urgency? No   Urinary hesitancy? No   Dysuria (painful difficult urination)? No   Nocturia (waking up to use the bathroom)? No   Straining (having to push to go)? No   Weak stream?  No   Intermittent stream?  No   Post void dribbling? Yes                      Allergies     Allergies   Allergen Reactions    Lorazepam Hyperactivity       Physical Exam     Physical Exam   Constitutional: He is oriented to person, place, and time  He appears well-developed and well-nourished  No distress  HENT:   Head: Normocephalic and atraumatic  Right Ear: External ear normal    Left Ear: External ear normal    Nose: Nose normal    Eyes: Right eye exhibits no discharge  Left eye exhibits no discharge  No scleral icterus  Cardiovascular: Normal rate  Pulmonary/Chest: Effort normal    Genitourinary:   Genitourinary Comments: Prostate exam reveals a 40-50 g prostate  It is smooth, nontender, and without nodules  Musculoskeletal:   Ambulates independently   Neurological: He is alert and oriented to person, place, and time  Skin: Skin is warm and dry  He is not diaphoretic  Psychiatric: He has a normal mood and affect  His behavior is normal  Judgment and thought content normal    Nursing note and vitals reviewed          Vital Signs     Vitals:    07/12/19 0851   BP: 112/66   BP Location: Left arm   Patient Position: Sitting   Cuff Size: Standard   Pulse: 76   Weight: 101 kg (223 lb 3 2 oz)   Height: 5' 11" (1 803 m)         Current Medications       Current Outpatient Medications:    acetaminophen (TYLENOL) 80 mg chewable tablet, Chew 80 mg every 4 (four) hours as needed for mild pain, Disp: , Rfl:     ALPRAZolam (XANAX) 0 25 mg tablet, Take 1 tablet (0 25 mg total) by mouth daily at bedtime as needed for anxiety, Disp: 90 tablet, Rfl: 1    aspirin 81 MG tablet, Take 1 tablet by mouth daily, Disp: , Rfl:     atorvastatin (LIPITOR) 40 mg tablet, Take 40 mg by mouth daily, Disp: , Rfl: 0    BUTALBITAL-ACETAMINOPHEN PO, Take by mouth, Disp: , Rfl:     Cholecalciferol (VITAMIN D3) 80961 units CAPS, Take by mouth, Disp: , Rfl:     DULoxetine (CYMBALTA) 60 mg delayed release capsule, Take 1 capsule (60 mg total) by mouth daily, Disp: 90 capsule, Rfl: 3    labetalol (NORMODYNE) 100 mg tablet, Take 0 5 tablets by mouth 2 (two) times a day, Disp: , Rfl:     omeprazole (PriLOSEC) 40 MG capsule, , Disp: , Rfl: 0      Active Problems     Patient Active Problem List   Diagnosis    [facility]    Renal cyst, acquired    Sleep apnea    [facility]    Carotid atherosclerosis    Hypertension    Aneurysm of popliteal artery (HCC)    Hyperlipidemia         Past Medical History     Past Medical History:   Diagnosis Date    Anxiety     Aortic dissection (HCC)     Depression     Hyperlipidemia     Hypertension     Kidney stone     Renal cyst     Renal cyst     Sleep apnea          Surgical History     Past Surgical History:   Procedure Laterality Date    ABDOMINAL AORTIC ANEURYSM REPAIR      CELIAC ARTERY STENT      KIDNEY STONE SURGERY           Family History     Family History   Problem Relation Age of Onset    Stroke Mother     Prostate cancer Father          Social History     Social History       Radiology

## 2019-07-12 ENCOUNTER — OFFICE VISIT (OUTPATIENT)
Dept: UROLOGY | Facility: CLINIC | Age: 74
End: 2019-07-12
Payer: MEDICARE

## 2019-07-12 VITALS
BODY MASS INDEX: 31.25 KG/M2 | SYSTOLIC BLOOD PRESSURE: 112 MMHG | DIASTOLIC BLOOD PRESSURE: 66 MMHG | WEIGHT: 223.2 LBS | HEIGHT: 71 IN | HEART RATE: 76 BPM

## 2019-07-12 DIAGNOSIS — N40.0 BENIGN PROSTATIC HYPERPLASIA, UNSPECIFIED WHETHER LOWER URINARY TRACT SYMPTOMS PRESENT: Primary | ICD-10-CM

## 2019-07-12 LAB — POST-VOID RESIDUAL VOLUME, ML POC: 35 ML

## 2019-07-12 PROCEDURE — 99213 OFFICE O/P EST LOW 20 MIN: CPT | Performed by: PHYSICIAN ASSISTANT

## 2019-07-12 PROCEDURE — 51798 US URINE CAPACITY MEASURE: CPT | Performed by: PHYSICIAN ASSISTANT

## 2019-10-22 ENCOUNTER — TELEPHONE (OUTPATIENT)
Dept: VASCULAR SURGERY | Facility: CLINIC | Age: 74
End: 2019-10-22

## 2019-10-22 DIAGNOSIS — I65.23 CAROTID STENOSIS, BILATERAL: Primary | ICD-10-CM

## 2019-11-22 ENCOUNTER — HOSPITAL ENCOUNTER (OUTPATIENT)
Dept: NON INVASIVE DIAGNOSTICS | Facility: CLINIC | Age: 74
Discharge: HOME | End: 2019-11-22
Payer: MEDICARE

## 2019-11-22 DIAGNOSIS — I65.23 CAROTID STENOSIS, BILATERAL: ICD-10-CM

## 2019-11-22 PROCEDURE — 93880 EXTRACRANIAL BILAT STUDY: CPT

## 2019-11-24 PROCEDURE — 93880 EXTRACRANIAL BILAT STUDY: CPT | Performed by: SURGERY

## 2019-11-26 ENCOUNTER — OFFICE VISIT (OUTPATIENT)
Dept: VASCULAR SURGERY | Facility: CLINIC | Age: 74
End: 2019-11-26
Payer: MEDICARE

## 2019-11-26 VITALS
TEMPERATURE: 96.7 F | DIASTOLIC BLOOD PRESSURE: 76 MMHG | WEIGHT: 224 LBS | HEIGHT: 71 IN | SYSTOLIC BLOOD PRESSURE: 122 MMHG | HEART RATE: 59 BPM | BODY MASS INDEX: 31.36 KG/M2

## 2019-11-26 DIAGNOSIS — I71.2 THORACIC AORTIC ANEURYSM WITHOUT RUPTURE (HCC): ICD-10-CM

## 2019-11-26 DIAGNOSIS — I71.4 ABDOMINAL AORTIC ANEURYSM (AAA) WITHOUT RUPTURE (HCC): ICD-10-CM

## 2019-11-26 DIAGNOSIS — I65.23 ATHEROSCLEROSIS OF BOTH CAROTID ARTERIES: Primary | ICD-10-CM

## 2019-11-26 DIAGNOSIS — I72.4 ANEURYSM OF POPLITEAL ARTERY (HCC): ICD-10-CM

## 2019-11-26 PROBLEM — I71.20 THORACIC AORTIC ANEURYSM WITHOUT RUPTURE: Status: ACTIVE | Noted: 2019-11-26

## 2019-11-26 PROCEDURE — 99214 OFFICE O/P EST MOD 30 MIN: CPT | Performed by: NURSE PRACTITIONER

## 2019-11-26 NOTE — PATIENT INSTRUCTIONS
History of carotid subclavian bypass  -We reviewed the results of your recent carotid duplex  Your carotid to subclavian bypass is widely patent/open  -Continue all your current medications  Including aspirin and atorvastatin  -We will monitor for any disease progression with a carotid duplex once per year  -You are due for your aortic and lower extremity duplexes  These were scheduled for you today    We will send you the test results in the mail; if the results were significantly changed we would call you for an office visit sooner

## 2019-11-26 NOTE — ASSESSMENT & PLAN NOTE
-LEAD (6/2019)- Bilateral popliteal aneurysm 1 2cm  -Routine surveillance duplex imaging scheduled for Jan 2020

## 2019-11-26 NOTE — ASSESSMENT & PLAN NOTE
-s/p TEVAR with coverage of subclavian, preoperative left carotid subclavian bypass (2011)  -CV duplex results- widely patent carotid subclavian bypass, mild less than 50% carotid stenosis bilaterally, antegrade vertebral on right, oscillating on left  -Asymptomatic  -Continue aspirin and atorvastatin  -Will continue routine monitoring with annual duplex imaging

## 2019-11-26 NOTE — PROGRESS NOTES
Assessment/Plan:    76 y/o M with HTN, HLD, type B aortic dissection s/p left subclavian to carotid bypass prior to TEVAR (2011), and AAA, seen for review after recent carotid duplex  Thoracic aortic aneurysm without rupture ([facility] [address] )  -s/p TEVAR with coverage of subclavian, preoperative left carotid subclavian bypass (2011)  -CV duplex results- widely patent carotid subclavian bypass, mild less than 50% carotid stenosis bilaterally, antegrade vertebral on right, oscillating on left  -Asymptomatic  -Continue aspirin and atorvastatin  -Will continue routine monitoring with annual duplex imaging    Aneurysm of abdominal aorta ([facility] [address] )  -AOIL (6/2019)- AAA max diameter 3 9cm, chronic juxtarenal aortic dissection, unchanged  -Scheduled for routine surveillance duplex imaging in Jan 2020  -Maintain on ASA + statin  -BP well controlled  Continue current medication regimen    Aneurysm of popliteal artery ([facility] [address] )  -LEAD (6/2019)- Bilateral popliteal aneurysm 1 2cm  -Routine surveillance duplex imaging scheduled for Jan 2020       Diagnoses and all orders for this visit:    Atherosclerosis of both carotid arteries  -     VAS carotid complete study; Future    Thoracic aortic aneurysm without rupture (HCC)    Abdominal aortic aneurysm (AAA) without rupture (HCC)    Aneurysm of popliteal artery (HCC)          Subjective:      Patient ID: Madhuri Sánchez  is a 75 y o  male  Patient with thoracic aortic aneurysm s/p TEVAR with coverage of subclavian, preoperative left carotid subclavian bypass (2011)  Had CV duplex done 11/22/19  Patient is asymptomatic from a vascular standpoint  There is oscillating left vertebral flow  He denies any left arm claudication  He denies any dizziness/lightheadedness  No carotid symptoms; denies amaurosis fugax, lateralizing weakness or numbness, slurred speech, facial droop, or visual disturbances  He denies any claudication symptoms of the lower extremities    He is maintained on daily aspirin and statin therapy  He is a nonsmoker  Patient presents in office for yearly surveillance  Patient had CV done on 11/22/2019  Patient reports some pain on the L shoulder area where he has a stent placed x 7 years ago  Patient reports occasional head pain which usually comes on by straining x 2 months  Patient reports occasional B/L pain in finger tips, especially pointer fingers x 1 month  Patient denies all other TIA stroke symptoms  Patient is taking aspirin and atorvastatin  The following portions of the patient's history were reviewed and updated as appropriate: allergies, current medications, past family history, past medical history, past social history, past surgical history and problem list     Review of Systems   Constitutional: Negative  Negative for chills and fever  HENT: Negative  Eyes: Negative  Negative for visual disturbance  Respiratory: Negative  Cardiovascular: Negative  Gastrointestinal: Negative  Negative for abdominal pain  Endocrine: Negative  Genitourinary: Negative  Negative for flank pain  Musculoskeletal: Negative  Negative for back pain  Skin: Negative  Negative for wound  Allergic/Immunologic: Negative  Neurological: Positive for dizziness (if moves quickly), numbness (finger tips) and headaches  Negative for weakness  Hematological: Negative  Psychiatric/Behavioral: Negative  Objective:       Physical Exam   Constitutional: He is oriented to person, place, and time  He appears well-nourished  No distress  HENT:   Head: Normocephalic and atraumatic  Eyes: No scleral icterus  Neck: Neck supple  No JVD present  No carotid bruits   Cardiovascular: Normal rate and regular rhythm  Pulses:       Radial pulses are 2+ on the right side, and 2+ on the left side  Pulmonary/Chest: Effort normal and breath sounds normal    Abdominal: Soft  There is no tenderness  Musculoskeletal: He exhibits no edema     Neurological: He is alert and oriented to person, place, and time  Skin: Skin is warm and dry  Psychiatric: He has a normal mood and affect  I have reviewed and made appropriate changes to the review of systems input by the medical assistant      Vitals:    19 1102 19 1104   BP: 114/72 122/76   BP Location: Right arm Left arm   Patient Position: Sitting Sitting   Cuff Size: Adult Adult   Pulse: 59    Temp: (!) 96 7 °F (35 9 °C)    TempSrc: Tympanic    Weight: 102 kg (224 lb)    Height: 5' 11" (1 803 m)        Patient Active Problem List   Diagnosis    Recurrent major depression in full remission ([facility] [address] )    Renal cyst, acquired    Sleep apnea    Aneurysm of abdominal aorta ([facility] [address] )    Carotid atherosclerosis    Hypertension    Aneurysm of popliteal artery (HCC)    Hyperlipidemia    Thoracic aortic aneurysm without rupture (HCC)       Past Surgical History:   Procedure Laterality Date    ABDOMINAL AORTIC ANEURYSM REPAIR      CELIAC ARTERY STENT      KIDNEY STONE SURGERY         Family History   Problem Relation Age of Onset    Stroke Mother     Prostate cancer Father        Social History     Socioeconomic History    Marital status: /Civil Union     Spouse name: Not on file    Number of children: Not on file    Years of education: Not on file    Highest education level: Not on file   Occupational History    Not on file   Social Needs    Financial resource strain: Not on file    Food insecurity:     Worry: Not on file     Inability: Not on file    Transportation needs:     Medical: Not on file     Non-medical: Not on file   Tobacco Use    Smoking status: Former Smoker     Packs/day: 1 00     Years: 24 00     Pack years: 24 00     Types: Cigarettes     Start date: 1959     Last attempt to quit: 1983     Years since quittin 6    Smokeless tobacco: Never Used   Substance and Sexual Activity    Alcohol use: No    Drug use: No    Sexual activity: Never   Lifestyle    Physical activity:     Days per week: Not on file     Minutes per session: Not on file    Stress: Not on file   Relationships    Social connections:     Talks on phone: Not on file     Gets together: Not on file     Attends Christian service: Not on file     Active member of club or organization: Not on file     Attends meetings of clubs or organizations: Not on file     Relationship status: Not on file    Intimate partner violence:     Fear of current or ex partner: Not on file     Emotionally abused: Not on file     Physically abused: Not on file     Forced sexual activity: Not on file   Other Topics Concern    Not on file   Social History Narrative    Not on file       Allergies   Allergen Reactions    Lorazepam Hyperactivity         Current Outpatient Medications:     acetaminophen (TYLENOL) 80 mg chewable tablet, Chew 80 mg every 4 (four) hours as needed for mild pain, Disp: , Rfl:     ALPRAZolam (XANAX) 0 25 mg tablet, Take 1 tablet (0 25 mg total) by mouth daily at bedtime as needed for anxiety, Disp: 90 tablet, Rfl: 1    aspirin 81 MG tablet, Take 1 tablet by mouth daily, Disp: , Rfl:     atorvastatin (LIPITOR) 40 mg tablet, Take 40 mg by mouth daily, Disp: , Rfl: 0    Cholecalciferol (VITAMIN D3) 45982 units CAPS, Take by mouth, Disp: , Rfl:     DULoxetine (CYMBALTA) 60 mg delayed release capsule, Take 1 capsule (60 mg total) by mouth daily, Disp: 90 capsule, Rfl: 3    labetalol (NORMODYNE) 100 mg tablet, Take 0 5 tablets by mouth 2 (two) times a day, Disp: , Rfl:     omeprazole (PriLOSEC) 40 MG capsule, , Disp: , Rfl: 0    BUTALBITAL-ACETAMINOPHEN PO, Take by mouth, Disp: , Rfl:

## 2019-11-26 NOTE — ASSESSMENT & PLAN NOTE
-AOIL (6/2019)- AAA max diameter 3 9cm, chronic juxtarenal aortic dissection, unchanged  -Scheduled for routine surveillance duplex imaging in Jan 2020  -Maintain on ASA + statin  -BP well controlled    Continue current medication regimen

## 2020-01-06 ENCOUNTER — OFFICE VISIT (OUTPATIENT)
Dept: PSYCHIATRY | Facility: CLINIC | Age: 75
End: 2020-01-06
Payer: MEDICARE

## 2020-01-06 DIAGNOSIS — F41.1 GAD (GENERALIZED ANXIETY DISORDER): ICD-10-CM

## 2020-01-06 DIAGNOSIS — F33.40 RECURRENT MAJOR DEPRESSIVE DISORDER, IN REMISSION (HCC): ICD-10-CM

## 2020-01-06 PROCEDURE — 99213 OFFICE O/P EST LOW 20 MIN: CPT | Performed by: PSYCHIATRY & NEUROLOGY

## 2020-01-06 RX ORDER — DULOXETIN HYDROCHLORIDE 60 MG/1
60 CAPSULE, DELAYED RELEASE ORAL DAILY
Refills: 3
Start: 2020-01-06 | End: 2020-09-10

## 2020-01-06 RX ORDER — ALPRAZOLAM 0.25 MG/1
0.25 TABLET ORAL
Qty: 90 TABLET | Refills: 1 | Status: SHIPPED | OUTPATIENT
Start: 2020-01-06 | End: 2020-07-09 | Stop reason: SDUPTHER

## 2020-01-06 NOTE — PSYCH
Patient ID: Kaitlin Cárdenas  is a 75 y o  male  Review Of Systems:     Mood Euthymic   Thought Content Normal   General As HPI   Physical symptoms No physical complaints       Laboratory Results: No results found for this or any previous visit  Subjective: The patient was seen for anxiety and depression  Since last visit, he has remained in remission  No problems with sleep or appetite and he has not had any new health problems  Objective:     Remains in remission  Mental status:  Appearance calm and cooperative , adequate hygiene and grooming and good eye contact    Mood euthymic   Affect affect was broad   Speech Normal rate and Normal volume   Thought Processes coherent/organized   Hallucinations no hallucinations present    Thought Content no delusional thoughts verbalized   Abnormal Thoughts no suicidal thoughts  and no homicidal thoughts    Orientation A+O x 3       Assessment/Plan:       Diagnoses and all orders for this visit:    DAVIE (generalized anxiety disorder)  -     ALPRAZolam (XANAX) 0 25 mg tablet; Take 1 tablet (0 25 mg total) by mouth daily at bedtime as needed for anxiety    Recurrent major depressive disorder, in remission ([facility])  -     DULoxetine (CYMBALTA) 60 mg delayed release capsule; Take 1 capsule (60 mg total) by mouth daily        1  DAVIE (generalized anxiety disorder)    2   Recurrent major depressive disorder, in remission [facility]        Treatment Recommendations- Risks Benefits      Risks, Benefits And Possible Side Effects Of Medications:  Risks, benefits, and possible side effects of medications explained to patient and patient verbalizes understanding

## 2020-01-13 ENCOUNTER — HOSPITAL ENCOUNTER (OUTPATIENT)
Dept: NON INVASIVE DIAGNOSTICS | Facility: CLINIC | Age: 75
Discharge: HOME | End: 2020-01-13
Payer: MEDICARE

## 2020-01-13 DIAGNOSIS — I65.23 ATHEROSCLEROSIS OF BOTH CAROTID ARTERIES: ICD-10-CM

## 2020-01-13 DIAGNOSIS — I71.4 ABDOMINAL AORTIC ANEURYSM (AAA) WITHOUT RUPTURE (HCC): ICD-10-CM

## 2020-01-13 DIAGNOSIS — I72.4 ANEURYSM OF POPLITEAL ARTERY (HCC): ICD-10-CM

## 2020-01-13 PROCEDURE — 93923 UPR/LXTR ART STDY 3+ LVLS: CPT

## 2020-01-13 PROCEDURE — 93978 VASCULAR STUDY: CPT

## 2020-01-13 PROCEDURE — 93925 LOWER EXTREMITY STUDY: CPT

## 2020-01-13 PROCEDURE — 93925 LOWER EXTREMITY STUDY: CPT | Performed by: SURGERY

## 2020-01-13 PROCEDURE — 93978 VASCULAR STUDY: CPT | Performed by: SURGERY

## 2020-01-13 PROCEDURE — 93922 UPR/L XTREMITY ART 2 LEVELS: CPT | Performed by: SURGERY

## 2020-02-03 ENCOUNTER — OFFICE VISIT (OUTPATIENT)
Dept: PULMONOLOGY | Facility: CLINIC | Age: 75
End: 2020-02-03
Payer: MEDICARE

## 2020-02-03 VITALS
OXYGEN SATURATION: 95 % | DIASTOLIC BLOOD PRESSURE: 68 MMHG | HEIGHT: 71 IN | BODY MASS INDEX: 31.22 KG/M2 | TEMPERATURE: 97.6 F | HEART RATE: 73 BPM | WEIGHT: 223 LBS | SYSTOLIC BLOOD PRESSURE: 122 MMHG

## 2020-02-03 DIAGNOSIS — G47.33 OSA (OBSTRUCTIVE SLEEP APNEA): Primary | ICD-10-CM

## 2020-02-03 DIAGNOSIS — I10 ESSENTIAL HYPERTENSION: ICD-10-CM

## 2020-02-03 DIAGNOSIS — E78.5 DYSLIPIDEMIA: ICD-10-CM

## 2020-02-03 DIAGNOSIS — E66.09 CLASS 1 OBESITY DUE TO EXCESS CALORIES WITHOUT SERIOUS COMORBIDITY WITH BODY MASS INDEX (BMI) OF 31.0 TO 31.9 IN ADULT: ICD-10-CM

## 2020-02-03 PROCEDURE — 99213 OFFICE O/P EST LOW 20 MIN: CPT | Performed by: INTERNAL MEDICINE

## 2020-02-03 NOTE — PROGRESS NOTES
Office Progress Note - Pulmonary    Davenport Emerita  75 y o  male MRN: [id number]    Encounter: [id number]      Assessment:   Obstructive sleep apnea   Hypertension   Dyslipidemia   Obesity  Plan:     Nocturnal CPAP   Follow-up in 1 year  Discussion:   The patient's obstructive sleep apnea is well treated  He is compliant with the use every night  We will call [facility] and verify when was the last time he had a CPAP machine  If his machine is very old we will order a new 1  The patient could not remember if he had the new 1 I have ordered back in April of 2018  His blood pressure  is well controlled  I will see him in 1 year in a follow-up visit  Subjective: The patient is here for a follow-up visit  He is compliant with the nocturnal CPAP  He is using it every night  He denies daytime hypersomnolence  Back in April of 2018 which was the last visit to the office, I have ordered a new CPAP machine  The patient does not remember if he has picked it up or he still has his old 1  He denies shortness of breath  Denies cough, wheezing or sputum production  He has no nocturnal symptoms  Review of systems:  A 12 point system review is done and aside from what is stated above the rest of the review of systems is negative  Family history and social history are reviewed  Medications list is reviewed  Vitals: Blood pressure 122/68, pulse 73, temperature 97 6 °F (36 4 °C), temperature source Tympanic, height 5' 11" (1 803 m), weight 101 kg (223 lb), SpO2 95 %  ,     Physical Exam  Gen: Awake, alert, oriented x 3, no acute distress  HEENT: Mucous membranes moist, no oral lesions, no thrush  NECK: No accessory muscle use, JVP not elevated  Cardiac: Regular, single S1, single S2, no murmurs, no rubs, no gallops  Lungs:  Few basal crackles  No wheezing    Abdomen: normoactive bowel sounds, soft nontender, nondistended, no rebound or rigidity, no guarding  Extremities: no cyanosis, no clubbing, no edema  Neuro:  Grossly nonfocal   Skin:  No rash      Lab Results   Component Value Date    SODIUM 141 05/01/2019    K 4 2 05/01/2019     05/01/2019    CO2 28 05/01/2019    AGAP 7 05/01/2019    BUN 21 05/01/2019    CREATININE 1 14 05/01/2019    GLUC 139 03/24/2019    GLUF 112 (H) 05/01/2019    CALCIUM 9 2 05/01/2019    AST 28 05/01/2019    ALT 30 05/01/2019    ALKPHOS 113 05/01/2019    TP 7 0 05/01/2019    TBILI 0 80 05/01/2019    EGFR 63 05/01/2019

## 2020-02-13 ENCOUNTER — TELEPHONE (OUTPATIENT)
Dept: PULMONOLOGY | Facility: CLINIC | Age: 75
End: 2020-02-13

## 2020-07-09 ENCOUNTER — OFFICE VISIT (OUTPATIENT)
Dept: PSYCHIATRY | Facility: CLINIC | Age: 75
End: 2020-07-09
Payer: MEDICARE

## 2020-07-09 DIAGNOSIS — F41.1 GAD (GENERALIZED ANXIETY DISORDER): ICD-10-CM

## 2020-07-09 PROCEDURE — 99213 OFFICE O/P EST LOW 20 MIN: CPT | Performed by: PSYCHIATRY & NEUROLOGY

## 2020-07-09 RX ORDER — ALPRAZOLAM 0.25 MG/1
0.25 TABLET ORAL
Qty: 90 TABLET | Refills: 1 | Status: SHIPPED | OUTPATIENT
Start: 2020-07-09 | End: 2021-01-19 | Stop reason: SDUPTHER

## 2020-07-09 NOTE — PSYCH
Patient ID: Aaron Hall  is a 67 y o  male  Review Of Systems:     Mood Euthymic   Thought Content Normal   General No issues   Physical symptoms No physical complaints       Laboratory Results: No results found for this or any previous visit  Subjective:    Seen for follow up  No new health problems  Sleeping and eating well  His sister in law with history of alcoholism recently  No side effects with medications      Objective:     Stable and in remission  Mental status:  Appearance calm and cooperative , adequate hygiene and grooming and good eye contact    Mood euthymic   Affect affect was broad   Speech Normal rate and Normal volume   Thought Processes coherent/organized   Hallucinations no hallucinations present    Thought Content no delusional thoughts verbalized   Abnormal Thoughts no suicidal thoughts  and no homicidal thoughts    Orientation A+O x 3       Assessment/Plan:       There are no diagnoses linked to this encounter  No diagnosis found      Treatment Recommendations- Risks Benefits      Risks, Benefits And Possible Side Effects Of Medications:  Risks, benefits, and possible side effects of medications explained to patient and patient verbalizes understanding

## 2020-07-31 ENCOUNTER — OFFICE VISIT (OUTPATIENT)
Dept: UROLOGY | Facility: CLINIC | Age: 75
End: 2020-07-31
Payer: MEDICARE

## 2020-07-31 VITALS
WEIGHT: 221 LBS | HEIGHT: 71 IN | HEART RATE: 68 BPM | BODY MASS INDEX: 30.94 KG/M2 | DIASTOLIC BLOOD PRESSURE: 70 MMHG | TEMPERATURE: 97.3 F | SYSTOLIC BLOOD PRESSURE: 120 MMHG

## 2020-07-31 DIAGNOSIS — N40.0 BENIGN PROSTATIC HYPERPLASIA, UNSPECIFIED WHETHER LOWER URINARY TRACT SYMPTOMS PRESENT: Primary | ICD-10-CM

## 2020-07-31 LAB — POST-VOID RESIDUAL VOLUME, ML POC: 35 ML

## 2020-07-31 PROCEDURE — 99213 OFFICE O/P EST LOW 20 MIN: CPT | Performed by: PHYSICIAN ASSISTANT

## 2020-07-31 PROCEDURE — 51798 US URINE CAPACITY MEASURE: CPT | Performed by: PHYSICIAN ASSISTANT

## 2020-07-31 RX ORDER — LABETALOL 200 MG/1
100 TABLET, FILM COATED ORAL 2 TIMES DAILY
COMMUNITY
Start: 2020-06-15

## 2020-07-31 NOTE — PROGRESS NOTES
1  Benign prostatic hyperplasia, unspecified whether lower urinary tract symptoms present  POCT Measure PVR         Assessment and plan:       1  BPH with lower urinary tract symptoms   - patient is demonstrating adequate bladder emptying in the office today  Urinary symptoms remain stable  He is off of all medications for his prostate or bladder at this time  Patient will follow with Urology on as-needed basis, however encouraged to contact us in the future with any urinary concerns  Patient verbalized understanding  All questions answered  2   Prostate cancer screening  -  routine prostate cancer screening has been safely discontinued as per patient's age, previous PSA stability, and AUA guidelines  Thomas Kaufman PA-C      Chief Complaint     Follow-up urinary symptoms    History of Present Illness     Floyd Seen  is a 67 y o  Male patient known to Dr aBmbi Fry  For history of BPH, renal cyst, and prostate cancer screening  Patient is currently not on any medications for his prostate or bladder  Patient remains overall happy with his urination  He has occasional urinary frequency and urgency, however this is typically dietary precipitated  Denies any dysuria, gross hematuria, or urinary infections  Has nocturia 0-1 time nightly depending on his evening fluid consumption  Patient's last PSA was 3 0 (05/01/2019)  Routine prostate cancer screening has been discontinued as per AUA guidelines  Postvoid residual 35 mL  Urinary Incontinence Screening      Most Recent Value   Urinary Incontinence   Urinary Incontinence? Yes   Incomplete emptying? No   Urinary frequency? Yes   Urinary urgency? Yes [sometimes]   Urinary hesitancy? No   Dysuria (painful difficult urination)? No   Nocturia (waking up to use the bathroom)? No   Straining (having to push to go)? No   Weak stream?  No   Intermittent stream?  Yes [sometimes]   Post void dribbling?   Yes Laboratory     Lab Results   Component Value Date    CREATININE 1 14 05/01/2019       Lab Results   Component Value Date    PSA 3 0 05/01/2019    PSA 3 0 07/27/2018       No results found for this or any previous visit (from the past 1 hour(s))  Review of Systems     Review of Systems   Constitutional: Negative for chills and fever  Respiratory: Negative for shortness of breath  Cardiovascular: Negative for chest pain  Gastrointestinal: Negative for constipation, diarrhea, nausea and vomiting  Genitourinary: Negative for difficulty urinating, dysuria, enuresis, flank pain, frequency, hematuria and urgency  Urinary Incontinence Screening      Most Recent Value   Urinary Incontinence   Urinary Incontinence? No   Incomplete emptying? Yes [at times]   Urinary frequency? No   Urinary urgency? No   Urinary hesitancy? No   Dysuria (painful difficult urination)? No   Nocturia (waking up to use the bathroom)? No   Straining (having to push to go)? No   Weak stream?  No   Intermittent stream?  No   Post void dribbling? Yes                      Allergies     Allergies   Allergen Reactions    Lorazepam Hyperactivity       Physical Exam     Physical Exam   Constitutional: He is oriented to person, place, and time  He appears well-developed and well-nourished  No distress  HENT:   Head: Normocephalic and atraumatic  Right Ear: External ear normal    Left Ear: External ear normal    Nose: Nose normal    Eyes: Right eye exhibits no discharge  Left eye exhibits no discharge  No scleral icterus  Cardiovascular: Normal rate  Pulmonary/Chest: Effort normal    Musculoskeletal:   Ambulates independently   Neurological: He is alert and oriented to person, place, and time  Skin: Skin is warm and dry  He is not diaphoretic  Psychiatric: He has a normal mood and affect  His behavior is normal  Judgment and thought content normal    Nursing note and vitals reviewed          Vital Signs Vitals:    07/31/20 0923   BP: 120/70   Pulse: 68   Temp: (!) 97 3 °F (36 3 °C)   TempSrc: Temporal   Weight: 100 kg (221 lb)   Height: 5' 11" (1 803 m)         Current Medications       Current Outpatient Medications:     acetaminophen (TYLENOL) 80 mg chewable tablet, Chew 80 mg every 4 (four) hours as needed for mild pain, Disp: , Rfl:     ALPRAZolam (XANAX) 0 25 mg tablet, Take 1 tablet (0 25 mg total) by mouth daily at bedtime as needed for anxiety, Disp: 90 tablet, Rfl: 1    aspirin 81 MG tablet, Take 1 tablet by mouth daily, Disp: , Rfl:     atorvastatin (LIPITOR) 40 mg tablet, Take 40 mg by mouth daily, Disp: , Rfl: 0    BUTALBITAL-ACETAMINOPHEN PO, Take by mouth, Disp: , Rfl:     Cholecalciferol (VITAMIN D3) 38054 units CAPS, Take by mouth, Disp: , Rfl:     DULoxetine (CYMBALTA) 60 mg delayed release capsule, Take 1 capsule (60 mg total) by mouth daily, Disp: , Rfl: 3    labetalol (NORMODYNE) 100 mg tablet, Take 0 5 tablets by mouth 2 (two) times a day, Disp: , Rfl:     labetalol (NORMODYNE) 200 mg tablet, Take 100 mg by mouth 2 (two) times a day, Disp: , Rfl:     omeprazole (PriLOSEC) 40 MG capsule, , Disp: , Rfl: 0      Active Problems     Patient Active Problem List   Diagnosis    [facility]    Renal cyst, acquired    Sleep apnea    [facility]    Carotid atherosclerosis    Hypertension    Aneurysm of popliteal artery (HCC)    Hyperlipidemia    Thoracic aortic aneurysm without rupture [facility])         Past Medical History     Past Medical History:   Diagnosis Date    Anxiety     Aortic dissection (HCC)     Depression     Hyperlipidemia     Hypertension     Kidney stone     Renal cyst     Renal cyst     Sleep apnea     Sleep apnea, obstructive          Surgical History     Past Surgical History:   Procedure Laterality Date    ABDOMINAL AORTIC ANEURYSM REPAIR      CELIAC ARTERY STENT      KIDNEY STONE SURGERY Family History     Family History   Problem Relation Age of Onset   [facility] Stroke Mother     Prostate cancer Father          Social History     Social History       Radiology

## 2020-09-10 DIAGNOSIS — F33.40 RECURRENT MAJOR DEPRESSIVE DISORDER, IN REMISSION (HCC): ICD-10-CM

## 2020-09-10 RX ORDER — DULOXETIN HYDROCHLORIDE 60 MG/1
CAPSULE, DELAYED RELEASE ORAL
Qty: 90 CAPSULE | Refills: 1 | Status: SHIPPED | OUTPATIENT
Start: 2020-09-10 | End: 2021-01-19 | Stop reason: SDUPTHER

## 2020-11-30 ENCOUNTER — HOSPITAL ENCOUNTER (OUTPATIENT)
Dept: NON INVASIVE DIAGNOSTICS | Facility: CLINIC | Age: 75
Discharge: HOME | End: 2020-11-30
Payer: MEDICARE

## 2020-11-30 DIAGNOSIS — I71.4 ABDOMINAL AORTIC ANEURYSM (AAA) WITHOUT RUPTURE (HCC): Primary | ICD-10-CM

## 2020-11-30 DIAGNOSIS — I65.23 ATHEROSCLEROSIS OF BOTH CAROTID ARTERIES: ICD-10-CM

## 2020-11-30 DIAGNOSIS — I72.4 ANEURYSM OF POPLITEAL ARTERY (HCC): ICD-10-CM

## 2020-11-30 PROCEDURE — 93880 EXTRACRANIAL BILAT STUDY: CPT

## 2020-11-30 PROCEDURE — 93880 EXTRACRANIAL BILAT STUDY: CPT | Performed by: SURGERY

## 2021-01-19 ENCOUNTER — OFFICE VISIT (OUTPATIENT)
Dept: PSYCHIATRY | Facility: CLINIC | Age: 76
End: 2021-01-19
Payer: MEDICARE

## 2021-01-19 DIAGNOSIS — F41.1 GAD (GENERALIZED ANXIETY DISORDER): ICD-10-CM

## 2021-01-19 DIAGNOSIS — F33.40 RECURRENT MAJOR DEPRESSIVE DISORDER, IN REMISSION (HCC): ICD-10-CM

## 2021-01-19 PROCEDURE — 99214 OFFICE O/P EST MOD 30 MIN: CPT | Performed by: NURSE PRACTITIONER

## 2021-01-19 RX ORDER — DULOXETIN HYDROCHLORIDE 60 MG/1
60 CAPSULE, DELAYED RELEASE ORAL DAILY
Qty: 90 CAPSULE | Refills: 3 | Status: SHIPPED | OUTPATIENT
Start: 2021-01-19 | End: 2021-07-19 | Stop reason: SDUPTHER

## 2021-01-19 RX ORDER — ALPRAZOLAM 0.25 MG/1
0.25 TABLET ORAL
Qty: 90 TABLET | Refills: 1 | Status: SHIPPED | OUTPATIENT
Start: 2021-01-19 | End: 2021-07-12

## 2021-01-19 NOTE — PSYCH
TREATMENT PLAN (Medication Management Only)        [facility]     Name and Date of Birth:  Karolyn Trevino  67 y o  1945  Date of Treatment Plan: January 19, 2021  Diagnosis/Diagnoses:    1  DAVIE (generalized anxiety disorder)    2  Recurrent major depressive disorder, in remission [facility])      Strengths/Personal Resources for Self-Care: supportive family, supportive friends  Area/Areas of need (in own words): "I am doing well "  1  Long Term Goal: "To continue to stay well and healthy "  Target Date: 6 months - 7/19/2021  Person/Persons responsible for completion of goal: Aaron  2  Short Term Objective (s) - How will we reach this goal?:   A  Provider new recommended medication/dosage changes and/or continue medication(s): continue current medications as prescribed  B   N/A   C   N/A  Target Date: 6 months - 7/19/2021  Person/Persons Responsible for Completion of Goal: Aaron  Progress Towards Goals: stable  Treatment Modality: medication education at every visit  Review due 6 months from date of this plan: 6 months - 7/19/2021  Expected length of service: over 1 year  My Physician/PA/NP and I have developed this plan together and I agree to work on the goals and objectives  I understand the treatment goals that were developed for my treatment

## 2021-01-19 NOTE — PSYCH
PROGRESS NOTE        Sebastián Mclain      Name and Date of Birth:  Bronwyn Bailon  67 y o  1945    Date of Visit: 01/19/21    SUBJECTIVE :  Patient was seen for treatment of major depressive disorder and generalized anxiety disorder  On examination today, the patient is reporting that he is doing very well on his current dose of medication  He takes Cymbalta 60 mg daily and alprazolam 0 25 mg which he takes at bedtime  He reports feeling happy and content  He is sleeping well, he is eating well and he is enjoying life  He and his wife are staying as safe as they can during the coronavirus crisis  He remains very pleasant has a very good outlook on life  He will continue his current medication regimen and follow-up with me will be in 6 months  He denies suicidal ideation, intent or plan at present, has no suicidal ideation, intent or plan at present  He denies any auditory hallucinations and visual hallucinations, denies any other delusional thinking, denies any delusional thinking  He denies any side effects from medications    HPI ROS Appetite Changes and Sleep: normal appetite, normal sleep    Review Of Systems:      Constitutional Negative   ENT Negative   Cardiovascular Negative   Respiratory As Noted in HPI   Gastrointestinal Negative   Genitourinary Negative   Musculoskeletal Negative   Integumentary Negative   Neurological Negative   Endocrine Negative   Other Symptoms Negative and None       Laboratory Results: No results found for this or any previous visit      Substance Abuse History:    Social History     Substance and Sexual Activity   Drug Use No       Family Psychiatric History:     Family History   Problem Relation Age of Onset    Stroke Mother     Prostate cancer Father        The following portions of the patient's history were reviewed and updated as appropriate: past family history, past medical history, past social history, past surgical history and problem list     Social History     Socioeconomic History    Marital status: /Civil Union     Spouse name: Not on file    Number of children: Not on file    Years of education: Not on file    Highest education level: Not on file   Occupational History    Not on file   Social Needs    Financial resource strain: Not on file    Food insecurity     Worry: Not on file     Inability: Not on file    Transportation needs     Medical: Not on file     Non-medical: Not on file   Tobacco Use    Smoking status: Former Smoker     Packs/day: 1 00     Years: 24 00     Pack years: 24 00     Types: Cigarettes     Start date: 1959     Quit date: 1983     Years since quittin 7    Smokeless tobacco: Never Used   Substance and Sexual Activity    Alcohol use: No    Drug use: No    Sexual activity: Never   Lifestyle    Physical activity     Days per week: Not on file     Minutes per session: Not on file    Stress: Not on file   Relationships    Social connections     Talks on phone: Not on file     Gets together: Not on file     Attends Mosque service: Not on file     Active member of club or organization: Not on file     Attends meetings of clubs or organizations: Not on file     Relationship status: Not on file    Intimate partner violence     Fear of current or ex partner: Not on file     Emotionally abused: Not on file     Physically abused: Not on file     Forced sexual activity: Not on file   Other Topics Concern    Not on file   Social History Narrative    Not on file     Social History     Social History Narrative    Not on file        Social History     Tobacco History     Smoking Status  Former Smoker Smoking Start Date  1959 Quit date  1983 Smoking Frequency  1 pack/day for 24 years (25 pk yrs)    Smoking Tobacco Type  Cigarettes    Smokeless Tobacco Use  Never Used          Alcohol History     Alcohol Use Status  No          Drug Use Drug Use Status  No          Sexual Activity     Sexually Active  Never          Activities of Daily Living    Not Asked                     OBJECTIVE:     Mental Status Evaluation:    Appearance age appropriate, casually dressed   Behavior pleasant, cooperative   Speech normal volume, normal pitch   Mood Stable   Affect Br ight   Thought Processes logical   Associations intact associations   Thought Content Normal   Perceptual Disturbances: None   Abnormal Thoughts  Risk Potential Suicidal ideation - None  Homicidal ideation - None  Potential for aggression - No   Orientation oriented to person, place, time/date and situation   Memory recent and remote memory grossly intact   Cosciousness alert and awake   Attention Span attention span and concentration are age appropriate   Intellect Appears to be of Average Intelligence   Insight Good   Judgement Good   Muscle Strength and  Gait muscle strength and tone were normal   Language no difficulty naming common objects   Fund of Knowledge displays adequate knowledge of current events   Pain None   Pain Scale 0       Assessment/Plan:       Diagnoses and all orders for this visit:    DAVIE (generalized anxiety disorder)  -     ALPRAZolam (XANAX) 0 25 mg tablet; Take 1 tablet (0 25 mg total) by mouth daily at bedtime as needed for anxiety    Recurrent major depressive disorder, in remission (HCC)  -     DULoxetine (CYMBALTA) 60 mg delayed release capsule; Take 1 capsule (60 mg total) by mouth daily          Treatment Recommendations/Precautions:    Continue current medications:  Cymbalta 60 mg daily  Xanax 0 25 mg HS p r n     Follow-up is in 6 months or sooner if necessary  Risks/Benefits      Risks, Benefits And Possible Side Effects Of Medications:    Risks, benefits, and possible side effects of medications explained to patient and patient verbalizes understanding and agreement for treatment      Controlled Medication Discussion:  No history of any overuse or abuse of controlled substances    I    Not applicable    Psychotherapy Provided:     Individual psychotherapy provided: No

## 2021-01-21 DIAGNOSIS — K21.00 GASTROESOPHAGEAL REFLUX DISEASE WITH ESOPHAGITIS WITHOUT HEMORRHAGE: Primary | ICD-10-CM

## 2021-01-21 RX ORDER — OMEPRAZOLE 40 MG/1
CAPSULE, DELAYED RELEASE ORAL
Qty: 30 CAPSULE | Refills: 1 | Status: SHIPPED | OUTPATIENT
Start: 2021-01-21 | End: 2021-03-22

## 2021-01-22 ENCOUNTER — IMMUNIZATIONS (OUTPATIENT)
Dept: FAMILY MEDICINE CLINIC | Facility: HOSPITAL | Age: 76
End: 2021-01-22

## 2021-01-22 DIAGNOSIS — Z23 ENCOUNTER FOR IMMUNIZATION: Primary | ICD-10-CM

## 2021-01-22 PROCEDURE — 91300 SARS-COV-2 / COVID-19 MRNA VACCINE (PFIZER-BIONTECH) 30 MCG: CPT

## 2021-01-22 PROCEDURE — 0001A SARS-COV-2 / COVID-19 MRNA VACCINE (PFIZER-BIONTECH) 30 MCG: CPT

## 2021-01-27 ENCOUNTER — LAB (OUTPATIENT)
Dept: LAB | Facility: CLINIC | Age: 76
End: 2021-01-27
Payer: MEDICARE

## 2021-01-27 ENCOUNTER — TRANSCRIBE ORDERS (OUTPATIENT)
Dept: LAB | Facility: CLINIC | Age: 76
End: 2021-01-27

## 2021-01-27 DIAGNOSIS — K76.0 FATTY METAMORPHOSIS OF LIVER: ICD-10-CM

## 2021-01-27 DIAGNOSIS — Z86.711 PERSONAL HISTORY OF PE (PULMONARY EMBOLISM): ICD-10-CM

## 2021-01-27 DIAGNOSIS — Z86.711 PERSONAL HISTORY OF PE (PULMONARY EMBOLISM): Primary | ICD-10-CM

## 2021-01-27 LAB
BASOPHILS # BLD AUTO: 0.06 THOUSANDS/ΜL (ref 0–0.1)
BASOPHILS NFR BLD AUTO: 1 % (ref 0–1)
D DIMER PPP FEU-MCNC: 2.64 UG/ML FEU
EOSINOPHIL # BLD AUTO: 0.18 THOUSAND/ΜL (ref 0–0.61)
EOSINOPHIL NFR BLD AUTO: 3 % (ref 0–6)
ERYTHROCYTE [DISTWIDTH] IN BLOOD BY AUTOMATED COUNT: 12.5 % (ref 11.6–15.1)
HCT VFR BLD AUTO: 43.1 % (ref 36.5–49.3)
HGB BLD-MCNC: 14.2 G/DL (ref 12–17)
IMM GRANULOCYTES # BLD AUTO: 0.02 THOUSAND/UL (ref 0–0.2)
IMM GRANULOCYTES NFR BLD AUTO: 0 % (ref 0–2)
LYMPHOCYTES # BLD AUTO: 1.64 THOUSANDS/ΜL (ref 0.6–4.47)
LYMPHOCYTES NFR BLD AUTO: 24 % (ref 14–44)
MCH RBC QN AUTO: 31.6 PG (ref 26.8–34.3)
MCHC RBC AUTO-ENTMCNC: 32.9 G/DL (ref 31.4–37.4)
MCV RBC AUTO: 96 FL (ref 82–98)
MONOCYTES # BLD AUTO: 0.68 THOUSAND/ΜL (ref 0.17–1.22)
MONOCYTES NFR BLD AUTO: 10 % (ref 4–12)
NEUTROPHILS # BLD AUTO: 4.25 THOUSANDS/ΜL (ref 1.85–7.62)
NEUTS SEG NFR BLD AUTO: 62 % (ref 43–75)
NRBC BLD AUTO-RTO: 0 /100 WBCS
PLATELET # BLD AUTO: 211 THOUSANDS/UL (ref 149–390)
PMV BLD AUTO: 8.7 FL (ref 8.9–12.7)
PROCALCITONIN SERPL-MCNC: <0.05 NG/ML
RBC # BLD AUTO: 4.49 MILLION/UL (ref 3.88–5.62)
WBC # BLD AUTO: 6.83 THOUSAND/UL (ref 4.31–10.16)

## 2021-01-27 PROCEDURE — 36415 COLL VENOUS BLD VENIPUNCTURE: CPT

## 2021-01-27 PROCEDURE — 84145 PROCALCITONIN (PCT): CPT

## 2021-01-27 PROCEDURE — 85379 FIBRIN DEGRADATION QUANT: CPT

## 2021-01-27 PROCEDURE — 85025 COMPLETE CBC W/AUTO DIFF WBC: CPT

## 2021-01-28 ENCOUNTER — TRANSCRIBE ORDERS (OUTPATIENT)
Dept: ADMINISTRATIVE | Facility: HOSPITAL | Age: 76
End: 2021-01-28

## 2021-01-28 ENCOUNTER — HOSPITAL ENCOUNTER (OUTPATIENT)
Dept: RADIOLOGY | Facility: HOSPITAL | Age: 76
Discharge: HOME | End: 2021-01-28
Payer: MEDICARE

## 2021-01-28 ENCOUNTER — HOSPITAL ENCOUNTER (OUTPATIENT)
Dept: VASCULAR ULTRASOUND | Facility: HOSPITAL | Age: 76
Discharge: HOME | End: 2021-01-28
Payer: MEDICARE

## 2021-01-28 ENCOUNTER — HOSPITAL ENCOUNTER (OUTPATIENT)
Dept: NUCLEAR MEDICINE | Facility: HOSPITAL | Age: 76
Discharge: HOME | End: 2021-01-28
Payer: MEDICARE

## 2021-01-28 DIAGNOSIS — R79.89 HYPOURICEMIA: ICD-10-CM

## 2021-01-28 DIAGNOSIS — I82.409 ACUTE EMBOLISM AND THROMBOSIS OF UNSPECIFIED DEEP VEINS OF UNSPECIFIED LOWER EXTREMITY (HCC): Primary | ICD-10-CM

## 2021-01-28 DIAGNOSIS — I82.409 ACUTE EMBOLISM AND THROMBOSIS OF UNSPECIFIED DEEP VEINS OF UNSPECIFIED LOWER EXTREMITY (HCC): ICD-10-CM

## 2021-01-28 DIAGNOSIS — R79.89 OTHER SPECIFIED ABNORMAL FINDINGS OF BLOOD CHEMISTRY: ICD-10-CM

## 2021-01-28 DIAGNOSIS — R79.89 HYPOURICEMIA: Primary | ICD-10-CM

## 2021-01-28 PROCEDURE — A9540 TC99M MAA: HCPCS

## 2021-01-28 PROCEDURE — 78582 LUNG VENTILAT&PERFUS IMAGING: CPT

## 2021-01-28 PROCEDURE — 93970 EXTREMITY STUDY: CPT

## 2021-01-28 PROCEDURE — G1004 CDSM NDSC: HCPCS

## 2021-01-28 PROCEDURE — 71046 X-RAY EXAM CHEST 2 VIEWS: CPT

## 2021-01-28 PROCEDURE — 93970 EXTREMITY STUDY: CPT | Performed by: SURGERY

## 2021-01-29 ENCOUNTER — TRANSCRIBE ORDERS (OUTPATIENT)
Dept: ADMINISTRATIVE | Facility: HOSPITAL | Age: 76
End: 2021-01-29

## 2021-01-29 DIAGNOSIS — Z95.828 PRESENCE OF OTHER VASCULAR IMPLANTS AND GRAFTS: Primary | ICD-10-CM

## 2021-01-29 DIAGNOSIS — R79.89 OTHER SPECIFIED ABNORMAL FINDINGS OF BLOOD CHEMISTRY: ICD-10-CM

## 2021-02-03 ENCOUNTER — HOSPITAL ENCOUNTER (OUTPATIENT)
Dept: NON INVASIVE DIAGNOSTICS | Facility: CLINIC | Age: 76
Discharge: HOME | End: 2021-02-03
Payer: MEDICARE

## 2021-02-03 DIAGNOSIS — Z95.828 PRESENCE OF OTHER VASCULAR IMPLANTS AND GRAFTS: ICD-10-CM

## 2021-02-03 DIAGNOSIS — R79.89 OTHER SPECIFIED ABNORMAL FINDINGS OF BLOOD CHEMISTRY: ICD-10-CM

## 2021-02-03 PROCEDURE — 93306 TTE W/DOPPLER COMPLETE: CPT

## 2021-02-03 PROCEDURE — 93306 TTE W/DOPPLER COMPLETE: CPT | Performed by: INTERNAL MEDICINE

## 2021-02-08 ENCOUNTER — APPOINTMENT (EMERGENCY)
Dept: RADIOLOGY | Facility: HOSPITAL | Age: 76
End: 2021-02-08
Payer: MEDICARE

## 2021-02-08 ENCOUNTER — HOSPITAL ENCOUNTER (EMERGENCY)
Facility: HOSPITAL | Age: 76
Discharge: HOME | End: 2021-02-08
Attending: SURGERY | Admitting: SURGERY
Payer: MEDICARE

## 2021-02-08 ENCOUNTER — APPOINTMENT (EMERGENCY)
Dept: CT IMAGING | Facility: HOSPITAL | Age: 76
End: 2021-02-08
Payer: MEDICARE

## 2021-02-08 VITALS
TEMPERATURE: 97.6 F | RESPIRATION RATE: 18 BRPM | OXYGEN SATURATION: 96 % | SYSTOLIC BLOOD PRESSURE: 141 MMHG | HEART RATE: 62 BPM | DIASTOLIC BLOOD PRESSURE: 64 MMHG
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
## 2021-02-08 LAB
ATRIAL RATE: 57 BPM
P AXIS: 86 DEGREES
PR INTERVAL: 244 MS
QRS AXIS: 55 DEGREES
QRSD INTERVAL: 132 MS
QT INTERVAL: 478 MS
QTC INTERVAL: 465 MS
T WAVE AXIS: -9 DEGREES
VENTRICULAR RATE: 57 BPM

## 2021-02-08 PROCEDURE — NC001 PR NO CHARGE: Performed by: EMERGENCY MEDICINE

## 2021-02-08 PROCEDURE — 93005 ELECTROCARDIOGRAM TRACING: CPT

## 2021-02-08 PROCEDURE — 99285 EMERGENCY DEPT VISIT HI MDM: CPT

## 2021-02-08 PROCEDURE — 71045 X-RAY EXAM CHEST 1 VIEW: CPT

## 2021-02-08 PROCEDURE — 70450 CT HEAD/BRAIN W/O DYE: CPT

## 2021-02-08 PROCEDURE — 72125 CT NECK SPINE W/O DYE: CPT

## 2021-02-08 PROCEDURE — 99284 EMERGENCY DEPT VISIT MOD MDM: CPT | Performed by: SURGERY

## 2021-02-08 PROCEDURE — 93010 ELECTROCARDIOGRAM REPORT: CPT | Performed by: INTERNAL MEDICINE

## 2021-02-08 RX ORDER — ACETAMINOPHEN 325 MG/1
975 TABLET ORAL EVERY 8 HOURS PRN
Refills: 0
Start: 2021-02-08

## 2021-02-08 NOTE — ED PROVIDER NOTES
Emergency Department Airway Evaluation and Management Form    History  Obtained from: patient  Lorazepam  No chief complaint on file  HPI    Patient is a 58-year-old male on Eliquis who presents after a mechanical fall  Patient was walking outside and slipped on a patch of ice  He fell backwards striking his head  There was no loss of consciousness  He was able to get himself off of the ground and ambulate afterwards  Fall occurred around 1-130 p m  His wife finally convinced him to come to the emergency department for evaluation  Patient did take his morning dose of Eliquis today  He denies any other complaints  GCS = 15    Past Medical History:   Diagnosis Date    Anxiety     Aortic dissection (HCC)     Depression     Hyperlipidemia     Hypertension     Kidney stone     Renal cyst     Renal cyst     Sleep apnea     Sleep apnea, obstructive      Past Surgical History:   Procedure Laterality Date    ABDOMINAL AORTIC ANEURYSM REPAIR      CELIAC ARTERY STENT      KIDNEY STONE SURGERY       Family History   Problem Relation Age of Onset    Stroke Mother     Prostate cancer Father      Social History     Tobacco Use    Smoking status: Former Smoker     Packs/day: 1 00     Years: 24 00     Pack years: 24 00     Types: Cigarettes     Start date: 1959     Quit date: 1983     Years since quittin 8    Smokeless tobacco: Never Used   Substance Use Topics    Alcohol use: No    Drug use: No     I have reviewed and agree with the history as documented  Review of Systems    Physical Exam  /68   Pulse 64   Temp 97 6 °F (36 4 °C) (Axillary)   Resp 18     Physical Exam    ED Medications  Medications - No data to display    Intubation  Procedures    Notes  I responded to trauma level be for airway management  Airway intact and patient does not require any airway interventions at this time  Trauma team present to continue with primary and secondary surveys    Trauma team to take over management of this patient      Final Diagnosis  Final diagnoses:   None       ED Provider  Electronically Signed by     Oxana Salcido DO  02/08/21 [id number]

## 2021-02-09 NOTE — DISCHARGE INSTRUCTIONS
New Right Bundle Branch Block seen on EKG 2/8/21    Concussion   WHAT YOU NEED TO KNOW:   A concussion is a mild brain injury  It is usually caused by a bump or blow to the head from a fall, a motor vehicle crash, or a sports injury  Sometimes being shaken forcefully may cause a concussion  DISCHARGE INSTRUCTIONS:   Have someone call 911 for any of the following:   · Someone tries to wake you and cannot do so  · You have a seizure, increasing confusion, or a change in personality  · Your speech becomes slurred, or you have new vision problems  Return to the emergency department if:   · You have sudden and new vision problems  · You have a severe headache that does not go away  · You have arm or leg weakness, numbness, or new problems with coordination  · You have blood or clear fluid coming out of the ears or nose  Contact your healthcare provider if:   · You have nausea or are vomiting  · You feel more sleepy than usual     · Your symptoms get worse  · Your symptoms last longer than 6 weeks after the injury  · You have questions or concerns about your condition or care  Medicines: You may need any of the following:  · Acetaminophen  decreases pain and fever  It is available without a doctor's order  Ask how much to take and how often to take it  Follow directions  Read the labels of all other medicines you are using to see if they also contain acetaminophen, or ask your doctor or pharmacist  Acetaminophen can cause liver damage if not taken correctly  Do not use more than 4 grams (4,000 milligrams) total of acetaminophen in one day  · NSAIDs  help decrease swelling and pain or fever  This medicine is available with or without a doctor's order  NSAIDs can cause stomach bleeding or kidney problems in certain people  If you take blood thinner medicine, always ask your healthcare provider if NSAIDs are safe for you  Always read the medicine label and follow directions      · Take your medicine as directed  Contact your healthcare provider if you think your medicine is not helping or if you have side effects  Tell him or her if you are allergic to any medicine  Keep a list of the medicines, vitamins, and herbs you take  Include the amounts, and when and why you take them  Bring the list or the pill bottles to follow-up visits  Carry your medicine list with you in case of an emergency  Self-care:  Concussion symptoms usually go away within about 10 days, but they may last longer  The following may be recommended to manage your symptoms:  · Rest from physical and mental activities as directed  Mental activities are those that require thinking, concentration, and attention  You will need to rest until your symptoms are gone  Rest will allow you to recover from your concussion  Ask your healthcare provider when you can return to work and other daily activities  · Have someone stay with you for the first 24 hours after your injury  Your healthcare provider should be contacted if your symptoms get worse, or you develop new symptoms  · Do not participate in sports and physical activities until your healthcare provider says it is okay  They could make your symptoms worse or lead to another concussion  Your healthcare provider will tell you when it is okay for you to return to sports or physical activities  Ask for more information about sports concussions  Prevent another concussion:   · Wear protective sports equipment that fits properly  Helmets help decrease your risk for a serious brain injury  Talk to your healthcare provider about ways you can decrease your risk for a concussion if you play sports  · Wear your seatbelt every time you travel  This helps to decrease your risk for a head injury if you are in a car accident  Follow up with your healthcare provider as directed:  Write down your questions so you remember to ask them during your visits     © Copyright [employer] [facility] Information is for [employer] use only and may not be sold, redistributed or otherwise used for commercial purposes  All illustrations and images included in CareNotes® are the copyrighted property of A D A M , Inc  or [employer]   The above information is an  only  It is not intended as medical advice for individual conditions or treatments  Talk to your doctor, nurse or pharmacist before following any medical regimen to see if it is safe and effective for you

## 2021-02-09 NOTE — QUICK NOTE
Cervical Collar Clearance: The patient had a CT scan of the cervical spine demonstrating no acute injury  On exam, the patient had no midline point tenderness or paresthesias/numbness/weakness in the extremities  The patient had full range of motion (was then able to flex, extend, and rotate head laterally) without pain  There were no distracting injuries and the patient was not intoxicated  The patient's cervical spine was cleared radiologically and clinically     Bautisat Hansen PA-C  2/8/2021 7:57 PM
none

## 2021-02-09 NOTE — H&P
H&P Exam - Trauma   Jamal Castillo  67 y o  male MRN: [id number]  Unit/Bed#: [facility] Encounter: [id number]    Assessment/Plan   Trauma Alert: Level B  Model of Arrival: Self  Trauma Team: Attending Mariza Astorga and JONY Hooker  Consultants: None    Trauma Active Problems: Fall on Eliquis    Trauma Plan: DC to home with PCP follow up regarding new RBBB  Concussion information provided    Chief Complaint: Fall on ice    History of Present Illness   HPI:  Jamal Castillo  is a 67 y o  male with PMH of descending aortic aneurysm s/p stent on Eliquis presents with complaints of falling on the ice earlier in the day  He reports he landed on his head then his buttock  He denies LOC, headache, dizziness, photophobia, or nausea  He reports he was able to stand on his own and ambulate to his neighbor's house and then back to his own home  He denies other injuries, pain, neck pain, difficulty ambulating, or tenderness  He came to the ER his wife's insistence to be checked out since he takes a blood thinner  Mechanism:Fall    Review of Systems   Constitutional: Negative for activity change, appetite change, chills, diaphoresis and fever  HENT: Negative  Negative for congestion, facial swelling, nosebleeds, postnasal drip, rhinorrhea, sinus pressure and sinus pain  Eyes: Negative  Negative for photophobia and visual disturbance  Respiratory: Negative for cough, chest tightness, shortness of breath and wheezing  Cardiovascular: Negative for chest pain, palpitations and leg swelling  Gastrointestinal: Negative for abdominal distention, abdominal pain, blood in stool, constipation, diarrhea, nausea and vomiting  Endocrine: Negative  Genitourinary: Negative for flank pain, frequency, hematuria and urgency  Musculoskeletal: Negative  Negative for arthralgias, back pain, joint swelling, neck pain and neck stiffness     Neurological: Negative for dizziness, seizures, syncope, weakness, light-headedness, numbness and headaches  Psychiatric/Behavioral: Negative  12-point, complete review of systems was reviewed and negative except as stated above         Historical Information     Past Medical History:   Diagnosis Date    Anxiety     Aortic dissection (HCC)     Depression     Hyperlipidemia     Hypertension     Kidney stone     Renal cyst     Renal cyst     Sleep apnea     Sleep apnea, obstructive      Past Surgical History:   Procedure Laterality Date    ABDOMINAL AORTIC ANEURYSM REPAIR      CELIAC ARTERY STENT      KIDNEY STONE SURGERY       Social History   Social History     Substance and Sexual Activity   Alcohol Use No     Social History     Substance and Sexual Activity   Drug Use No     Social History     Tobacco Use   Smoking Status Former Smoker    Packs/day: 1 00    Years: 24 00    Pack years: 24     Types: Cigarettes    Start date: 1959   Bernardo Bones Quit date: 1983    Years since quittin 8   Smokeless Tobacco Never Used     E-Cigarette/Vaping     E-Cigarette/Vaping Substances     Immunization History   Administered Date(s) Administered    INFLUENZA 2014, 2015, 2016, 10/23/2017, 10/01/2019    Influenza, seasonal, injectable 1945    Pneumococcal Polysaccharide PPV23 1945    SARS-CoV-2 / COVID-19 mRNA IM ([employer]) 2021     Last Tetanus:  N/A  Family History: Non-contributory      Meds/Allergies   all current active meds have been reviewed    Allergies   Allergen Reactions    Lorazepam Hyperactivity         PHYSICAL EXAM      Objective   Vitals:   First set: Temperature: 97 6 °F (36 4 °C) (21 1833)  Pulse: 64 (21 1833)  Respirations: 18 (21 1833)  Blood Pressure: 149/68 (21 1833)    Primary Survey:   (A) Airway:  Intact  (B) Breathing:  Equal bilaterally  (C) Circulation: Pulses:   pedal  1/4 and radial  2/4  (D) Disabliity:  GCS Total:  15  (E) Expose:  Completed    Secondary Survey: (Click on Physical Exam tab above)  Physical Exam  Vitals signs and nursing note reviewed  Constitutional:       General: He is not in acute distress  Appearance: Normal appearance  He is not ill-appearing  HENT:      Head: Normocephalic and atraumatic  Right Ear: Tympanic membrane normal       Left Ear: Tympanic membrane normal       Nose: Nose normal  No congestion or rhinorrhea  Mouth/Throat:      Mouth: Mucous membranes are moist       Pharynx: No oropharyngeal exudate  Eyes:      Extraocular Movements: Extraocular movements intact  Pupils: Pupils are equal, round, and reactive to light  Neck:      Musculoskeletal: Normal range of motion and neck supple  No muscular tenderness  Cardiovascular:      Rate and Rhythm: Normal rate and regular rhythm  Heart sounds: No murmur  No friction rub  No gallop  Pulmonary:      Effort: Pulmonary effort is normal       Breath sounds: Normal breath sounds  No wheezing, rhonchi or rales  Abdominal:      General: Bowel sounds are normal       Palpations: Abdomen is soft  There is no mass  Tenderness: There is no abdominal tenderness  There is no rebound  Musculoskeletal: Normal range of motion  General: No tenderness, deformity or signs of injury  Skin:     General: Skin is warm and dry  Capillary Refill: Capillary refill takes less than 2 seconds  Findings: No bruising  Neurological:      General: No focal deficit present  Mental Status: He is alert and oriented to person, place, and time  Motor: No weakness           Invasive Devices     None                 Lab Results: ISTAT: No components found for: VBG  Imaging/EKG Studies: Chest X-Ray: neg, CT Scan Head: neg, CT Scan C-Spine: neg, EKG with new right bundle branch block and known first-degree AV block  Other Studies:  None    Code Status: No Order

## 2021-02-12 ENCOUNTER — IMMUNIZATIONS (OUTPATIENT)
Dept: FAMILY MEDICINE CLINIC | Facility: HOSPITAL | Age: 76
End: 2021-02-12

## 2021-02-12 DIAGNOSIS — Z23 ENCOUNTER FOR IMMUNIZATION: Primary | ICD-10-CM

## 2021-02-12 PROCEDURE — 91300 SARS-COV-2 / COVID-19 MRNA VACCINE (PFIZER-BIONTECH) 30 MCG: CPT

## 2021-02-12 PROCEDURE — 0002A SARS-COV-2 / COVID-19 MRNA VACCINE (PFIZER-BIONTECH) 30 MCG: CPT

## 2021-03-09 ENCOUNTER — HOSPITAL ENCOUNTER (OUTPATIENT)
Dept: NON INVASIVE DIAGNOSTICS | Facility: CLINIC | Age: 76
Discharge: HOME | End: 2021-03-09
Payer: MEDICARE

## 2021-03-09 DIAGNOSIS — I71.4 ABDOMINAL AORTIC ANEURYSM (AAA) WITHOUT RUPTURE (HCC): ICD-10-CM

## 2021-03-09 DIAGNOSIS — I72.4 ANEURYSM OF POPLITEAL ARTERY (HCC): ICD-10-CM

## 2021-03-09 PROCEDURE — 93925 LOWER EXTREMITY STUDY: CPT

## 2021-03-09 PROCEDURE — 93925 LOWER EXTREMITY STUDY: CPT | Performed by: SURGERY

## 2021-03-09 PROCEDURE — 93978 VASCULAR STUDY: CPT

## 2021-03-09 PROCEDURE — 93922 UPR/L XTREMITY ART 2 LEVELS: CPT | Performed by: SURGERY

## 2021-03-09 PROCEDURE — 93978 VASCULAR STUDY: CPT | Performed by: SURGERY

## 2021-03-09 PROCEDURE — 93923 UPR/LXTR ART STDY 3+ LVLS: CPT

## 2021-03-18 ENCOUNTER — OFFICE VISIT (OUTPATIENT)
Dept: VASCULAR SURGERY | Facility: CLINIC | Age: 76
End: 2021-03-18
Payer: MEDICARE

## 2021-03-18 VITALS
BODY MASS INDEX: 31.22 KG/M2 | DIASTOLIC BLOOD PRESSURE: 80 MMHG | HEART RATE: 61 BPM | SYSTOLIC BLOOD PRESSURE: 140 MMHG | HEIGHT: 71 IN | WEIGHT: 223 LBS

## 2021-03-18 DIAGNOSIS — I65.23 BILATERAL CAROTID ARTERY STENOSIS: Primary | ICD-10-CM

## 2021-03-18 DIAGNOSIS — I65.23 ATHEROSCLEROSIS OF BOTH CAROTID ARTERIES: ICD-10-CM

## 2021-03-18 DIAGNOSIS — I73.9 PAD (PERIPHERAL ARTERY DISEASE) (HCC): ICD-10-CM

## 2021-03-18 DIAGNOSIS — I72.4 ANEURYSM OF POPLITEAL ARTERY (HCC): ICD-10-CM

## 2021-03-18 DIAGNOSIS — I71.4 ABDOMINAL AORTIC ANEURYSM (AAA) WITHOUT RUPTURE (HCC): ICD-10-CM

## 2021-03-18 PROCEDURE — 99213 OFFICE O/P EST LOW 20 MIN: CPT | Performed by: NURSE PRACTITIONER

## 2021-03-18 RX ORDER — APIXABAN 5 MG/1
5 TABLET, FILM COATED ORAL 2 TIMES DAILY
COMMUNITY
Start: 2021-02-22 | End: 2021-08-23 | Stop reason: HOSPADM

## 2021-03-18 NOTE — ASSESSMENT & PLAN NOTE
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

## 2021-03-18 NOTE — PATIENT INSTRUCTIONS
Carotid Artery Disease   AMBULATORY CARE:   Carotid artery disease  is a condition that causes narrow or blocked carotid arteries  Your carotid arteries are the blood vessels that supply your brain with most of the blood it needs to work  You have 2 carotid arteries, one on each side of your neck  Call 911 for any of the following:   · You have any of the following signs of a stroke:      ? Numbness or drooping on one side of your face     ? Weakness in an arm or leg    ? Confusion or difficulty speaking    ? Dizziness, a severe headache, or vision loss    · You have any of the following signs of a heart attack:      ? Squeezing, pressure, or pain in your chest    ? You may  also have any of the following:     § Discomfort or pain in your back, neck, jaw, stomach, or arm    § Shortness of breath    § Nausea or vomiting    § Lightheadedness or a sudden cold sweat    Contact your healthcare provider if:   · You have questions or concerns about your condition or care  Signs and symptoms of carotid artery disease: You may have no signs or symptoms  Most commonly, carotid artery disease causes transient ischemic attacks (TIAs), or mini-strokes  You may have numbness, weakness, lack of movement, or vision or speech problems  A TIA goes away quickly and does not cause permanent damage  A TIA may be a warning sign that you are about to have a stroke  If you have any symptoms of a TIA or stroke, seek care immediately  Warning signs of a stroke: The word F A S T  can help you remember and recognize warning signs of a stroke  · F = Face:  One side of the face droops  · A = Arms:  One arm starts to drop when both arms are raised  · S = Speech:  Speech is slurred or sounds different than usual     · T = Time:  A person who is having a stroke needs to be seen immediately  A stroke is a medical emergency that needs immediate treatment   Some medicines and treatments work best if given within a few hours of a stroke  Treatment  for carotid artery disease depends on how narrow your arteries have become, your symptoms, and your general health  The goal of treatment is to lower your risk for a stroke  You may need any of the following:  · Take aspirin if directed  Your healthcare provider may suggest that you take an aspirin a day to prevent blood clots from forming in the carotid arteries  If your healthcare provider wants you to take aspirin daily, do not take acetaminophen or ibuprofen instead  · Control risk factors  High blood pressure, high cholesterol, heart disease, diabetes, and being overweight increase your risk for atherosclerosis  · Procedures can help open blocked arteries  A carotid endarterectomy is used to cut plaque out of the artery  An angioplasty is used to push the plaque against the artery wall with a balloon device  Sometimes a stent is placed during an angioplasty  A stent is a metal mesh tube that is placed in the artery to keep it open  Manage carotid artery disease:   · Eat a variety of healthy foods  Healthy foods include fruit, vegetables, whole-grain breads, low-fat dairy products, lean meat, and fish  Choose fish that are high in omega-3 fatty acids, such as salmon and fresh tuna  Ask your healthcare provider for more information on a heart healthy diet and the DASH eating plan  · Limit sodium (salt)  Sodium may increase your blood pressure  Add less table salt to your foods  Read food labels and choose foods that are low in sodium  Your healthcare provider may suggest you follow a low sodium diet  · Reach or maintain a healthy weight  Extra weight makes your heart work harder  Ask your healthcare provider how much you should weight  He can help you create a safe weight loss plan  Even a weight loss of 10% of your body weight can help your heart function better  · Exercise as directed    Exercise helps improve heart function and can help you manage your weight  Exercise can also help lower your cholesterol and blood sugar levels  Try to get at least 30 minutes of exercise at least 5 times each week  Try to be active every day  Your healthcare provider can help you create an exercise plan that works best for you  · Limit alcohol  Alcohol can increase your blood pressure and triglyceride levels  Men should limit alcohol to 2 drinks per day  Women should limit alcohol to 1 drink per day  A drink of alcohol is 12 ounces of beer, 5 ounces of wine, or 1½ ounces of liquor  · Do not smoke  Nicotine and other chemicals in cigarettes and cigars can cause heart and lung damage  Ask your healthcare provider for information if you currently smoke and need help to quit  E-cigarettes or smokeless tobacco still contain nicotine  Talk to your healthcare provider before you use these products  Follow up with your healthcare provider as directed:  Write down your questions so you remember to ask them during your visits  © Copyright [address] [place] [address] Drive Information is for End User's use only and may not be sold, redistributed or otherwise used for commercial purposes  All illustrations and images included in CareNotes® are the copyrighted property of [employer]  or [address] [place] [address]  The above information is an  only  It is not intended as medical advice for individual conditions or treatments  Talk to your doctor, nurse or pharmacist before following any medical regimen to see if it is safe and effective for you  Nonruptured Abdominal Aortic Aneurysm   WHAT YOU NEED TO KNOW:   What is an abdominal aortic aneurysm (AAA)? An AAA is a bulging or weak area in your abdominal aorta  Over time, the bulge may grow and is at risk for tearing or rupturing  The aorta is a large blood vessel that extends from your heart to your abdomen  The part of the aorta that extends into your abdomen is called your abdominal aorta   Your abdominal aorta brings blood to your stomach, pelvis, and legs  An AAA that ruptures is a life-threatening emergency  What increases my risk for an AAA? · Smoking    · High blood pressure or atherosclerosis (the buildup of fat and cholesterol in your arteries)    · Being overweight or obese    · A family or personal history of a AAA    · Age older than 61    · Being male    · A connective tissue disease such as Marfan syndrome    What are the signs and symptoms of an AAA? An AAA usually does not have signs or symptoms if it has not ruptured  If the AAA starts to leak or ruptures, you may have any of the following:  · Sudden pain in your abdomen, groin, back, legs, or buttocks    · Nausea and vomiting    · A lump or swelling in your abdomen    · Stiff abdominal muscles    · Numbness or tingling in your legs    · Pale, sweaty, or clammy skin    · Dizziness, fainting or loss of consciousness    What do I need to know about screening for a nonruptured AAA? Screening means you are checked 1 time based on AAA risk factors  Ultrasound pictures are commonly used  Screening is offered to adults 72to 76years old  · If you are a man:      ? Screening is recommended if you are a current or past smoker  ? Screening may be  recommended if you never smoked but have AAA risk factors  · If you are a woman:      ? Screening may be  recommended if you are a current or past smoker or have AAA risk factors  Your healthcare provider will recommend screening only if the benefits outweigh the risks for you     ? Screening is not recommended if you never smoked  · Your provider will help you understand the benefits and risks of screening:     ? The benefit  is that your provider can monitor a small AAA or treat a large AAA sooner  ? The risk  is that surgery may be used to treat an AAA that would not have ruptured  Surgery for a nonruptured AAA has risks, but it is less risky than an AAA rupture  How is a nonruptured AAA diagnosed?   An AAA is often found when you have a test or exam for another condition  Ultrasound, CT scan, MRI, or angiography pictures may be used to measure the size and location of your AAA  How is an AAA treated? Your AAA may not need treatment  Your healthcare provider may monitor the size of your AAA with tests, such as an ultrasound  You may be given medicines to prevent the AAA from growing  Examples include medicines to lower your blood pressure or cholesterol level  If your AAA gets bigger, starts to leak, or ruptures, you may need any of the following:  · Endovascular repair  is a procedure that uses a graft to repair your AAA  The graft stops blood flow to the aneurysm and protects your abdominal aorta  You may need to have more than 1 endovascular repair  · Open repair  is surgery to repair or remove an AAA  What can I do to manage a nonruptured AAA? You can help prevent your AAA from growing or rupturing by doing the following:  · Do not smoke  Nicotine and other chemicals in cigarettes and cigars can increase your blood pressure  It can also damage your aorta and increase the size of your AAA  Ask your healthcare provider for information if you currently smoke and need help to quit  E-cigarettes or smokeless tobacco still contain nicotine  Talk to your healthcare provider before you use these products  · Exercise as directed  Exercise can help control your blood pressure and cholesterol level  Ask your healthcare provider how much exercise you need each day and which exercises are best for you  · Follow the meal plan recommended by your healthcare provider  Talk to your dietitian about a heart-healthy or low-sodium eating plan  Meal plans will help you lower your cholesterol and blood pressure  They will also help you reach a healthy weight  · Do not lift anything heavier than 10 pounds  Heavy lifting can increase pressure in your abdominal aorta   This can increase your risk for a ruptured AAA     Call your local emergency number (911 in the [place]), or have someone else call if:   · You faint or lose consciousness  · You cannot be woken  When should I seek immediate care? The following signs or symptoms may mean the AAA is at risk of rupturing:  · You have sudden sharp pain in your abdomen, groin, back, legs, or buttocks  · You have nausea and are vomiting  · You feel dizzy  · You have stiffness or swelling in your abdomen, or a lump in your abdomen  · You have numbness or tingling in your legs  · Your skin is pale, sweaty, or clammy  When should I call my doctor? · You have questions or concerns about your condition or care  CARE AGREEMENT:   You have the right to help plan your care  Learn about your health condition and how it may be treated  Discuss treatment options with your healthcare providers to decide what care you want to receive  You always have the right to refuse treatment  The above information is an  only  It is not intended as medical advice for individual conditions or treatments  Talk to your doctor, nurse or pharmacist before following any medical regimen to see if it is safe and effective for you  © Copyright [address] Information is for End User's use only and may not be sold, redistributed or otherwise used for commercial purposes  All illustrations and images included in CareNotes® are the copyrighted property of [employer]  or [address] [place] [address]  Peripheral Artery Disease   WHAT YOU NEED TO KNOW:   What is peripheral artery disease? Peripheral artery disease (PAD) is narrow, weak, or blocked arteries  It may affect any arteries outside of your heart and brain  PAD is usually the result of a buildup of fat and cholesterol, also called plaque, along your artery walls  Inflammation, a blood clot, or abnormal cell growth could also block your arteries  PAD prevents normal blood flow to your legs and arms   You are at risk of an amputation if poor blood flow keeps wounds from healing or causes gangrene (tissue death)  Without treatment, PAD can also cause a heart attack or stroke  What increases my risk for PAD? · Smoking cigarettes     · Diabetes     · High blood pressure     · High cholesterol     · Age older than 40 years    · Heart disease or a family history of heart disease    What are the signs and symptoms of PAD? Mild PAD usually does not cause symptoms  As the disease worsens over time, you may have the following:  · Pain or cramps in your leg or hip while you walk     · A numb, weak, or heavy feeling in your legs     · Dry, scaly, red, or pale skin on your legs     · Thick or brittle nails, or hair loss on your arms and legs     · Foot sores that will not heal     · Burning or aching in your feet and toes while resting (this may be worse when you lie down)    How is PAD diagnosed? · Angiography  is a test that shows pictures of the arteries in your arms and legs  You will be given contrast liquid to help the arteries show up better on the pictures  The pictures will be taken with an MRI or CT scan  Tell the healthcare provider if you have ever had an allergic reaction to contrast liquid  Do not enter the MRI room with anything metal  Metal can cause serious injury  Tell a healthcare provider if you have any metal in or on your body  · Doppler ankle brachial index (ULISES)  is a test that compares blood pressure in your ankles to blood pressure in your arms  This tells your healthcare provider how well blood is flowing through the arteries in your legs  How is PAD treated? Treatment can help reduce your risk of a heart attack, stroke, or amputation  You may need more than one of the following:  · Medicines  may be given  Your healthcare provider may give you any of the following:     ? Antiplatelet medicine,  such as aspirin, helps prevent blood clots and reduces the risk of a heart attack or stroke       ? Statin medicine  helps lower your cholesterol and prevents your PAD from getting worse  · A supervised exercise program  helps you stay active in normal daily activities and may prevent disability  Healthcare providers will help you safely walk or do strength training exercises 3 times a week for 30 to 60 minutes  You will do this for several months, then transition to walking on your own  · Angioplasty  is a procedure to open your artery so blood can flow through normally  A thin tube called a catheter is used to insert a small balloon into your artery  The balloon is inflated to open your blocked artery, and then removed  A tube called a stent may be placed in your artery to hold it open  · Bypass surgery  is used to make a new connection to your artery with a vein from another part of your body, or an artificial graft  The vein or graft is attached to your artery above and below your blockage  This allows blood to flow around the blocked portion of your artery  How can I manage PAD? · Do not smoke  Nicotine and other chemicals in cigarettes and cigars can worsen PAD  They can also increase your risk for a heart attack or stroke  Ask your healthcare provider for information if you currently smoke and need help to quit  E-cigarettes or smokeless tobacco still contain nicotine  Talk to your healthcare provider before you use these products  · Manage other health conditions  Take your medicines as directed  Follow your healthcare provider's instructions if you have high blood pressure or high cholesterol  Perform foot care and check your blood sugar levels as directed if you have diabetes  · Eat heart healthy foods  Eat whole grains, fruits, and vegetables every day  Limit salt and high-fat foods  Ask your healthcare provider for more information on a heart healthy diet  Ask if you need to lose weight  Your healthcare provider can help you create a healthy weight-loss plan         Call 911 for the following:   · You have any of the following signs of a heart attack:      ? Squeezing, pressure, or pain in your chest    ? You may  also have any of the following:     ? Discomfort or pain in your back, neck, jaw, stomach, or arm    ? Shortness of breath    ? Nausea or vomiting    ? Lightheadedness or a sudden cold sweat    · You have any of the following signs of a stroke:      ? Numbness or drooping on one side of your face     ? Weakness in an arm or leg    ? Confusion or difficulty speaking    ? Dizziness, a severe headache, or vision loss    When should I seek immediate care? · You have sores or wounds that will not heal      · You notice black or discolored skin on your arm or leg  · Your skin is cool to the touch  When should I contact my healthcare provider? · You have leg pain when you walk 1/8 mile (200 meters) or less, even with treatment  · Your legs are red, dry, or pale, even with treatment  · You have questions or concerns about your condition or care  CARE AGREEMENT:   You have the right to help plan your care  Learn about your health condition and how it may be treated  Discuss treatment options with your healthcare providers to decide what care you want to receive  You always have the right to refuse treatment  The above information is an  only  It is not intended as medical advice for individual conditions or treatments  Talk to your doctor, nurse or pharmacist before following any medical regimen to see if it is safe and effective for you  © Copyright [address] Information is for End User's use only and may not be sold, redistributed or otherwise used for commercial purposes   All illustrations and images included in CareNotes® are the copyrighted property of [employer]  or [address] [place] [address]

## 2021-03-18 NOTE — PROGRESS NOTES
Assessment/Plan:    Carotid atherosclerosis  67year old male with HTN, HLD, type B aortic dissection status post TEVAR 2011, left subclavian to carotid bypass in preparation of TEVAR, mild bilateral carotid stenosis, AAA, PAD, bilateral popliteal ectasia to returns to the office to review CV duplex 11/30/20 and AOIL/LEAD 3/9/21  -CV duplex showed less than 50% bilateral ICA stenosis, patent left CCA to SCA bypass, alternating by vertebral flow bilaterally   -Continue aspirin and atorvastatin  -Blood pressure well controlled   -Repeat duplex in 1 year   -Follow up in the office in 1 year    Aneurysm of abdominal aorta ([facility] [address] )  -AOIL 3/9/21  Showed AAA maximal diameter 3 3 cm, chronic juxtarenal aortic dissection to  Right renal, celiac/ SMA 1 1 cm, patent left renal artery   -Blood pressure well controlled   -Continue antiplatelet and statin therapies for best medical management   -Repeat duplex in 1 year   -Follow up in the office in 1 year    Aneurysm of popliteal artery ([address] )  -LEAD 3/9/21 showed right lower extremity diffuse fem-pop disease without focal stenosis, right popliteal artery measures 1 1 cm, right ULISES 1 3/157/143 and left lower extremity diffuse  fem-pop disease without focal stenosis, left popliteal artery measures 1 1 cm, left ULISES 1 27/206/128  -Continue aspirin and statin therapy for best medical management   -Encouraged to remain active   -Repeat arterial duplex in 1 year   -Follow up in the office in 1 year       Diagnoses and all orders for this visit:    Bilateral carotid artery stenosis  -     VAS carotid complete study; Future    PAD (peripheral artery disease) (HCC)  -     VAS lower limb arterial duplex, complete bilateral; Future    Aneurysm of popliteal artery (HCC)  -     VAS lower limb arterial duplex, complete bilateral; Future    Abdominal aortic aneurysm (AAA) without rupture (HCC)  -     VAS abdominal aorta/iliacs; complete study;  Future    Atherosclerosis of both carotid arteries    Other orders  -     Eliquis 5 MG; Take 5 mg by mouth 2 (two) times a day          Subjective:      Patient ID: Maty Vega  is a 67 y o  male  Patient is here today to review CV done 11/30/2020 and AOIL and GERMAN done 3/9/2021  Patient denies TIA/Stroke symptoms  Patient denies back pain, abdominal pain, and pain after eating  Patient takes ASA 81mg, Eliquis, and Atorvastatin  HPI  67year old male with HTN, HLD, type B aortic dissection status post TEVAR 2011, left subclavian to carotid bypass in preparation of TEVAR, mild bilateral carotid stenosis, AAA, PAD, bilateral popliteal ectasia to returns to the office to review CV duplex 11/30/20 and AOIL/LEAD 3/9/21   patient last in the office 2019  He has had routine arterial studies  He denies any focal neurological events consistent with TIA / CVA  He is on aspirin, Eliquis and atorvastatin  No issues with bleeding  He denies any lower extremity claudication symptoms  He denies any back pain  His only complaint is of poorly formed stools  He  Will be following up with Dr Lin Monge for this  The following portions of the patient's history were reviewed and updated as appropriate: allergies, current medications, past family history, past medical history, past social history, past surgical history and problem list   ROS reviewed     Review of Systems   Constitutional: Negative  HENT: Negative  Eyes: Negative  Respiratory: Negative  Cardiovascular: Negative  Gastrointestinal: Positive for diarrhea (chronic)  Endocrine: Negative  Genitourinary: Negative  Musculoskeletal: Positive for neck pain  Skin: Negative  Allergic/Immunologic: Negative  Neurological: Negative  Hematological: Negative  Psychiatric/Behavioral: Negative  Objective:    I have reviewed and made appropriate changes to the review of systems input by the medical assistant      Vitals:    03/18/21 0841 03/18/21 0842   BP: 140/60 140/80   BP Location: Right arm Left arm   Patient Position: Sitting Sitting   Pulse: 61    Weight: 101 kg (223 lb)    Height: 5' 11" (1 803 m)        Patient Active Problem List   Diagnosis    Recurrent major depression in full remission ([place] [address] )    Renal cyst, acquired    Sleep apnea    Aneurysm of abdominal aorta ([facility] )    Carotid atherosclerosis    Hypertension    Aneurysm of popliteal artery (HCC)    Hyperlipidemia    Thoracic aortic aneurysm without rupture ([place] [address] )    Bilateral carotid artery stenosis    [facility]) ([facility])       Past Surgical History:   Procedure Laterality Date    ABDOMINAL AORTIC ANEURYSM REPAIR      CELIAC ARTERY STENT      KIDNEY STONE SURGERY         Family History   Problem Relation Age of Onset    Stroke Mother     Prostate cancer Father        Social History     Socioeconomic History    Marital status: /Civil Union     Spouse name: Not on file    Number of children: Not on file    Years of education: Not on file    Highest education level: Not on file   Occupational History    Not on file   Social Needs    Financial resource strain: Not on file    Food insecurity     Worry: Not on file     Inability: Not on file   [employer] needs     Medical: Not on file     Non-medical: Not on file   Tobacco Use    Smoking status: Former Smoker     Packs/day: 1 00     Years: 24 00     Pack years: 24 00     Types: Cigarettes     Start date: 1959     Quit date: 1983     Years since quittin 9    Smokeless tobacco: Never Used   Substance and Sexual Activity    Alcohol use: No    Drug use: No    Sexual activity: Never   Lifestyle    Physical activity     Days per week: Not on file     Minutes per session: Not on file    Stress: Not on file   Relationships    Social connections     Talks on phone: Not on file     Gets together: Not on file     Attends Faith service: Not on file     Active member of club or organization: Not on file Attends meetings of clubs or organizations: Not on file     Relationship status: Not on file    Intimate partner violence     Fear of current or ex partner: Not on file     Emotionally abused: Not on file     Physically abused: Not on file     Forced sexual activity: Not on file   Other Topics Concern    Not on file   Social History Narrative    Not on file       Allergies   Allergen Reactions    Lorazepam Hyperactivity         Current Outpatient Medications:     acetaminophen (TYLENOL) 325 mg tablet, Take 3 tablets (975 mg total) by mouth every 8 (eight) hours as needed for mild pain, Disp: , Rfl: 0    ALPRAZolam (XANAX) 0 25 mg tablet, Take 1 tablet (0 25 mg total) by mouth daily at bedtime as needed for anxiety, Disp: 90 tablet, Rfl: 1    aspirin 81 MG tablet, Take 1 tablet by mouth daily, Disp: , Rfl:     atorvastatin (LIPITOR) 40 mg tablet, Take 40 mg by mouth daily, Disp: , Rfl: 0    Cholecalciferol (VITAMIN D3) 00892 units CAPS, Take by mouth, Disp: , Rfl:     DULoxetine (CYMBALTA) 60 mg delayed release capsule, Take 1 capsule (60 mg total) by mouth daily, Disp: 90 capsule, Rfl: 3    Eliquis 5 MG, Take 5 mg by mouth 2 (two) times a day, Disp: , Rfl:     labetalol (NORMODYNE) 100 mg tablet, Take 0 5 tablets by mouth 2 (two) times a day, Disp: , Rfl:     omeprazole (PriLOSEC) 40 MG capsule, take 1 capsule by mouth once daily TAKE 1/2 HOUR BEFORE BREAKFAST, Disp: 30 capsule, Rfl: 1    BUTALBITAL-ACETAMINOPHEN PO, Take by mouth, Disp: , Rfl:     labetalol (NORMODYNE) 200 mg tablet, Take 100 mg by mouth 2 (two) times a day, Disp: , Rfl:     /80 (BP Location: Left arm, Patient Position: Sitting)   Pulse 61   Ht 5' 11" (1 803 m)   Wt 101 kg (223 lb)   BMI 31 10 kg/m²          Physical Exam  Vitals signs and nursing note reviewed  Constitutional:       Appearance: He is well-developed  HENT:      Head: Normocephalic and atraumatic     Eyes:      Extraocular Movements: Extraocular movements intact  Neck:      Musculoskeletal: Neck supple  Vascular: Carotid bruit present  Cardiovascular:      Pulses:           Femoral pulses are 2+ on the right side and 2+ on the left side  Popliteal pulses are 2+ on the right side and 2+ on the left side  Dorsalis pedis pulses are 2+ on the right side and 2+ on the left side  Posterior tibial pulses are 1+ on the right side and 1+ on the left side  Heart sounds: Normal heart sounds  Pulmonary:      Effort: Pulmonary effort is normal       Breath sounds: Normal breath sounds  Abdominal:      General: Abdomen is flat  Bowel sounds are normal       Palpations: Abdomen is soft  Musculoskeletal: Normal range of motion  Skin:     General: Skin is warm  Neurological:      General: No focal deficit present  Mental Status: He is alert and oriented to person, place, and time     Psychiatric:         Behavior: Behavior normal

## 2021-03-18 NOTE — ASSESSMENT & PLAN NOTE
67year old male with HTN, HLD, type B aortic dissection status post TEVAR 2011, left subclavian to carotid bypass in preparation of TEVAR, mild bilateral carotid stenosis, AAA, PAD, bilateral popliteal ectasia to returns to the office to review CV duplex 11/30/20 and AOIL/LEAD 3/9/21  -CV duplex showed less than 50% bilateral ICA stenosis, patent left CCA to SCA bypass, alternating by vertebral flow bilaterally   -Continue aspirin and atorvastatin  -Blood pressure well controlled   -Repeat duplex in 1 year   -Follow up in the office in 1 year

## 2021-03-18 NOTE — ASSESSMENT & PLAN NOTE
-AOIL 3/9/21  Showed AAA maximal diameter 3 3 cm, chronic juxtarenal aortic dissection to  Right renal, celiac/ SMA 1 1 cm, patent left renal artery   -Blood pressure well controlled   -Continue antiplatelet and statin therapies for best medical management   -Repeat duplex in 1 year   -Follow up in the office in 1 year

## 2021-03-21 DIAGNOSIS — K21.00 GASTROESOPHAGEAL REFLUX DISEASE WITH ESOPHAGITIS WITHOUT HEMORRHAGE: ICD-10-CM

## 2021-03-22 RX ORDER — OMEPRAZOLE 40 MG/1
CAPSULE, DELAYED RELEASE ORAL
Qty: 30 CAPSULE | Refills: 1 | Status: SHIPPED | OUTPATIENT
Start: 2021-03-22 | End: 2021-05-17

## 2021-03-24 PROBLEM — Z86.010 HX OF ADENOMATOUS COLONIC POLYPS: Status: ACTIVE | Noted: 2021-03-24

## 2021-03-24 PROBLEM — Z86.0101 HX OF ADENOMATOUS COLONIC POLYPS: Status: ACTIVE | Noted: 2021-03-24

## 2021-03-25 ENCOUNTER — OFFICE VISIT (OUTPATIENT)
Dept: GASTROENTEROLOGY | Facility: CLINIC | Age: 76
End: 2021-03-25
Payer: MEDICARE

## 2021-03-25 VITALS
HEIGHT: 71 IN | DIASTOLIC BLOOD PRESSURE: 78 MMHG | BODY MASS INDEX: 31.5 KG/M2 | SYSTOLIC BLOOD PRESSURE: 128 MMHG | WEIGHT: 225 LBS | HEART RATE: 88 BPM

## 2021-03-25 DIAGNOSIS — R19.4 CHANGE IN BOWEL HABITS: ICD-10-CM

## 2021-03-25 DIAGNOSIS — K21.00 GASTROESOPHAGEAL REFLUX DISEASE WITH ESOPHAGITIS WITHOUT HEMORRHAGE: Primary | ICD-10-CM

## 2021-03-25 DIAGNOSIS — Z86.010 HX OF ADENOMATOUS COLONIC POLYPS: ICD-10-CM

## 2021-03-25 DIAGNOSIS — E66.3 OVERWEIGHT: ICD-10-CM

## 2021-03-25 PROBLEM — K21.9 GASTROESOPHAGEAL REFLUX: Status: ACTIVE | Noted: 2021-03-25

## 2021-03-25 PROCEDURE — 99203 OFFICE O/P NEW LOW 30 MIN: CPT | Performed by: INTERNAL MEDICINE

## 2021-03-25 NOTE — PROGRESS NOTES
[facility] - Outpatient Follow-up Note  Jayda Gutierrez  67 y o  male MRN: [id number]  Encounter: [id number]          ASSESSMENT AND PLAN:      1  Gastroesophageal reflux disease with esophagitis without hemorrhage   well controlled on omeprazole 40 mg a day we will have him take that every other day  Should he do well we will reduce his dosage to 20 mg a day    2  Change in bowel habits    About a year ago had soft stools usually 1 in the morning without blood he does eat cereal with milk we discussed stopping that additionally he drinks a couple coffees in a more and we will have him drink just 1     3  Hx of adenomatous colonic polyps   colon cancer screening up-to-date next colonoscopy 2022 March 4  Overweight   he will work on exercise and  portion control  He will otherwise follow-up in 3 months     ______________________________________________________________________    SUBJECTIVE:   69-year-old retired [employer] gentleman presents for follow-up we have not seen him in over 3 years  He has gastroesophageal reflux which is controlled on omeprazole 40 mg a day  He has a history of tubular adenomas he  Is up-to-date on colon cancer screening  He describes loose bowel motions in the morning eat cereal on a regular basis we discussed lactose intolerance  He will also reduce his cough he is in the morning  Regarding his weight he will work on exercise and portion control  REVIEW OF SYSTEMS IS OTHERWISE NEGATIVE        Historical Information   Past Medical History:   Diagnosis Date    Anxiety     Aortic dissection (HCC)     Depression     Hyperlipidemia     Hypertension     Kidney stone     Renal cyst     Renal cyst     Sleep apnea     Sleep apnea, obstructive      Past Surgical History:   Procedure Laterality Date    ABDOMINAL AORTIC ANEURYSM REPAIR      CELIAC ARTERY STENT      KIDNEY STONE SURGERY       Social History   Social History     Substance and Sexual Activity   Alcohol Use No     Social History     Substance and Sexual Activity   Drug Use No     Social History     Tobacco Use   Smoking Status Former Smoker    Packs/day: 1 00    Years: 24 00    Pack years: 24 00    Types: Cigarettes    Start date: 1959   [place] Quit date: 1983    Years since quittin 9   Smokeless Tobacco Never Used     Family History   Problem Relation Age of Onset    Stroke Mother     Prostate cancer Father        Meds/Allergies       Current Outpatient Medications:     acetaminophen (TYLENOL) 325 mg tablet    ALPRAZolam (XANAX) 0 25 mg tablet    aspirin 81 MG tablet    atorvastatin (LIPITOR) 40 mg tablet    BUTALBITAL-ACETAMINOPHEN PO    Cholecalciferol (VITAMIN D3) 65334 units CAPS    DULoxetine (CYMBALTA) 60 mg delayed release capsule    Eliquis 5 MG    labetalol (NORMODYNE) 100 mg tablet    labetalol (NORMODYNE) 200 mg tablet    omeprazole (PriLOSEC) 40 MG capsule    Allergies   Allergen Reactions    Lorazepam Hyperactivity           Objective     Blood pressure 128/78, pulse 88, height 5' 11" (1 803 m), weight 102 kg (225 lb)  Body mass index is 31 38 kg/m²  PHYSICAL EXAM:      General Appearance:   Alert, cooperative, no distress   HEENT:   Normocephalic, atraumatic, anicteric      Neck:  Supple, symmetrical, trachea midline   Lungs:   Clear to auscultation bilaterally; no rales, rhonchi or wheezing; respirations unlabored    Heart[de-identified]   Regular rate and rhythm; no murmur, rub, or gallop  Abdomen:   Soft, non-tender, non-distended; normal bowel sounds; no masses, no organomegaly    Genitalia:   Deferred    Rectal:   Deferred    Extremities:  No cyanosis, clubbing or edema    Pulses:  2+ and symmetric    Skin:  No jaundice, rashes, or lesions    Lymph nodes:  No palpable cervical lymphadenopathy        Lab Results:   No visits with results within 1 Day(s) from this visit     Latest known visit with results is:   Admission on 2021, Discharged on 02/08/2021   Component Date Value    Ventricular Rate 02/08/2021 57     Atrial Rate 02/08/2021 57     MO Interval 02/08/2021 244     QRSD Interval 02/08/2021 132     QT Interval 02/08/2021 478     QTC Interval 02/08/2021 465     P Axis 02/08/2021 86     QRS Axis 02/08/2021 55     T Wave Axis 02/08/2021 -9          Radiology Results:   Vas Abdominal Aorta/iliacs; Complete Study    Result Date: 3/9/2021  Narrative:  [facility] REPORT CLINICAL: Indications:  Abdominal Aortic Aneurysm, without Rupture [I71 4]  Yearly surveillance of known AAA  Patient denies any abdominal pain, back pain, or claudication pain in his legs  Operative History: 2011-10-06 Endovascular graft, thoracic aortic aneurysm with coverage of lt subclavian origin Endoluminal graft 2011-09-01 Left Carotid to subclavian bypass (unilateral) Dacron graft 2011-09-01 Left Head & Neck vessel emblization Cardiac Pacemaker Risk Factors The patient has history of HTN, HLD, CAD and previous smoking (quit >10yrs ago)  Clinical Right Pressure:  152/ mm Hg, Left Pressure:  151/ mm Hg  FINDINGS:  Unilateral       Impression  PSV (cm/s)  EDV (cm/s)  AP (cm)  Sup-Teresa Ao                          143          17      3 3  Sup Renal Aorta                                         33 4  Jux Renal Aorta                                          3 3  Px Inf-Zackery Ao    Dissection         117           9      3 1  Ds Inf-Zackery Ao    Dissection         159          12      2 5  Celiac                               83          21      1 1  Prox  SMA                           101          12      1 1  Mid  SMA                                                 1 0   Right       Impression       PSV (cm/s)  EDV (cm/s)  AP (cm)   RAR  Prox ERIK    Diffuse Disease         185          16      1 1        Dist ERIK                            107          16      1 4        Prox   EIA   Normal                  130           0      1 1        Dist EIA 146          25      1 0        Prox Renal  Dissection              185          23           1 29   Left        Impression  PSV (cm/s)  EDV (cm/s)  AP (cm)   RAR  Prox ERIK                       172          19      1 4        Dist ERIK                       137          22      1 5        Prox  EIA   Normal             104           3      1 1        Dist EIA                       156          16      1 1        Prox Renal                     107          10           0 75     CONCLUSION: Impression The abdominal aorta is aneurysmal measuring 3 3 cm proximally, 3 1 cm mid, and 2 5 cm distally (Prior: Px 3 0 cm, Mid 3 2cm, Ds 2 5cm) Known chronic aortic dissection of the juxtarenal aorta to the aortic bifurcation with a false and true lumen  Known chronic aortic dissection into origin of the patent right renal artery with irregular flow patterns noted  Bilateral common iliac and external iliac arteries are patent and normal caliber  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  Prior 1 1 cm 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 measuring 1 1 cm  Prior 1 1cm  The left renal artery is patent  In comparison to the study of 01/13/2020, there is no significant change in the disease process  SIGNATURE: Electronically Signed by: Darryle Cornish, MD, RPVI on 2021-03-09 03:21:27 PM    Vas Lower Limb Arterial Duplex, Complete Bilateral    Result Date: 3/9/2021  Narrative:  [facility] REPORT CLINICAL: Indications:  Aneurysm of Artery of Lower Extremity [I72 4]  Yearly surveillance for progression of disease  Patient denies any claudication symptoms   Operative History: 2011-10-06 Endovascular graft, thoracic aortic aneurysm with coverage of lt subclavian origin Endoluminal graft 2011-09-01 Left Carotid to subclavian bypass (unilateral) Dacron graft 2011-09-01 Left Head & Neck vessel emblization Cardiac Pacemaker Risk Factors The patient has history of HTN, HLD, CAD and previous smoking (quit >10yrs ago)  Clinical Right Pressure:  152/ mm Hg, Left Pressure:  151/ mm Hg  FINDINGS:  Right                  Impression  Waveform  PSV (cm/s)  EDV  AP (cm)  Post  Tibial                       Biphasic                            Ant  Tibial                        Biphasic                            Common Femoral Artery                               118    3           Prox Profunda                                        88    6           Prox SFA                                            112    5           Mid SFA                                              91    8           Dist SFA                                             84    8           Proximal Pop           Ectatic                       78    9      1 1  Distal Pop                                           63    0      1 0  Tibioperoneal                                        63                Dist Post Tibial                                     75    0           Dist  Ant  Tibial                                    64    3           Dist Peroneal                                        73    3            Left                   Impression  Waveform  PSV (cm/s)  EDV  AP (cm)  Post  Tibial                       Biphasic                            Ant   Tibial                        Biphasic                            Common Femoral Artery                               148    5           Prox Profunda                                        95   12           Prox SFA                                            144    0           Mid SFA                                              97   12           Dist SFA                                             97   10           Proximal Pop                                        113   10      0 9  Distal Pop             Ectatic                       64    0      1 1  Tibioperoneal                                        71                Dist Post Tibial                                     74    5           Dist  Ant  Tibial                                    78    7           Dist Peroneal                                        39    0              CONCLUSION: Impression: RIGHT LOWER LIMB: Mild diffuse disease noted throughout the superficial femoral and popliteal arteries, with no significant stenosis  Popliteal artery is ectatic measuring 1 1 cm  Prior 1 1cm  Ankle/Brachial index: 1 3 , Prior 1 4 PVR/ PPG tracings are normal  Metatarsal pressure of 157 mm Hg Great toe pressure of 143 mm Hg, within the healing range Pedal Acceleration time: Dorsal:  130 ms     Plantar: 110 ms  LEFT LOWER LIMB: Mild diffuse disease noted throughout the superficial femoral and popliteal arteries, with no significant stenosis  Popliteal artery is ectatic measuring 1 1 cm  Prior 1 1cm  Ankle/Brachial index: 1 27, Prior 1 23 PVR/ PPG tracings are normal  Metatarsal pressure of 206 mm Hg  non-compressible Great toe pressure of 128 mm Hg, within the healing range Pedal Acceleration time: Dorsal:  60 ms     Plantar: 70 ms  In comparison to the study of 01/13/2020, there is no significant change in the disease process    SIGNATURE: Electronically Signed by: Darryle Cornish, MD, [address] on 2021-03-09 03:20:50 PM

## 2021-03-31 NOTE — TELEPHONE ENCOUNTER
Patient Education        Gastroenteritis in Children: Care Instructions  Your Care Instructions     Gastroenteritis is an illness that may cause nausea, vomiting, and diarrhea. It is sometimes called \"stomach flu. \" It can be caused by bacteria or a virus. Your child should begin to feel better in 1 or 2 days. In the meantime, let your child get plenty of rest and make sure he or she does not get dehydrated. Dehydration occurs when the body loses too much fluid. Follow-up care is a key part of your child's treatment and safety. Be sure to make and go to all appointments, and call your doctor if your child is having problems. It's also a good idea to know your child's test results and keep a list of the medicines your child takes. How can you care for your child at home? · Have your child take medicines exactly as prescribed. Call your doctor if you think your child is having a problem with his or her medicine. You will get more details on the specific medicines your doctor prescribes. · Give your child lots of fluids. This is very important if your child is vomiting or has diarrhea. Give your child sips of water or drinks such as Pedialyte or Infalyte. These drinks contain a mix of salt, sugar, and minerals. You can buy them at drugstores or grocery stores. Give these drinks as long as your child is throwing up or has diarrhea. Do not use them as the only source of liquids or food for more than 12 to 24 hours. · Watch for and treat signs of dehydration, which means the body has lost too much water. As your child becomes dehydrated, thirst increases, and his or her mouth or eyes may feel very dry. Your child may also lack energy and want to be held a lot. Your child may not need to urinate as often as usual.  · Wash your hands after changing diapers and before you touch food. Have your child wash his or her hands after using the toilet and before eating.   · After your child goes 6 hours without vomiting, go Tried calling the patient to schedule his study  LMOM to give us a call back  back to giving him or her a normal, easy-to-digest diet. · Continue to breastfeed, but try it more often and for a shorter time. Give Infalyte or a similar drink between feedings with a dropper, spoon, or bottle. · If your baby is formula-fed, switch to Infalyte. Give:  ? 1 tablespoon of the drink every 10 minutes for the first hour. ? After the first hour, slowly increase how much Infalyte you offer your baby. ? When 6 hours have passed with no vomiting, you may give your child formula again. · Do not give your child over-the-counter antidiarrhea or upset-stomach medicines without talking to your doctor first. Marla Davis not give Pepto-Bismol or other medicines that contain salicylates, a form of aspirin. Do not give aspirin to anyone younger than 20. It has been linked to Reye syndrome, a serious illness. · Make sure your child rests. Keep your child home as long as he or she has a fever. When should you call for help? Call 911 anytime you think your child may need emergency care. For example, call if:    · Your child passes out (loses consciousness).     · Your child is confused, does not know where he or she is, or is extremely sleepy or hard to wake up.     · Your child vomits blood or what looks like coffee grounds.     · Your child passes maroon or very bloody stools. Call your doctor now or seek immediate medical care if:    · Your child has severe belly pain.     · Your child has signs of needing more fluids. These signs include sunken eyes with few tears, a dry mouth with little or no spit, and little or no urine for 6 hours.     · Your child has a new or higher fever.     · Your child's stools are black and tarlike or have streaks of blood.     · Your child has new symptoms, such as a rash, an earache, or a sore throat.     · Symptoms such as vomiting, diarrhea, and belly pain get worse.     · Your child cannot keep down medicine or liquids.    Watch closely for changes in your child's health, and be

## 2021-04-15 ENCOUNTER — TELEPHONE (OUTPATIENT)
Dept: GASTROENTEROLOGY | Facility: CLINIC | Age: 76
End: 2021-04-15

## 2021-05-16 DIAGNOSIS — K21.00 GASTROESOPHAGEAL REFLUX DISEASE WITH ESOPHAGITIS WITHOUT HEMORRHAGE: ICD-10-CM

## 2021-05-17 RX ORDER — OMEPRAZOLE 40 MG/1
CAPSULE, DELAYED RELEASE ORAL
Qty: 30 CAPSULE | Refills: 1 | Status: SHIPPED | OUTPATIENT
Start: 2021-05-17 | End: 2021-07-14

## 2021-07-14 DIAGNOSIS — K21.00 GASTROESOPHAGEAL REFLUX DISEASE WITH ESOPHAGITIS WITHOUT HEMORRHAGE: ICD-10-CM

## 2021-07-14 RX ORDER — OMEPRAZOLE 40 MG/1
CAPSULE, DELAYED RELEASE ORAL
Qty: 30 CAPSULE | Refills: 1 | Status: SHIPPED | OUTPATIENT
Start: 2021-07-14 | End: 2022-01-19 | Stop reason: ALTCHOICE

## 2021-07-19 ENCOUNTER — OFFICE VISIT (OUTPATIENT)
Dept: PSYCHIATRY | Facility: CLINIC | Age: 76
End: 2021-07-19
Payer: MEDICARE

## 2021-07-19 DIAGNOSIS — F41.1 GAD (GENERALIZED ANXIETY DISORDER): ICD-10-CM

## 2021-07-19 DIAGNOSIS — F33.1 MODERATE EPISODE OF RECURRENT MAJOR DEPRESSIVE DISORDER (HCC): Primary | ICD-10-CM

## 2021-07-19 DIAGNOSIS — F33.40 RECURRENT MAJOR DEPRESSIVE DISORDER, IN REMISSION (HCC): ICD-10-CM

## 2021-07-19 PROCEDURE — 99214 OFFICE O/P EST MOD 30 MIN: CPT | Performed by: NURSE PRACTITIONER

## 2021-07-19 RX ORDER — DULOXETIN HYDROCHLORIDE 60 MG/1
60 CAPSULE, DELAYED RELEASE ORAL DAILY
Qty: 90 CAPSULE | Refills: 3 | Status: SHIPPED | OUTPATIENT
Start: 2021-07-19 | End: 2022-01-19 | Stop reason: SDUPTHER

## 2021-07-19 RX ORDER — ALPRAZOLAM 0.25 MG/1
0.25 TABLET ORAL
Qty: 90 TABLET | Refills: 3 | Status: SHIPPED | OUTPATIENT
Start: 2021-07-19 | End: 2022-01-19 | Stop reason: SDUPTHER

## 2021-07-19 NOTE — PSYCH
TREATMENT PLAN (Medication Management Only)        [facility]    Name and Date of Birth:  Sylvester Alvarez  67 y o  1945  Date of Treatment Plan: July 19, 2021  Diagnosis/Diagnoses:    1  Moderate episode of recurrent major depressive disorder ([facility] [address] )    2  DAVIE (generalized anxiety disorder)    3  Recurrent major depressive disorder, in remission [facility])      Strengths/Personal Resources for Self-Care: supportive family, supportive friends  Area/Areas of need (in own words): "  Doing very well"  1  Long Term Goal: " to continue to do well"  Target Date: 6 months - 1/19/2022  Person/Persons responsible for completion of goal: Aaron  2  Short Term Objective (s) - How will we reach this goal?:   A  Provider new recommended medication/dosage changes and/or continue medication(s): continue current medications as prescribed  B   N/A  Target Date: 6 months - 1/19/2022  Person/Persons Responsible for Completion of Goal: Aaron  Progress Towards Goals: stable  Treatment Modality: medication education at every visit  Review due 6 months from date of this plan: 6 months - 1/19/2022  Expected length of service: over 1 year  My Physician/PA/NP and I have developed this plan together and I agree to work on the goals and objectives  I understand the treatment goals that were developed for my treatment

## 2021-07-19 NOTE — PSYCH
MEDICATION MANAGEMENT NOTE        [facility]      Name and Date of Birth:  Joe Paul  67 y o  1945 MRN: [id number]    Date of Visit: July 19, 2021    Reason for Visit:   A medication management appointment for treatment of major depressive disorder and anxiety disorder  SUBJECTIVE:  Patient is a 13-year-old male has history of major depressive disorder and anxiety disorder  He has been a long-time patient of Dr Олег Jackson  Overall, he is feeling fairly well  His depression is under control as well as his anxiety  Most recently, he and his wife had her mother living with them from [facility]  Her mother is now back in [facility] and for now, all is going well  And his wife are getting back into "the routine "  No new clinical issues or concerns were expressed by patient  Has tolerated all meds well  He takes Xanax at bedtime and Cymbalta 60 during the day  No new clinical issues or concerns  Patient will follow-up with me in 6 months or sooner if necessary  He denies suicidal ideation, intent or plan; denies homicidal ideation, intent or plan  He denies auditory hallucinations, denies visual hallucinations, denies overt delusions  He denies any side effects from medications  HPI ROS Appetite Changes and Sleep:     He reports normal sleep, normal appetite, normal energy level, adequate energy level    Current Rating Scores:     None completed today      Review Of Systems:      Constitutional negative   ENT negative   Cardiovascular negative   Respiratory negative   Gastrointestinal negative   Genitourinary negative   Musculoskeletal negative   Integumentary negative   Neurological negative   Endocrine negative   Other Symptoms none, all other systems are negative           Past Medical History:    Past Medical History:   Diagnosis Date    Anxiety     Aortic dissection (HCC)     Depression     Hyperlipidemia     Hypertension     Kidney stone     Renal cyst     Renal cyst     Sleep apnea     Sleep apnea, obstructive      No past medical history pertinent negatives  Past Surgical History:   Procedure Laterality Date    ABDOMINAL AORTIC ANEURYSM REPAIR      CELIAC ARTERY STENT      KIDNEY STONE SURGERY       Allergies   Allergen Reactions    Lorazepam Hyperactivity       Substance Abuse History:    Social History     Substance and Sexual Activity   Alcohol Use No     Social History     Substance and Sexual Activity   Drug Use No       Social History:    Social History     Socioeconomic History    Marital status: /Civil Union     Spouse name: Not on file    Number of children: Not on file    Years of education: Not on file    Highest education level: Not on file   Occupational History    Not on file   Tobacco Use    Smoking status: Former Smoker     Packs/day: 1 00     Years: 24 00     Pack years: 24 00     Types: Cigarettes     Start date: 1959     Quit date: 1983     Years since quittin 2    Smokeless tobacco: Never Used   Substance and Sexual Activity    Alcohol use: No    Drug use: No    Sexual activity: Never   Other Topics Concern    Not on file   Social History Narrative    Not on file     Social Determinants of Health     Financial Resource Strain:     Difficulty of Paying Living Expenses:    Food Insecurity:     Worried About Running Out of Food in the Last Year:     Ran Out of Food in the Last Year:    Transportation Needs:     Lack of Transportation (Medical):      Lack of Transportation (Non-Medical):    Physical Activity:     Days of Exercise per Week:     Minutes of Exercise per Session:    Stress:     Feeling of Stress :    Social Connections:     Frequency of Communication with Friends and Family:     Frequency of Social Gatherings with Friends and Family:     Attends Nondenominational Services:     Active Member of Clubs or Organizations:     Attends Club or Organization Meetings:     Marital Status:    Intimate Partner Violence:     Fear of Current or Ex-Partner:     Emotionally Abused:     Physically Abused:     Sexually Abused:        Family Psychiatric History:     Family History   Problem Relation Age of Onset    Stroke Mother     Prostate cancer Father        History Review: Chart reviewed         OBJECTIVE:     Vital signs in last 24 hours: There were no vitals filed for this visit      Mental Status Evaluation:    Appearance age appropriate, casually dressed   Behavior cooperative, calm   Speech normal rate, normal volume, normal pitch   Mood normal   Affect normal range and intensity, appropriate   Thought Processes organized, goal directed   Associations intact associations   Thought Content no overt delusions   Perceptual Disturbances: no auditory hallucinations, no visual hallucinations   Abnormal Thoughts  Risk Potential Suicidal ideation - None  Homicidal ideation - None  Potential for aggression - No   Orientation oriented to person, place, time/date and situation   Memory recent and remote memory grossly intact   Consciousness alert and awake   Attention Span Concentration Span attention span and concentration are age appropriate   Intellect appears to be of average intelligence   Insight intact and good   Judgement intact and good   Muscle Strength and  Gait normal muscle strength and normal muscle tone, normal gait and normal balance   Motor activity no abnormal movements   Language no difficulty naming common objects, no difficulty repeating a phrase, no difficulty writing a sentence   Fund of Knowledge adequate knowledge of current events  adequate fund of knowledge regarding past history  adequate fund of knowledge regarding vocabulary    Pain none   Pain Scale 0       Laboratory Results: I have personally reviewed all pertinent laboratory/tests results    Recent Labs (last 2 months):   No visits with results within 2 Month(s) from this visit  Latest known visit with results is:   Admission on 02/08/2021, Discharged on 02/08/2021   Component Date Value    Ventricular Rate 02/08/2021 57     Atrial Rate 02/08/2021 57     NV Interval 02/08/2021 244     QRSD Interval 02/08/2021 132     QT Interval 02/08/2021 478     QTC Interval 02/08/2021 465     P Axis 02/08/2021 86     QRS Axis 02/08/2021 55     T Wave Axis 02/08/2021 -9        Suicide/Homicide Risk Assessment:    Risk of Harm to Self:  Based on today's assessment, Shaye Oconnor presents the following risk of harm to self: none    Risk of Harm to Others:  Based on today's assessment, Shaye Oconnor presents the following risk of harm to others: none    The following interventions are recommended: no intervention changes needed    Assessment/Plan:       Diagnoses and all orders for this visit:    Moderate episode of recurrent major depressive disorder (HCC)    DAVIE (generalized anxiety disorder)  -     ALPRAZolam (XANAX) 0 25 mg tablet; Take 1 tablet (0 25 mg total) by mouth daily at bedtime as needed for anxiety    Recurrent major depressive disorder, in remission (HCC)  -     DULoxetine (CYMBALTA) 60 mg delayed release capsule; Take 1 capsule (60 mg total) by mouth daily          Treatment Recommendations/Precautions:      Xanax 0 25 mg HS   Cymbalta 60 mg daily   Follow-up in 6 months or sooner if necessary  Aware of 24 hour and weekend coverage for urgent situations accessed by calling [facility] main practice number    Medications Risks/Benefits      Risks, Benefits And Possible Side Effects Of Medications:    Risks, benefits, and possible side effects of medications explained to Shaye Crewsmakeda and he verbalizes understanding and agreement for treatment      Controlled Medication Discussion:     Shaye Oconnor has been filling controlled prescriptions on time as prescribed according to [address] [facility]    Psychotherapy Provided:     Individual psychotherapy provided: Yes  Medication education provided to Cristal       Treatment Plan:    Completed and signed during the session: Yes - Treatment Plan done but not signed at time of office visit due to:  Plan reviewed in person and verbal consent given due to COVID social distancing    Note Share:        Wilfredo Alberts, [facility] 07/19/21

## 2021-07-22 ENCOUNTER — OFFICE VISIT (OUTPATIENT)
Dept: GASTROENTEROLOGY | Facility: CLINIC | Age: 76
End: 2021-07-22
Payer: MEDICARE

## 2021-07-22 VITALS
WEIGHT: 215 LBS | SYSTOLIC BLOOD PRESSURE: 137 MMHG | HEIGHT: 71 IN | HEART RATE: 65 BPM | DIASTOLIC BLOOD PRESSURE: 56 MMHG | BODY MASS INDEX: 30.1 KG/M2

## 2021-07-22 DIAGNOSIS — Z86.010 HX OF ADENOMATOUS COLONIC POLYPS: ICD-10-CM

## 2021-07-22 DIAGNOSIS — K21.00 GASTROESOPHAGEAL REFLUX DISEASE WITH ESOPHAGITIS WITHOUT HEMORRHAGE: ICD-10-CM

## 2021-07-22 DIAGNOSIS — K62.5 RECTAL BLEEDING: Primary | ICD-10-CM

## 2021-07-22 PROCEDURE — 99213 OFFICE O/P EST LOW 20 MIN: CPT | Performed by: INTERNAL MEDICINE

## 2021-07-22 NOTE — PROGRESS NOTES
[facility] - Outpatient Follow-up Note  Dangelo William  67 y o  male MRN: [id number]  Encounter: [id number]          ASSESSMENT AND PLAN:      1  Rectal bleeding    One month of intermittent rectal bleeding  Last colonoscopy 4 years ago  He does have internal hemorrhoids  Was on Eliquis which is stopped  Will schedule colonoscopy  He does have a history of adenomatous polyps  He is followed by vascular does have aneurysm dilatations  Last bloody bowel movement was about a week ago  - Colonoscopy; Future    2  Gastroesophageal reflux disease with esophagitis without hemorrhage    Controlled  He is now on 40 mg every other day  3  Hx of adenomatous colonic polyps    As above  He will otherwise follow-up in 6 months     ______________________________________________________________________    SUBJECTIVE:    Pleasant 39-year-old gentleman seen in March had a change in bowel habits which have somewhat normalized  However in the interim has had a month of rectal bleeding with bowel motions  Of echo occurred at a period of time restarted on Eliquis the Eliquis has been stopped the reason for starting it is unknown  He does have a lot of peripheral vascular disease and scattered aneurysmal dilations  Followed by vascular on a regular basis  He is avoiding milk in addition  This is also help with his bowel motions  His last colonoscopy was about 4 years ago  REVIEW OF SYSTEMS IS OTHERWISE NEGATIVE        Historical Information   Past Medical History:   Diagnosis Date    Anxiety     Aortic dissection (HCC)     Depression     Hyperlipidemia     Hypertension     Kidney stone     Renal cyst     Renal cyst     Sleep apnea     Sleep apnea, obstructive      Past Surgical History:   Procedure Laterality Date    ABDOMINAL AORTIC ANEURYSM REPAIR      CELIAC ARTERY STENT      KIDNEY STONE SURGERY       Social History   Social History     Substance and Sexual Activity   Alcohol Use No     Social History     Substance and Sexual Activity   Drug Use No     Social History     Tobacco Use   Smoking Status Former Smoker    Packs/day: 1 00    Years: 24 00    Pack years: 24 00    Types: Cigarettes    Start date: 1959   [facility] Quit date: 1983    Years since quittin 2   Smokeless Tobacco Never Used     Family History   Problem Relation Age of Onset    Stroke Mother     Prostate cancer Father        Meds/Allergies       Current Outpatient Medications:     acetaminophen (TYLENOL) 325 mg tablet    ALPRAZolam (XANAX) 0 25 mg tablet    aspirin 81 MG tablet    atorvastatin (LIPITOR) 40 mg tablet    BUTALBITAL-ACETAMINOPHEN PO    Cholecalciferol (VITAMIN D3) 19616 units CAPS    DULoxetine (CYMBALTA) 60 mg delayed release capsule    Eliquis 5 MG    labetalol (NORMODYNE) 100 mg tablet    labetalol (NORMODYNE) 200 mg tablet    omeprazole (PriLOSEC) 40 MG capsule    Allergies   Allergen Reactions    Lorazepam Hyperactivity           Objective     Blood pressure 137/56, pulse 65, height 5' 11" (1 803 m), weight 97 5 kg (215 lb)  Body mass index is 29 99 kg/m²  PHYSICAL EXAM:      General Appearance:   Alert, cooperative, no distress   HEENT:   Normocephalic, atraumatic, anicteric      Neck:  Supple, symmetrical, trachea midline   Lungs:   Clear to auscultation bilaterally; no rales, rhonchi or wheezing; respirations unlabored    Heart[de-identified]   Regular rate and rhythm; no murmur, rub, or gallop  Abdomen:   Soft, non-tender, non-distended; normal bowel sounds; no masses, no organomegaly    Genitalia:   Deferred    Rectal:   Deferred    Extremities:  No cyanosis, clubbing or edema    Pulses:  2+ and symmetric    Skin:  No jaundice, rashes, or lesions    Lymph nodes:  No palpable cervical lymphadenopathy        Lab Results:   No visits with results within 1 Day(s) from this visit     Latest known visit with results is:   Admission on 2021, Discharged on 02/08/2021   Component Date Value    Ventricular Rate 02/08/2021 57     Atrial Rate 02/08/2021 57     MT Interval 02/08/2021 244     QRSD Interval 02/08/2021 132     QT Interval 02/08/2021 478     QTC Interval 02/08/2021 465     P Axis 02/08/2021 86     QRS Axis 02/08/2021 55     T Wave Axis 02/08/2021 -9          Radiology Results:   No results found

## 2021-08-16 ENCOUNTER — TELEPHONE (OUTPATIENT)
Dept: GASTROENTEROLOGY | Facility: CLINIC | Age: 76
End: 2021-08-16

## 2021-08-23 ENCOUNTER — ANESTHESIA (OUTPATIENT)
Dept: GASTROENTEROLOGY | Facility: AMBULATORY SURGERY CENTER | Age: 76
End: 2021-08-23

## 2021-08-23 ENCOUNTER — HOSPITAL ENCOUNTER (OUTPATIENT)
Dept: GASTROENTEROLOGY | Facility: AMBULATORY SURGERY CENTER | Age: 76
Discharge: HOME | End: 2021-08-23
Payer: MEDICARE

## 2021-08-23 ENCOUNTER — ANESTHESIA EVENT (OUTPATIENT)
Dept: GASTROENTEROLOGY | Facility: AMBULATORY SURGERY CENTER | Age: 76
End: 2021-08-23

## 2021-08-23 VITALS
OXYGEN SATURATION: 97 % | HEART RATE: 65 BPM | TEMPERATURE: 96.1 F | DIASTOLIC BLOOD PRESSURE: 68 MMHG | BODY MASS INDEX: 30.1 KG/M2 | WEIGHT: 215 LBS | HEIGHT: 71 IN | RESPIRATION RATE: 18 BRPM | SYSTOLIC BLOOD PRESSURE: 125 MMHG

## 2021-08-23 DIAGNOSIS — K62.5 RECTAL BLEEDING: ICD-10-CM

## 2021-08-23 PROCEDURE — 45385 COLONOSCOPY W/LESION REMOVAL: CPT | Performed by: INTERNAL MEDICINE

## 2021-08-23 PROCEDURE — 88305 TISSUE EXAM BY PATHOLOGIST: CPT | Performed by: PATHOLOGY

## 2021-08-23 PROCEDURE — 45380 COLONOSCOPY AND BIOPSY: CPT | Performed by: INTERNAL MEDICINE

## 2021-08-23 PROCEDURE — 99100 ANES PT EXTEME AGE<1 YR&>70: CPT | Performed by: NURSE ANESTHETIST, CERTIFIED REGISTERED

## 2021-08-23 PROCEDURE — 00811 ANES LWR INTST NDSC NOS: CPT | Performed by: NURSE ANESTHETIST, CERTIFIED REGISTERED

## 2021-08-23 RX ORDER — PROPOFOL 10 MG/ML
INJECTION, EMULSION INTRAVENOUS AS NEEDED
Status: DISCONTINUED | OUTPATIENT
Start: 2021-08-23 | End: 2021-08-23

## 2021-08-23 RX ORDER — SODIUM CHLORIDE 9 MG/ML
20 INJECTION, SOLUTION INTRAVENOUS CONTINUOUS
Status: DISCONTINUED | OUTPATIENT
Start: 2021-08-23 | End: 2021-08-27 | Stop reason: HOSPADM

## 2021-08-23 RX ORDER — SODIUM CHLORIDE 9 MG/ML
30 INJECTION, SOLUTION INTRAVENOUS CONTINUOUS
Status: DISCONTINUED | OUTPATIENT
Start: 2021-08-23 | End: 2021-08-27 | Stop reason: HOSPADM

## 2021-08-23 RX ADMIN — PROPOFOL 30 MG: 10 INJECTION, EMULSION INTRAVENOUS at 07:58

## 2021-08-23 RX ADMIN — SODIUM CHLORIDE: 9 INJECTION, SOLUTION INTRAVENOUS at 07:52

## 2021-08-23 RX ADMIN — PROPOFOL 30 MG: 10 INJECTION, EMULSION INTRAVENOUS at 08:09

## 2021-08-23 RX ADMIN — PROPOFOL 50 MG: 10 INJECTION, EMULSION INTRAVENOUS at 08:00

## 2021-08-23 RX ADMIN — PROPOFOL 50 MG: 10 INJECTION, EMULSION INTRAVENOUS at 08:05

## 2021-08-23 RX ADMIN — PROPOFOL 120 MG: 10 INJECTION, EMULSION INTRAVENOUS at 07:56

## 2021-08-23 RX ADMIN — PROPOFOL 20 MG: 10 INJECTION, EMULSION INTRAVENOUS at 08:28

## 2021-08-23 RX ADMIN — PROPOFOL 30 MG: 10 INJECTION, EMULSION INTRAVENOUS at 08:22

## 2021-08-23 RX ADMIN — PROPOFOL 50 MG: 10 INJECTION, EMULSION INTRAVENOUS at 08:02

## 2021-08-23 RX ADMIN — PROPOFOL 20 MG: 10 INJECTION, EMULSION INTRAVENOUS at 08:14

## 2021-08-23 NOTE — H&P
History and Physical - [facility]  Kameron Sinha  67 y o  male MRN: [id number]                  HPI: Kameron Sinha  is a 67y o  year old male who presents for colonoscopy      REVIEW OF SYSTEMS: Per the HPI, and otherwise unremarkable      Historical Information   Past Medical History:   Diagnosis Date    Anxiety     Aortic dissection ([facility] [address] )     Brain injury ([facility] [address] )     Hx of fall on ice in winter 2021- treated at [facility] notices occasional sharp pains in head with cough     Colon polyp     COPD (chronic obstructive pulmonary disease) (HCC)     PCP states mild    CPAP (continuous positive airway pressure) dependence     Depression     Hyperlipidemia     Hypertension     Kidney stone     Rectal bleeding     hx of when he was taking Eliquis    Renal cyst     Renal cyst     Sleep apnea     Sleep apnea, obstructive      Past Surgical History:   Procedure Laterality Date    ABDOMINAL AORTIC ANEURYSM REPAIR      about 20 years ago    CELIAC ARTERY STENT      COLONOSCOPY     [address]       Social History   Social History     Substance and Sexual Activity   Alcohol Use No     Social History     Substance and Sexual Activity   Drug Use No     Social History     Tobacco Use   Smoking Status Former Smoker    Packs/day: 1 00    Years: 24 00    Pack years: 24 00    Types: Cigarettes    Start date: 1959   [facility] Quit date: 1983    Years since quittin 3   Smokeless Tobacco Never Used     Family History   Problem Relation Age of Onset    Stroke Mother     Prostate cancer Father        Meds/Allergies       Current Outpatient Medications:     DULoxetine (CYMBALTA) 60 mg delayed release capsule    labetalol (NORMODYNE) 200 mg tablet    acetaminophen (TYLENOL) 325 mg tablet    ALPRAZolam (XANAX) 0 25 mg tablet    aspirin 81 MG tablet    atorvastatin (LIPITOR) 40 mg tablet    BUTALBITAL-ACETAMINOPHEN PO    Cholecalciferol (VITAMIN D3) 60304 units CAPS    Eliquis 5 MG    omeprazole (PriLOSEC) 40 MG capsule    Current Facility-Administered Medications:     sodium chloride 0 9 % infusion, 30 mL/hr, Intravenous, Continuous    sodium chloride 0 9 % infusion, 20 mL/hr, Intravenous, Continuous    Allergies   Allergen Reactions    Lorazepam Hyperactivity       Objective     /71   Pulse 71   Temp (!) 96 1 °F (35 6 °C) (Skin)   Resp 18   Ht 5' 11" (1 803 m)   Wt 97 5 kg (215 lb)   SpO2 96%   BMI 29 99 kg/m²       PHYSICAL EXAM    Gen: NAD  Head: NCAT  CV: RRR  CHEST: Clear  ABD: soft, NT/ND  EXT: no edema      ASSESSMENT/PLAN:  This is a 67y o  year old male here for colonoscopy, and he is stable and optimized for his procedure

## 2021-08-23 NOTE — DISCHARGE INSTRUCTIONS
Colonoscopy   WHAT YOU NEED TO KNOW:   A colonoscopy is a procedure to examine the inside of your colon (intestine) with a scope  Polyps or tissue growths may have been removed during your colonoscopy  It is normal to feel bloated and to have some abdominal discomfort  You should be passing gas  If you have hemorrhoids or you had polyps removed, you may have a small amount of bleeding  DISCHARGE INSTRUCTIONS:   Seek care immediately if:    You have sudden, severe abdominal pain   You have problems swallowing   You have a large amount of black, sticky bowel movements or blood in your bowel movements   You have sudden trouble breathing   You feel weak, lightheaded, or faint or your heart beats faster than normal for you  Contact your healthcare provider if:    You have a fever and chills   You have nausea or are vomiting   Your abdomen is bloated or feels full and hard   You have abdominal pain   You have black, sticky bowel movements or blood in your bowel movements   You have not had a bowel movement for 3 days after your procedure   You have rash or hives   You have questions or concerns about your procedure  Activity:    Do not lift, strain, or run for 24 hours after your procedure   Rest after your procedure  You have been given medicine to relax you  Do not drive or make important decisions until the day after your procedure  Return to your normal activity as directed   Relieve gas and discomfort from bloating by lying on your right side with a heating pad on your abdomen  You may need to take short walks to help the gas move out  Eat small meals until bloating is relieved  Follow up with your healthcare provider as directed: Write down your questions so you remember to ask them during your visits  If you take a blood thinner, please review the specific instructions from your endoscopist about when you should resume it   These can be found in the Recommendation and Your Medication list sections of this After Visit Summary  Sleep Apnea   WHAT YOU NEED TO KNOW:   What is sleep apnea? Sleep apnea is a condition that causes you to stop breathing for seconds or minutes at a time  This can happen many times during the night  What are the types of sleep apnea? · Obstructive sleep apnea (AMISHA)  is the most common kind  The muscles and tissues around your throat relax and block air from passing through  Obesity, use of alcohol or cigarettes, or a family history are common causes  AMISHA may increase your risk for complications after surgery  · Central sleep apnea (CSA)  means your brain does not send signals to the muscles that control breathing  You do not take a breath even though your airway is open  Common causes include medical conditions such as heart failure, being older than 40, or use of opioids  · Complex (or mixed) sleep apnea  means you have both obstructive and central sleep apnea  What are the signs and symptoms of sleep apnea? · Loud snoring or long pauses in breathing    · Feeling sleepy, slow, and tired during the day    · Snorting, gasping, or choking while you sleep, and waking up suddenly because of these    · Feeling irritable during the day    · Dry mouth or a headache in the mornings    · Heavy night sweating    · A hard time thinking, remembering things, or focusing on your tasks the following day    How is sleep apnea diagnosed? Your healthcare provider will ask about your signs and symptoms  Tell him or her about your medical history and what medicines you take  Your provider may ask if you smoke or if anyone in your family has sleep apnea  he or she may ask the person who sleeps beside you about your signs  You may need any of the following:  · A home sleep test  measures your heart rate, blood oxygen level, and breathing patterns  You wear a device on your finger while you sleep      · A sleep study at a facility may be needed  Your blood oxygen levels, movements, and heart, lung, and brain activity are monitored  How is sleep apnea treated? Treatment depends on the kind of apnea you have:  · A mouth device  may be needed if you have mild sleep apnea  These are designed to keep your throat open  Ask your dentist or healthcare provider about the best mouth device for you  · A machine  may be used to help you get more air during sleep  A mask may be placed over your nose and mouth, or just your nose  The mask is hooked to the machine  You will get air through the mask  ? A continuous positive airway pressure (CPAP) machine  is used to keep your airway open during sleep  The machine blows a gentle stream of air into the mask when you breathe  This helps keep your airway open so you can breathe more regularly  Extra oxygen may be given through the machine  ? A bilevel positive airway pressure (BiPAP) machine  gives air but lowers the pressure when you breathe out  ? An adaptive servo-ventilator (ASV)  is a machine that learns your usual breathing pattern  Then, it uses pressure to give you air and prevent stops in your breathing  · Surgery  to expand your airway or remove extra tissues may be needed  Surgery is usually only considered if other treatments do not work  How can I manage or prevent sleep apnea? · Reach and maintain a healthy weight  Ask your healthcare provider what a healthy weight is for you  Ask him or her to help you create a safe weight loss plan if you are overweight  Even a small goal of a 10% weight loss can improve your symptoms  · Do not smoke  Nicotine and other chemicals in cigarettes and cigars can cause lung damage  Ask your healthcare provider for information if you currently smoke and need help to quit  E-cigarettes or smokeless tobacco still contain nicotine  Talk to your healthcare provider before you use these products      · Do not drink alcohol or take sedative medicine before you go to sleep  Alcohol and sedatives can relax the muscles and tissues around your throat  This can block the airflow to your lungs  · Sleep on your side or use pillows designed to prevent sleep apnea  This prevents your tongue or other tissues from blocking your throat  You can also raise the head of your bed  Call your local emergency number (911 in the [address]) if:   · You have chest pain or trouble breathing  When should I call my doctor? · You have new or worsening signs or symptoms  · You have questions or concerns about your condition or care  CARE AGREEMENT:   You have the right to help plan your care  Learn about your health condition and how it may be treated  Discuss treatment options with your healthcare providers to decide what care you want to receive  You always have the right to refuse treatment  The above information is an  only  It is not intended as medical advice for individual conditions or treatments  Talk to your doctor, nurse or pharmacist before following any medical regimen to see if it is safe and effective for you  © Copyright [address] 2021 Information is for End User's use only and may not be sold, redistributed or otherwise used for commercial purposes  All illustrations and images included in CareNotes® are the copyrighted property of [employer]  or [facility] Fiber Diet   WHAT YOU NEED TO KNOW:   What is a high-fiber diet? A high-fiber diet includes foods that have a high amount of fiber  Fiber is the part of fruits, vegetables, and grains that is not broken down by your body  Fiber keeps your bowel movements regular  Fiber can also help lower your cholesterol level, control blood sugar in people with diabetes, and relieve constipation  Fiber can also help you control your weight because it helps you feel full faster  Most adults should eat 25 to 35 grams of fiber each day   Talk to your dietitian or healthcare provider about the amount of fiber you need  What foods are good sources of fiber? · Foods with at least 4 grams of fiber per serving:      ? ? to ½ cup of high-fiber cereal (check the nutrition label on the box)    ? ½ cup of blackberries or raspberries    ? 4 dried prunes    ? 1 cooked artichoke    ? ½ cup of cooked legumes, such as lentils, or red, kidney, and chu beans    · Foods with 1 to 3 grams of fiber per serving:      ? 1 slice of whole-wheat, pumpernickel, or rye bread    ? ½ cup of cooked brown rice    ? 4 whole-wheat crackers    ? 1 cup of oatmeal    ? ½ cup of cereal with 1 to 3 grams of fiber per serving (check the nutrition label on the box)    ? 1 small piece of fruit, such as an apple, banana, pear, kiwi, or orange    ? 3 dates    ? ½ cup of canned apricots, fruit cocktail, peaches, or pears    ? ½ cup of raw or cooked vegetables, such as carrots, cauliflower, cabbage, spinach, squash, or corn  What are some ways that I can increase fiber in my diet? · Choose brown or wild rice instead of white rice  · Use whole wheat flour in recipes instead of white or all-purpose flour  · Add beans and peas to casseroles or soups  · Choose fresh fruit and vegetables with peels or skins on instead of juices  What other guidelines should I follow? · Add fiber to your diet slowly  You may have abdominal discomfort, bloating, and gas if you add fiber to your diet too quickly  · Drink plenty of liquids as you add fiber to your diet  You may have nausea or develop constipation if you do not drink enough water  Ask how much liquid to drink each day and which liquids are best for you  CARE AGREEMENT:   You have the right to help plan your care  Discuss treatment options with your healthcare provider to decide what care you want to receive  You always have the right to refuse treatment  The above information is an  only   It is not intended as medical advice for individual conditions or treatments  Talk to your doctor, nurse or pharmacist before following any medical regimen to see if it is safe and effective for you  © Copyright [employer] 2021 Information is for End User's use only and may not be sold, redistributed or otherwise used for commercial purposes  All illustrations and images included in CareNotes® are the copyrighted property of [employer]  or [employer] Vel[employer]   Diverticulosis   WHAT YOU NEED TO KNOW:   What is diverticulosis? Diverticulosis is a condition that causes small pockets called diverticula to form in your intestine  These pockets make it difficult for bowel movements to pass through your digestive system  What causes diverticulosis? Diverticula form when muscles have to work hard to move bowel movements through the intestine  The force causes bulges to form at weak areas in the intestine  This may happen if you eat foods that are low in fiber  Fiber helps give your bowel movements more bulk so they are larger and easier to move through your colon  The following may increase your risk of diverticulosis:  · A history of constipation    · Age 36 or older    · Obesity    · Lack of exercise    What are the signs and symptoms of diverticulosis? Diverticulosis usually does not cause any signs or symptoms  It may cause any of the following in some people:  · Pain or discomfort in your lower abdomen    · Abdominal bloating    · Constipation or diarrhea    How is diverticulosis diagnosed? Your healthcare provider will examine you and ask about your bowel movements, diet, and symptoms  He or she will also ask about any medical conditions you have or medicines you take  You may need any of the following:  · Blood tests  may be done to check for signs of inflammation  · A barium enema  is an x-ray of your colon that may show diverticula  A tube is put into your anus, and a liquid called barium is put through the tube   Barium is used so that healthcare providers can see your colon more clearly  · Flexible sigmoidoscopy  is a test to look for any changes in your lower intestines and rectum  It may also show the cause of any bleeding or pain  A soft, bendable tube with a light on the end will be put into your anus  It will then be moved forward into your intestine  · A colonoscopy  is used to look at your whole colon  A scope (long bendable tube with a light on the end) is used to take pictures  This test may show diverticula  · A CT scan , or CAT scan, may show diverticula  You may be given contrast liquid before the scan  Tell the healthcare provider if you have ever had an allergic reaction to contrast liquid  How is diverticulosis managed? The goal of treatment is to manage any symptoms you have and prevent other problems such as diverticulitis  Diverticulitis is swelling or infection of the diverticula  Your healthcare provider may recommend any of the following:  · Eat a variety of high-fiber foods  High-fiber foods help you have regular bowel movements  High-fiber foods include cooked beans, fruits, vegetables, and some cereals  Most adults need 25 to 35 grams of fiber each day  Your healthcare provider may recommend that you have more  Ask your healthcare provider how much fiber you need  Increase fiber slowly  You may have abdominal discomfort, bloating, and gas if you add fiber to your diet too quickly  You may need to take a fiber supplement if you are not getting enough fiber from food  · Medicines  to soften your bowel movements may be given  You may also need medicines to treat symptoms such as bloating and pain  · Drink liquids as directed  You may need to drink 2 to 3 liters (8 to 12 cups) of liquids every day  Ask your healthcare provider how much liquid to drink each day and which liquids are best for you  · Apply heat  on your abdomen for 20 to 30 minutes every 2 hours for as many days as directed   Heat helps decrease pain and muscle spasms  How can I help prevent diverticulitis or other symptoms? The following may help decrease your risk for diverticulitis or symptoms, such as bleeding  Talk to your provider about these or other things you can do to prevent problems that may occur with diverticulosis  · Exercise regularly  Ask your healthcare provider about the best exercise plan for you  Exercise can help you have regular bowel movements  Get 30 minutes of exercise on most days of the week  · Maintain a healthy weight  Ask your healthcare provider how much you should weigh  Ask him or her to help you create a weight loss plan if you are overweight  · Do not smoke  Nicotine and other chemicals in cigarettes increase your risk for diverticulitis  Ask your healthcare provider for information if you currently smoke and need help to quit  E-cigarettes or smokeless tobacco still contain nicotine  Talk to your healthcare provider before you use these products  · Ask your healthcare provider if it is safe to take NSAIDs  NSAIDs may increase your risk of diverticulitis  When should I seek immediate care? · You have severe pain on the left side of your lower abdomen  · Your bowel movements are bright or dark red  When should I contact my healthcare provider? · You have a fever and chills  · You feel dizzy or lightheaded  · You have nausea, or you are vomiting  · You have a change in your bowel movements  · You have questions or concerns about your condition or care  CARE AGREEMENT:   You have the right to help plan your care  Learn about your health condition and how it may be treated  Discuss treatment options with your healthcare providers to decide what care you want to receive  You always have the right to refuse treatment  The above information is an  only  It is not intended as medical advice for individual conditions or treatments   Talk to your doctor, nurse or pharmacist before following any medical regimen to see if it is safe and effective for you  © Copyright [employer] 2021 Information is for End User's use only and may not be sold, redistributed or otherwise used for commercial purposes  All illustrations and images included in CareNotes® are the copyrighted property of [employer]  or Paco Ross  Hemorrhoids   WHAT YOU NEED TO KNOW:   What are hemorrhoids? Hemorrhoids are swollen blood vessels inside your rectum (internal hemorrhoids) or on your anus (external hemorrhoids)  Sometimes a hemorrhoid may prolapse  This means it extends out of your anus  What increases my risk for hemorrhoids? · Pregnancy or obesity    · Straining or sitting for a long time during bowel movements    · Liver disease    · Weak muscles around the anus caused by older age, rectal surgery, or anal intercourse    · A lack of physical activity    · Chronic diarrhea or constipation    · A low-fiber diet    What are the signs and symptoms of hemorrhoids? · Pain or itching around your anus or inside your rectum    · Swelling or bumps around your anus    · Bright red blood in your bowel movement, on the toilet paper, or in the toilet bowl    · Tissue bulging out of your anus (prolapsed hemorrhoids)    · Incontinence (poor control over urine or bowel movements)    How are hemorrhoids diagnosed? Your healthcare provider will ask about your symptoms, the foods you eat, and your bowel movements  He or she will examine your anus for external hemorrhoids  You may need the following:  · A digital rectal exam  is a test to check for hemorrhoids  Your healthcare provider will put a gloved finger inside your anus to feel for the hemorrhoids  · An anoscopy  is a test that uses a scope (small tube with a light and camera on the end) to look at your hemorrhoids  How are hemorrhoids treated? Treatment will depend on your symptoms   You may need any of the following:  · Medicines  can help decrease pain and swelling, and soften your bowel movement  The medicine may be a pill, pad, cream, or ointment  · Procedures  may be used to shrink or remove your hemorrhoid  Examples include rubber-band ligation, sclerotherapy, and photocoagulation  These procedures may be done in your healthcare provider's office  Ask your healthcare provider for more information about these procedures  · Surgery  may be needed to shrink or remove your hemorrhoids  How can I manage my symptoms? · Apply ice on your anus for 15 to 20 minutes every hour or as directed  Use an ice pack, or put crushed ice in a plastic bag  Cover it with a towel before you apply it to your anus  Ice helps prevent tissue damage and decreases swelling and pain  · Take a sitz bath  Fill a bathtub with 4 to 6 inches of warm water  You may also use a sitz bath pan that fits inside a toilet bowl  Sit in the sitz bath for 15 minutes  Do this 3 times a day, and after each bowel movement  The warm water can help decrease pain and swelling  · Keep your anal area clean  Gently wash the area with warm water daily  Soap may irritate the area  After a bowel movement, wipe with moist towelettes or wet toilet paper  Dry toilet paper can irritate the area  How can I help prevent hemorrhoids? · Do not strain to have a bowel movement  Do not sit on the toilet too long  These actions can increase pressure on the tissues in your rectum and anus  · Drink plenty of liquids  Liquids can help prevent constipation  Ask how much liquid to drink each day and which liquids are best for you  · Eat a variety of high-fiber foods  Examples include fruits, vegetables, and whole grains  Ask your healthcare provider how much fiber you need each day  You may need to take a fiber supplement  · Exercise as directed  Exercise, such as walking, may make it easier to have a bowel movement   Ask your healthcare provider to help you create an exercise plan  · Do not have anal sex  Anal sex can weaken the skin around your rectum and anus  · Avoid heavy lifting  This can cause straining and increase your risk for another hemorrhoid  When should I seek immediate care? · You have severe pain in your rectum or around your anus  · You have severe pain in your abdomen and you are vomiting  · You have bleeding from your anus that soaks through your underwear  When should I contact my healthcare provider? · You have frequent and painful bowel movements  · Your hemorrhoid looks or feels more swollen than usual      · You do not have a bowel movement for 2 days or more  · You see or feel tissue coming through your anus  · You have questions or concerns about your condition or care  CARE AGREEMENT:   You have the right to help plan your care  Learn about your health condition and how it may be treated  Discuss treatment options with your healthcare providers to decide what care you want to receive  You always have the right to refuse treatment  The above information is an  only  It is not intended as medical advice for individual conditions or treatments  Talk to your doctor, nurse or pharmacist before following any medical regimen to see if it is safe and effective for you  © Copyright [employer] 2021 Information is for End User's use only and may not be sold, redistributed or otherwise used for commercial purposes  All illustrations and images included in CareNotes® are the copyrighted property of [employer]  or Paco Ross  Colorectal Polyps   WHAT YOU NEED TO KNOW:   What are colorectal polyps? Colorectal polyps are small growths of tissue in the lining of the colon and rectum  Most polyps are usually benign (not cancer)  Certain types of polyps, called adenomatous polyps, may turn into cancer  What increases my risk for colorectal polyps?   The exact cause of colorectal polyps is unknown  The following may increase your risk:  · Older age    · Foods high in fat and low in fiber    · Family history of polyps    · Intestinal diseases, such as Crohn disease or ulcerative colitis    · Smoking cigarettes or drinking alcohol    · Lack of physical activity, such as exercise    · Obesity    What are the signs and symptoms of colorectal polyps? · Blood in your bowel movement or bleeding from the rectum    · Change in bowel movement habits, such as diarrhea or constipation    · Abdominal pain    How are colorectal polyps diagnosed? You should have fecal blood screening 1 time each year for colorectal disease if you are older than 50 years  You should be screened earlier if you have an intestinal disease or a family history of polyps or colorectal cancer  During this screening, a sample of your bowel movement is checked for blood, which may be an early sign of colorectal polyps or cancer  You may also need any of the following tests:  · A digital rectal exam  means your healthcare provider will use a finger to check for polyps  · A barium enema  is an x-ray of the colon  A tube is put into your anus, and a liquid called barium is put through the tube  Barium is used so that healthcare providers can see your colon better on the x-ray film  · A virtual colonoscopy  is a CT scan that takes pictures of the inside of your colon and rectum  A small, flexible tube is put into your rectum and air or carbon dioxide (gas) is used to expand your colon  This lets healthcare providers clearly see your colon and any polyps on a monitor  · Colonoscopy or sigmoidoscopy  are procedures to help your healthcare provider see the inside of your colon  He or she will use a flexible tube with a small light and camera on the end  During a sigmoidoscopy, your healthcare provider will only look at rectum and lower colon   During a colonoscopy, he or she will look at the full length of your colon  A small amount of tissue may be removed from your colon for tests  How are colorectal polyps treated? Small, benign polyps may not need treatment  Your healthcare provider will check the polyp over time to make sure it is not changing  Polyps that are cancer may be removed with one of the following:  · A polypectomy  is a minimally invasive procedure to remove a polyp during a colonoscopy or sigmoidoscopy  Your healthcare provider may need to remove the polyp with a laparoscope  Laparoscopy is done by inserting a small, flexible scope into incisions made on your abdomen  · Surgery  may be needed to remove large or deep polyps that cannot be removed in a polypectomy  Tissues or lymph nodes near a polyp may also be removed  This helps stop cancer from spreading  What can I do to lower my risk for colorectal polyps? · Eat a variety of healthy foods  Healthy foods include fruit, vegetables, whole-grain breads, low-fat dairy products, beans, lean meat, and fish  Ask if you need to be on a special diet  · Maintain a healthy weight  Ask your healthcare provider what a healthy weight is for you  Ask him or her to help with a weight loss plan if you are overweight  · Exercise as directed  Begin to exercise slowly and do more as you get stronger  Talk with your healthcare provider before you start an exercise program          · Limit alcohol  Your risk for polyps increases the more you drink  · Do not smoke  Nicotine and other chemicals in cigarettes and cigars increase your risk for polyps  Ask your healthcare provider for information if you currently smoke and need help to quit  E-cigarettes or smokeless tobacco still contain nicotine  Talk to your healthcare provider before you use these products  Where can I find more information?    · Vania Mcdaniel ([facility]) [address], [place] [address]  Phone: [phone]  Web Address: www digestive  niddk nih gov    Call your local emergency number (911 in the [address]) if:   · You have sudden shortness of breath  · You have a fast heart rate, fast breathing, or are too dizzy to stand up  When should I seek immediate care? · You have severe abdominal pain  · You see blood in your bowel movement  When should I call my doctor? · You have a fever  · You have chills, a cough, or feel weak and achy  · You have abdominal pain that does not go away or gets worse after you take medicine  · Your abdomen is swollen  · You are losing weight without trying  · You have questions or concerns about your condition or care  CARE AGREEMENT:   You have the right to help plan your care  Learn about your health condition and how it may be treated  Discuss treatment options with your healthcare providers to decide what care you want to receive  You always have the right to refuse treatment  The above information is an  only  It is not intended as medical advice for individual conditions or treatments  Talk to your doctor, nurse or pharmacist before following any medical regimen to see if it is safe and effective for you  © Copyright [facility] 2021 Information is for End User's use only and may not be sold, redistributed or otherwise used for commercial purposes   All illustrations and images included in CareNotes® are the copyrighted property of A D A M , Inc  or [facility]

## 2021-08-23 NOTE — ANESTHESIA POSTPROCEDURE EVALUATION
Post-Op Assessment Note    CV Status:  Stable  Pain Score: 0    Pain management: adequate     Mental Status:  Alert and awake   Hydration Status:  Stable   PONV Controlled:  None   Airway Patency:  Patent   Two or more mitigation strategies used for obstructive sleep apnea   Post Op Vitals Reviewed: Yes      Staff: CRNA         No complications documented      /63 (08/23/21 0838)    Temp     Pulse 64 (08/23/21 0838)   Resp 16 (08/23/21 0838)    SpO2 98 % (08/23/21 0838)

## 2021-10-19 ENCOUNTER — APPOINTMENT (OUTPATIENT)
Dept: LAB | Facility: CLINIC | Age: 76
End: 2021-10-19
Payer: MEDICARE

## 2021-10-19 DIAGNOSIS — R79.9 ABNORMAL BLOOD CHEMISTRY: ICD-10-CM

## 2021-10-19 DIAGNOSIS — R79.89 HYPOURICEMIA: ICD-10-CM

## 2021-10-19 DIAGNOSIS — Z86.718 HISTORY OF DVT (DEEP VEIN THROMBOSIS): ICD-10-CM

## 2021-10-19 DIAGNOSIS — K76.0 FATTY METAMORPHOSIS OF LIVER: ICD-10-CM

## 2021-10-19 LAB
ANION GAP SERPL CALCULATED.3IONS-SCNC: 11 MMOL/L (ref 4–13)
BUN SERPL-MCNC: 19 MG/DL (ref 5–25)
CALCIUM SERPL-MCNC: 9 MG/DL (ref 8.3–10.1)
CHLORIDE SERPL-SCNC: 105 MMOL/L (ref 100–108)
CO2 SERPL-SCNC: 26 MMOL/L (ref 21–32)
CREAT SERPL-MCNC: 1.25 MG/DL (ref 0.6–1.3)
GFR SERPL CREATININE-BSD FRML MDRD: 55 ML/MIN/1.73SQ M
GLUCOSE SERPL-MCNC: 111 MG/DL (ref 65–140)
POTASSIUM SERPL-SCNC: 4 MMOL/L (ref 3.5–5.3)
SODIUM SERPL-SCNC: 142 MMOL/L (ref 136–145)

## 2021-10-19 PROCEDURE — 36415 COLL VENOUS BLD VENIPUNCTURE: CPT

## 2021-10-19 PROCEDURE — 80048 BASIC METABOLIC PNL TOTAL CA: CPT

## 2021-10-20 ENCOUNTER — APPOINTMENT (OUTPATIENT)
Dept: LAB | Age: 76
End: 2021-10-20
Payer: MEDICARE

## 2021-10-20 ENCOUNTER — HOSPITAL ENCOUNTER (OUTPATIENT)
Dept: RADIOLOGY | Age: 76
Discharge: HOME | End: 2021-10-20
Payer: MEDICARE

## 2021-10-20 DIAGNOSIS — I62.03 NONTRAUMATIC CHRONIC SUBDURAL HEMORRHAGE (HCC): ICD-10-CM

## 2021-10-20 DIAGNOSIS — G44.89 OTHER HEADACHE SYNDROME: ICD-10-CM

## 2021-10-20 DIAGNOSIS — K76.0 FATTY LIVER: ICD-10-CM

## 2021-10-20 DIAGNOSIS — G44.221 CHRONIC TENSION-TYPE HEADACHE, INTRACTABLE: ICD-10-CM

## 2021-10-20 DIAGNOSIS — Z91.81 HISTORY OF FALLING: ICD-10-CM

## 2021-10-20 DIAGNOSIS — D64.9 ANEMIA, UNSPECIFIED TYPE: ICD-10-CM

## 2021-10-20 DIAGNOSIS — R53.83 FATIGUE, UNSPECIFIED TYPE: ICD-10-CM

## 2021-10-20 DIAGNOSIS — Z12.5 SPECIAL SCREENING FOR MALIGNANT NEOPLASM OF PROSTATE: ICD-10-CM

## 2021-10-20 PROCEDURE — 70470 CT HEAD/BRAIN W/O & W/DYE: CPT

## 2021-10-20 PROCEDURE — G1004 CDSM NDSC: HCPCS

## 2021-10-20 RX ADMIN — IOHEXOL 85 ML: 350 INJECTION, SOLUTION INTRAVENOUS at 13:37

## 2021-10-21 ENCOUNTER — APPOINTMENT (OUTPATIENT)
Dept: LAB | Facility: CLINIC | Age: 76
End: 2021-10-21
Payer: MEDICARE

## 2021-10-21 DIAGNOSIS — R53.83 FATIGUE, UNSPECIFIED TYPE: ICD-10-CM

## 2021-10-21 DIAGNOSIS — R63.4 LOSS OF WEIGHT: ICD-10-CM

## 2021-10-21 DIAGNOSIS — D64.9 ANEMIA, UNSPECIFIED TYPE: ICD-10-CM

## 2021-10-21 DIAGNOSIS — Z12.5 SPECIAL SCREENING FOR MALIGNANT NEOPLASM OF PROSTATE: ICD-10-CM

## 2021-10-21 DIAGNOSIS — K76.0 FATTY LIVER: ICD-10-CM

## 2021-10-21 LAB
25(OH)D3 SERPL-MCNC: 45.3 NG/ML (ref 30–100)
ALBUMIN SERPL BCP-MCNC: 3.7 G/DL (ref 3.5–5)
ALP SERPL-CCNC: 121 U/L (ref 46–116)
ALT SERPL W P-5'-P-CCNC: 26 U/L (ref 12–78)
ANION GAP SERPL CALCULATED.3IONS-SCNC: 9 MMOL/L (ref 4–13)
AST SERPL W P-5'-P-CCNC: 23 U/L (ref 5–45)
BASOPHILS # BLD AUTO: 0.06 THOUSANDS/ΜL (ref 0–0.1)
BASOPHILS NFR BLD AUTO: 1 % (ref 0–1)
BILIRUB SERPL-MCNC: 0.77 MG/DL (ref 0.2–1)
BUN SERPL-MCNC: 17 MG/DL (ref 5–25)
CALCIUM SERPL-MCNC: 9.2 MG/DL (ref 8.3–10.1)
CHLORIDE SERPL-SCNC: 106 MMOL/L (ref 100–108)
CHOLEST SERPL-MCNC: 146 MG/DL (ref 50–200)
CO2 SERPL-SCNC: 28 MMOL/L (ref 21–32)
CREAT SERPL-MCNC: 1.32 MG/DL (ref 0.6–1.3)
EOSINOPHIL # BLD AUTO: 0.15 THOUSAND/ΜL (ref 0–0.61)
EOSINOPHIL NFR BLD AUTO: 2 % (ref 0–6)
ERYTHROCYTE [DISTWIDTH] IN BLOOD BY AUTOMATED COUNT: 14.9 % (ref 11.6–15.1)
GFR SERPL CREATININE-BSD FRML MDRD: 52 ML/MIN/1.73SQ M
GLUCOSE P FAST SERPL-MCNC: 106 MG/DL (ref 65–99)
HCT VFR BLD AUTO: 42 % (ref 36.5–49.3)
HDLC SERPL-MCNC: 50 MG/DL
HGB BLD-MCNC: 13 G/DL (ref 12–17)
IMM GRANULOCYTES # BLD AUTO: 0.02 THOUSAND/UL (ref 0–0.2)
IMM GRANULOCYTES NFR BLD AUTO: 0 % (ref 0–2)
LDLC SERPL CALC-MCNC: 72 MG/DL (ref 0–100)
LYMPHOCYTES # BLD AUTO: 1.4 THOUSANDS/ΜL (ref 0.6–4.47)
LYMPHOCYTES NFR BLD AUTO: 21 % (ref 14–44)
MCH RBC QN AUTO: 26.5 PG (ref 26.8–34.3)
MCHC RBC AUTO-ENTMCNC: 31 G/DL (ref 31.4–37.4)
MCV RBC AUTO: 86 FL (ref 82–98)
MONOCYTES # BLD AUTO: 0.48 THOUSAND/ΜL (ref 0.17–1.22)
MONOCYTES NFR BLD AUTO: 7 % (ref 4–12)
NEUTROPHILS # BLD AUTO: 4.71 THOUSANDS/ΜL (ref 1.85–7.62)
NEUTS SEG NFR BLD AUTO: 69 % (ref 43–75)
NONHDLC SERPL-MCNC: 96 MG/DL
NRBC BLD AUTO-RTO: 0 /100 WBCS
PLATELET # BLD AUTO: 231 THOUSANDS/UL (ref 149–390)
PMV BLD AUTO: 8.6 FL (ref 8.9–12.7)
POTASSIUM SERPL-SCNC: 4.3 MMOL/L (ref 3.5–5.3)
PROT SERPL-MCNC: 7.1 G/DL (ref 6.4–8.2)
PSA SERPL-MCNC: 3.9 NG/ML (ref 0–4)
RBC # BLD AUTO: 4.9 MILLION/UL (ref 3.88–5.62)
SODIUM SERPL-SCNC: 143 MMOL/L (ref 136–145)
TRIGL SERPL-MCNC: 120 MG/DL
WBC # BLD AUTO: 6.82 THOUSAND/UL (ref 4.31–10.16)

## 2021-10-21 PROCEDURE — G0103 PSA SCREENING: HCPCS

## 2021-10-21 PROCEDURE — 36415 COLL VENOUS BLD VENIPUNCTURE: CPT

## 2021-10-21 PROCEDURE — 80061 LIPID PANEL: CPT

## 2021-10-21 PROCEDURE — 82306 VITAMIN D 25 HYDROXY: CPT

## 2021-10-21 PROCEDURE — 80053 COMPREHEN METABOLIC PANEL: CPT

## 2021-10-21 PROCEDURE — 85025 COMPLETE CBC W/AUTO DIFF WBC: CPT

## 2021-10-21 PROCEDURE — 84153 ASSAY OF PSA TOTAL: CPT

## 2021-10-28 ENCOUNTER — OFFICE VISIT (OUTPATIENT)
Dept: GASTROENTEROLOGY | Facility: CLINIC | Age: 76
End: 2021-10-28
Payer: MEDICARE

## 2021-10-28 VITALS
BODY MASS INDEX: 29.68 KG/M2 | SYSTOLIC BLOOD PRESSURE: 130 MMHG | HEART RATE: 66 BPM | OXYGEN SATURATION: 98 % | HEIGHT: 71 IN | DIASTOLIC BLOOD PRESSURE: 70 MMHG | WEIGHT: 212 LBS

## 2021-10-28 DIAGNOSIS — K64.8 INTERNAL HEMORRHOIDS: ICD-10-CM

## 2021-10-28 DIAGNOSIS — K57.90 DIVERTICULOSIS: ICD-10-CM

## 2021-10-28 DIAGNOSIS — K21.00 GASTROESOPHAGEAL REFLUX DISEASE WITH ESOPHAGITIS WITHOUT HEMORRHAGE: ICD-10-CM

## 2021-10-28 DIAGNOSIS — Z86.010 HX OF ADENOMATOUS COLONIC POLYPS: ICD-10-CM

## 2021-10-28 DIAGNOSIS — E66.3 OVERWEIGHT: ICD-10-CM

## 2021-10-28 DIAGNOSIS — K62.5 RECTAL BLEEDING: Primary | ICD-10-CM

## 2021-10-28 PROCEDURE — 1123F ACP DISCUSS/DSCN MKR DOCD: CPT | Performed by: INTERNAL MEDICINE

## 2021-10-28 PROCEDURE — 99214 OFFICE O/P EST MOD 30 MIN: CPT | Performed by: INTERNAL MEDICINE

## 2021-11-08 ENCOUNTER — OFFICE VISIT (OUTPATIENT)
Dept: NEUROLOGY | Facility: CLINIC | Age: 76
End: 2021-11-08
Payer: MEDICARE

## 2021-11-08 VITALS
SYSTOLIC BLOOD PRESSURE: 156 MMHG | DIASTOLIC BLOOD PRESSURE: 67 MMHG | HEART RATE: 62 BPM | HEIGHT: 71 IN | WEIGHT: 207.3 LBS | BODY MASS INDEX: 29.02 KG/M2

## 2021-11-08 DIAGNOSIS — R42 DIZZINESS AND GIDDINESS: ICD-10-CM

## 2021-11-08 DIAGNOSIS — R51.9 NEW ONSET OF HEADACHES AFTER AGE 50: Primary | ICD-10-CM

## 2021-11-08 DIAGNOSIS — I77.89 OTHER SPECIFIED DISORDERS OF ARTERIES AND ARTERIOLES (HCC): ICD-10-CM

## 2021-11-08 DIAGNOSIS — G47.33 OBSTRUCTIVE SLEEP APNEA (ADULT) (PEDIATRIC): ICD-10-CM

## 2021-11-08 PROBLEM — G08 DURAL VENOUS SINUS THROMBOSIS: Status: ACTIVE | Noted: 2021-11-08

## 2021-11-08 PROCEDURE — 99215 OFFICE O/P EST HI 40 MIN: CPT | Performed by: PHYSICIAN ASSISTANT

## 2021-12-10 ENCOUNTER — HOSPITAL ENCOUNTER (OUTPATIENT)
Dept: MRI IMAGING | Facility: HOSPITAL | Age: 76
Discharge: HOME | End: 2021-12-10
Payer: MEDICARE

## 2021-12-10 DIAGNOSIS — R51.9 NEW ONSET OF HEADACHES AFTER AGE 50: ICD-10-CM

## 2021-12-10 DIAGNOSIS — R42 DIZZINESS AND GIDDINESS: ICD-10-CM

## 2021-12-10 PROCEDURE — 70553 MRI BRAIN STEM W/O & W/DYE: CPT

## 2021-12-10 PROCEDURE — G1004 CDSM NDSC: HCPCS

## 2021-12-10 PROCEDURE — A9585 GADOBUTROL INJECTION: HCPCS | Performed by: PHYSICIAN ASSISTANT

## 2021-12-10 RX ADMIN — GADOBUTROL 9 ML: 604.72 INJECTION INTRAVENOUS at 07:53

## 2021-12-11 ENCOUNTER — HOSPITAL ENCOUNTER (OUTPATIENT)
Dept: MRI IMAGING | Facility: HOSPITAL | Age: 76
Discharge: HOME | End: 2021-12-11
Payer: MEDICARE

## 2021-12-11 DIAGNOSIS — R51.9 NEW ONSET OF HEADACHES AFTER AGE 50: ICD-10-CM

## 2021-12-11 DIAGNOSIS — I77.89 OTHER SPECIFIED DISORDERS OF ARTERIES AND ARTERIOLES (HCC): ICD-10-CM

## 2021-12-11 DIAGNOSIS — R42 DIZZINESS AND GIDDINESS: ICD-10-CM

## 2021-12-11 PROCEDURE — 70547 MR ANGIOGRAPHY NECK W/O DYE: CPT

## 2021-12-11 PROCEDURE — G1004 CDSM NDSC: HCPCS

## 2021-12-11 PROCEDURE — 70544 MR ANGIOGRAPHY HEAD W/O DYE: CPT

## 2021-12-14 ENCOUNTER — TELEPHONE (OUTPATIENT)
Dept: NEUROLOGY | Facility: CLINIC | Age: 76
End: 2021-12-14

## 2021-12-16 ENCOUNTER — TELEPHONE (OUTPATIENT)
Dept: NEUROLOGY | Facility: CLINIC | Age: 76
End: 2021-12-16

## 2021-12-16 DIAGNOSIS — M54.2 CERVICALGIA: Primary | ICD-10-CM

## 2021-12-16 DIAGNOSIS — R51.9 NEW ONSET OF HEADACHES AFTER AGE 50: ICD-10-CM

## 2021-12-22 ENCOUNTER — HOSPITAL ENCOUNTER (OUTPATIENT)
Dept: RADIOLOGY | Age: 76
Discharge: HOME | End: 2021-12-22
Payer: MEDICARE

## 2021-12-22 DIAGNOSIS — I65.02 VERTEBRAL ARTERY STENOSIS/OCCLUSION, LEFT: ICD-10-CM

## 2021-12-22 DIAGNOSIS — R93.89 ABNORMAL MAGNETIC RESONANCE ANGIOGRAPHY (MRA) OF NECK: ICD-10-CM

## 2021-12-22 PROCEDURE — 70498 CT ANGIOGRAPHY NECK: CPT

## 2021-12-22 PROCEDURE — 70496 CT ANGIOGRAPHY HEAD: CPT

## 2021-12-22 PROCEDURE — G1004 CDSM NDSC: HCPCS

## 2021-12-22 RX ADMIN — IOHEXOL 85 ML: 350 INJECTION, SOLUTION INTRAVENOUS at 14:26

## 2022-01-03 ENCOUNTER — TELEPHONE (OUTPATIENT)
Dept: NEUROLOGY | Facility: CLINIC | Age: 77
End: 2022-01-03

## 2022-01-10 ENCOUNTER — TELEMEDICINE (OUTPATIENT)
Dept: NEUROLOGY | Facility: CLINIC | Age: 77
End: 2022-01-10
Payer: MEDICARE

## 2022-01-10 VITALS — WEIGHT: 203 LBS | HEIGHT: 71 IN | BODY MASS INDEX: 28.42 KG/M2

## 2022-01-10 DIAGNOSIS — G47.33 OBSTRUCTIVE SLEEP APNEA (ADULT) (PEDIATRIC): ICD-10-CM

## 2022-01-10 DIAGNOSIS — R51.9 NEW ONSET OF HEADACHES AFTER AGE 50: Primary | ICD-10-CM

## 2022-01-10 DIAGNOSIS — I10 PRIMARY HYPERTENSION: ICD-10-CM

## 2022-01-10 PROBLEM — G08 DURAL VENOUS SINUS THROMBOSIS: Status: RESOLVED | Noted: 2021-11-08 | Resolved: 2022-01-10

## 2022-01-10 PROCEDURE — 99214 OFFICE O/P EST MOD 30 MIN: CPT | Performed by: PHYSICIAN ASSISTANT

## 2022-01-10 RX ORDER — BIOTIN 1 MG
1000 TABLET ORAL DAILY
COMMUNITY

## 2022-01-10 RX ORDER — VERAPAMIL HYDROCHLORIDE 40 MG/1
40 TABLET ORAL 2 TIMES DAILY
Qty: 60 TABLET | Refills: 0 | Status: SHIPPED | OUTPATIENT
Start: 2022-01-10 | End: 2022-02-04

## 2022-01-10 NOTE — PROGRESS NOTES
Virtual Regular Visit  Verification of patient location:  Patient is located in the following state in which I hold an active license [place]      Assessment/Plan:  Problem List Items Addressed This Visit        Respiratory    Obstructive sleep apnea (adult) (pediatric)  Follow up with sleep medicine as scheduled        Cardiovascular and Mediastinum    Hypertension    Relevant Medications    verapamil (CALAN) 40 mg tablet       Other    New onset of headaches after age 47 - Primary  Continue aspirin 81mg daily   Will start verapamil 40mg twice a day to start  Discussed side effects  Recommend physical therapy  Avoid daily use of tylenol, recommend only using three times within a week to prevent medication overuse headaches    Follow up in 3 months       Relevant Medications    verapamil (CALAN) 40 mg tablet             Reason for visit is   Chief Complaint   Patient presents with    Virtual Regular Visit    Virtual Regular Visit        Encounter provider Thien Dias PA-C    Provider located at [address] [place] [address] RD  [address] [facility]  [phone]      Recent Visits  No visits were found meeting these conditions  Showing recent visits within past 7 days and meeting all other requirements  Today's Visits  Date Type Provider Dept   01/10/22 [address] [place] [address] today's visits and meeting all other requirements  Future Appointments  No visits were found meeting these conditions  Showing future appointments within next 150 days and meeting all other requirements       The patient was identified by name and date of birth  Sarah Fowler  was informed that this is a telemedicine visit and that the visit is being conducted through [facility] and patient was informed this is a secure, HIPAA-complaint platform  He agrees to proceed  My office door was closed  No one else was in the room    He acknowledged consent and understanding of privacy and security of the video platform  The patient has agreed to participate and understands they can discontinue the visit at any time  Patient is aware this is a billable service  Ramón Platt  is a 67 y o  male with past medical history of who presents in outpatient neurology clinic for follow up on headaches  He was last evaluated by neurology on 2021  He states he has been experience brief "shock waves"  He states the pain is located generally overlying the left side or sometimes bifrontally  The pain comes with any sudden movement, coughing, sneezing, bending over, etc  He states the pain is rated 7-8 out of 10  In the past hew was having a pain in his neck that would radiate forward for which he underwent PT  He states this pain is different and more intense  When he has the pain he will sometimes take two tablets of tylenol, typically once a day  He also notes left ear pain, although that is not constant, it is present more than it is not  He has seen an ophthalmologist who checked his vision  He has not started physical therapy  He is on aspirin 81mg  He is complaint with his medication without side effects  He denies any stroke like symptoms  Of note he does have a history of sleep apnea for which he was using a CPAP  Over the past 2 months he has been having difficulty with wearing his face mask and has since stopped  He also has had a 12 lb weight drop since February  Imagin/10/21 MRI brain w/wo:  1  No acute infarction, intracranial hemorrhage or mass  2   Mild, chronic microangiopathy  21 MRA head wo:  Lack of appropriate flow-related enhancement within the distal left vertebral artery suggesting severely slow flow or occlusion  CT angiography of the neck recommended to better evaluate left vertebral artery   MRA carotids wo:  1   Limited evaluation of the aortic arch and proximal cervical vasculature within the inferior neck  2  Normal common carotid artery bifurcations and visualized cervical internal carotid arteries  3 Normal visualized right vertebral artery  4  The left vertebral artery demonstrates no flow related enhancement suggesting slow flow or occlusion  CT angiography recommended for more complete evaluation      12/22/21 CTA head and neck w/wo:  1  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 with decreased minimal flow proximally and distally  Vascular consult recommended  2  Partially visualized aortic arch stent partially thrombosed excluded descending thoracic aorta  This is not significantly changed from the prior CTA March 14, 2019      Results:     10/21/21 - Vitamin D 45 3    HPI:       Past Medical History:   Diagnosis Date    Anxiety     Aortic dissection ([address] )     Brain injury ([facility] )     Hx of fall on ice in winter 2021- treated at [facility] notices occasional sharp pains in head with cough     Colon polyp     COPD (chronic obstructive pulmonary disease) (HCC)     PCP states mild    CPAP (continuous positive airway pressure) dependence     Depression     Hyperlipidemia     Hypertension     Kidney stone     Rectal bleeding     hx of when he was taking Eliquis    Renal cyst     Renal cyst     Sleep apnea     Sleep apnea, obstructive        Past Surgical History:   Procedure Laterality Date    ABDOMINAL AORTIC ANEURYSM REPAIR      about 20 years ago    CELIAC ARTERY STENT      COLONOSCOPY      KIDNEY STONE SURGERY         Current Outpatient Medications   Medication Sig Dispense Refill    acetaminophen (TYLENOL) 325 mg tablet Take 3 tablets (975 mg total) by mouth every 8 (eight) hours as needed for mild pain  0    ALPRAZolam (XANAX) 0 25 mg tablet Take 1 tablet (0 25 mg total) by mouth daily at bedtime as needed for anxiety 90 tablet 3    aspirin 81 MG tablet Take 1 tablet by mouth daily        atorvastatin (LIPITOR) 40 mg tablet Take 40 mg by mouth daily  0    Cholecalciferol (Vitamin D3) 25 MCG (1000 UT) CAPS Take 1,000 Units by mouth daily      DULoxetine (CYMBALTA) 60 mg delayed release capsule Take 1 capsule (60 mg total) by mouth daily 90 capsule 3    labetalol (NORMODYNE) 200 mg tablet Take 100 mg by mouth 2 (two) times a day      BUTALBITAL-ACETAMINOPHEN PO Take by mouth (Patient not taking: Reported on 11/8/2021 )      Cholecalciferol (VITAMIN D3) 53975 units CAPS Take 50,000 Units by mouth once a week   (Patient not taking: Reported on 1/10/2022 )      omeprazole (PriLOSEC) 40 MG capsule take 1 capsule by mouth once daily take 1/2,HOUR before breakfast (Patient not taking: Reported on 11/8/2021) 30 capsule 1    verapamil (CALAN) 40 mg tablet Take 1 tablet (40 mg total) by mouth 2 (two) times a day 60 tablet 0     No current facility-administered medications for this visit  Allergies   Allergen Reactions    Lorazepam Hyperactivity       Review of Systems   Constitutional: Negative  Negative for appetite change and fever  HENT: Negative  Negative for hearing loss, tinnitus, trouble swallowing and voice change  Eyes: Negative  Negative for photophobia and pain  Respiratory: Negative  Negative for shortness of breath  Cardiovascular: Negative  Negative for palpitations  Gastrointestinal: Negative  Negative for nausea and vomiting  Endocrine: Negative  Negative for cold intolerance  Genitourinary: Negative  Negative for dysuria, frequency and urgency  Musculoskeletal: Positive for neck pain (shooting pain ratiating to the top of head)  Negative for myalgias  Skin: Negative  Negative for rash  Neurological: Negative for dizziness, tremors, seizures, syncope, facial asymmetry, speech difficulty, weakness, light-headedness and numbness  Hematological: Negative  Does not bruise/bleed easily  Psychiatric/Behavioral: Negative    Negative for confusion, hallucinations and sleep disturbance  ROS reviewed and edited as needed  PE:       Vitals:    01/10/22 0940   Weight: 92 1 kg (203 lb)   Height: 5' 11" (1 803 m)       Physical Exam   Unable to connect to video      I spent 30 minutes directly with the patient during this visit    VIRTUAL VISIT Shauna  verbally agrees to participate in [employer]  Pt is aware that [employer] could be limited without vital signs or the ability to perform a full hands-on physical Massiel oRjas  understands he or the provider may request at any time to terminate the video visit and request the patient to seek care or treatment in person

## 2022-01-10 NOTE — PATIENT INSTRUCTIONS
· Continue aspirin 81mg daily  · Start physical therapy   · Will start verapamil 40mg twice a day  · Side effects include dizziness, headaches, constipation, nausea  · If you note your heart beat is slow, you become dizzy and fatigue please let us know  · Try to avoid taking tylenol daily  Recommend no more than 3 times a week to avoid creating medication overuse headaches  · Follow up with sleep medicine as scheduled    Recommend follow up in 3 months   Call [phone] (team 4) with any questions or concerns

## 2022-01-18 ENCOUNTER — EVALUATION (OUTPATIENT)
Dept: PHYSICAL THERAPY | Facility: REHABILITATION | Age: 77
End: 2022-01-18
Payer: MEDICARE

## 2022-01-18 DIAGNOSIS — R51.9 NEW ONSET OF HEADACHES AFTER AGE 50: ICD-10-CM

## 2022-01-18 DIAGNOSIS — M54.2 CERVICALGIA: Primary | ICD-10-CM

## 2022-01-18 PROCEDURE — 97161 PT EVAL LOW COMPLEX 20 MIN: CPT | Performed by: PHYSICAL THERAPIST

## 2022-01-18 PROCEDURE — 97110 THERAPEUTIC EXERCISES: CPT | Performed by: PHYSICAL THERAPIST

## 2022-01-18 NOTE — PROGRESS NOTES
PT Evaluation     Today's date: 2022  Patient name: Milli Fenton  : 1945  MRN: [id number]  Referring provider: Nery Carter  Dx:   Encounter Diagnosis     ICD-10-CM    1  Cervicalgia  M54 2 Ambulatory referral to Physical Therapy   2  New onset of headaches after age 50  R53 10 Ambulatory referral to Physical Therapy       Start Time: 0950  Stop Time: 1040  Total time in clinic (min): 50 minutes    Assessment  Assessment details: Milli Fenton  is a 67y o  year old male who presents to  with cervicalgia and new onset of headaches after age 47  Patient presents with shooting pain into the left side of his head and ear with coughing, sneezing and bending forward  He presents with poor posture, poor strength and poor cervical range of motion  Niyah Lynn presents with the impairments as listed above and would benefit from Physical Therapy to address these impairments, improved quality of life and to maximize function  Impairments: abnormal muscle tone, abnormal or restricted ROM, activity intolerance, impaired physical strength, lacks appropriate home exercise program, pain with function and poor posture   Barriers to therapy: Falls risk    Goals  Short-Term Goals:   1  Patient will demonstrate increased cervical ROM by 25% in 4 weeks  2  Patient will report reduction in frequency and intensity of pain in 4 weeks  Long-Term Goals:   1  Patient will demonstrate decreased frequency of shooting pain and headaches upon discharge  2  Patient will be independent with HEP at time of discharge  3  Patient will be able to bend over and lift with minimal to no pain at time of discharge  Plan  Plan details: Thank you for opportunity to participate in the care of Milli Fenton       Patient would benefit from: skilled physical therapy and PT eval  Planned modality interventions: cryotherapy and thermotherapy: hydrocollator packs  Planned therapy interventions: abdominal trunk stabilization, home exercise program, therapeutic activities, therapeutic exercise and strengthening  Frequency: 2x week  Duration in visits: 10  Plan of Care beginning date: 2022  Treatment plan discussed with: patient        Subjective Evaluation    History of Present Illness  Mechanism of injury: Patient is a 67 y o  presenting to physical therapy with complaints of shooting pain up the left side of his head and behind/in his left ear with sneezing, coughing and bending over to pick something up  He reports the pain usually subsides quickly  He had a fall on ice in 2021 where he hit his head and this pain began about 3 months after the fall  Denies loss of consciousness and was evaluated at [address] [place] [address]  Patient denies neck pain or pain with neck motions bother him except for looking down  He has had PT in the past for neck pain  He denies migraines or headaches but can have aching in his head after these episodes of sharp pain  He reports he can feel unsteady when walking sometimes but denies loss of balance  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  Imaging: has had multiple MRIs of his head, CT of head and neck on 2021 - "everything looked fine"  Denies N/T    PT goals: "help get rid of the pain"       Pain  Current pain ratin  At best pain ratin  At worst pain ratin  Quality: sharp  Relieving factors: medications and rest    Social Support  Lives with: spouse    Employment status: not working (retired)  Hand dominance: right    Treatments  Current treatment: medication and physical therapy  Patient Goals  Patient goals for therapy: decreased pain, increased motion and increased strength          Objective     Concurrent Complaints  Positive for tinnitus   Negative for night pain, disturbed sleep, dizziness, faints, headaches, nausea/motion sickness, trouble swallowing, difficulty breathing, shortness of breath, respiratory pain, visual change, cardiac problem, kidney problem, gallbladder problem, stomach problem, ulcer, appendix problem, spleen problem, pancreas problem, history of cancer, history of trauma and infection    Additional Special Questions  Has had full eye exam     Static Posture     Head  Forward  Shoulders  Rounded  Thoracic Spine  Hyperkyphosis  Postural Observations  Seated posture: poor  Standing posture: poor        Palpation   Left   No palpable tenderness to the suboccipitals  Tenderness of the upper trapezius  Right   No palpable tenderness to the suboccipitals  Tenderness of the upper trapezius  Active Range of Motion   Cervical/Thoracic Spine       Cervical    Flexion: 70 degrees  with pain  Extension: 15 degrees     with pain  Left lateral flexion: 15 degrees      Right lateral flexion: 15 (vertex) degrees     with pain    Additional Active Range of Motion Details  Forward head - resting in 15* flexion  Seated cervical rotation 30% bilaterally     Strength/Myotome Testing   Cervical Spine   Neck extension: 4  Neck flexion: 4    Left   Neck lateral flexion (C3): 4    Right   Neck lateral flexion (C3): 4    Left Shoulder     Planes of Motion   Flexion: 3+   Abduction: 3+   External rotation at 0°: 3+   Internal rotation at 0°: 3+     Isolated Muscles   Upper trapezius: 5     Right Shoulder     Planes of Motion   Flexion: 3+   Abduction: 3+   External rotation at 0°: 3+   Internal rotation at 0°: 3+     Isolated Muscles   Upper trapezius: 5     Left Elbow   Flexion: 4  Extension: 4    Right Elbow   Flexion: 4  Extension: 4    Tests   Cervical   Positive neck flexor muscle endurance test   Negative vertical compression  Left   Negative Spurling's Test A  Right   Negative Spurling's Test A  Lumbar   Negative vertical compression       Additional Tests Details  DNFE 8" - pain up to the top of his head and left eye post-test  Neuro Exam:     Headaches   Patient reports headaches: No               Precautions: Falls risk, brain injury, COPD, HTN, Aortic aneurysm repair       Manuals 1/18                                                                Neuro Re-Ed             3 finger ROM                          Cervical flexion isometric             Cervical extension isometric             Cervical SB isometric             Valsalva                          Ther Ex             UBE?                           Chin tucks             Scapular retractions                          TB low rows             TB mid rows                          Ther Activity                                       Gait Training                                       Modalities

## 2022-01-19 ENCOUNTER — OFFICE VISIT (OUTPATIENT)
Dept: PSYCHIATRY | Facility: CLINIC | Age: 77
End: 2022-01-19
Payer: MEDICARE

## 2022-01-19 DIAGNOSIS — F41.1 GAD (GENERALIZED ANXIETY DISORDER): ICD-10-CM

## 2022-01-19 DIAGNOSIS — F33.1 MODERATE EPISODE OF RECURRENT MAJOR DEPRESSIVE DISORDER (HCC): Primary | ICD-10-CM

## 2022-01-19 DIAGNOSIS — F33.40 RECURRENT MAJOR DEPRESSIVE DISORDER, IN REMISSION (HCC): ICD-10-CM

## 2022-01-19 PROCEDURE — 99214 OFFICE O/P EST MOD 30 MIN: CPT | Performed by: NURSE PRACTITIONER

## 2022-01-19 RX ORDER — ALPRAZOLAM 0.25 MG/1
0.25 TABLET ORAL
Qty: 90 TABLET | Refills: 3 | Status: SHIPPED | OUTPATIENT
Start: 2022-01-19 | End: 2022-04-04 | Stop reason: SDUPTHER

## 2022-01-19 RX ORDER — DULOXETIN HYDROCHLORIDE 60 MG/1
60 CAPSULE, DELAYED RELEASE ORAL DAILY
Qty: 90 CAPSULE | Refills: 3 | Status: SHIPPED | OUTPATIENT
Start: 2022-01-19

## 2022-01-19 NOTE — PSYCH
TREATMENT PLAN (Medication Management Only)  Treatment Plan done but not signed at time of office visit due to:  Plan reviewed by phone or in person  and verbal consent given due to [address]    Name and Date of Birth:  Bryant Lake  67 y o  1945  Date of Treatment Plan: January 19, 2022  Diagnosis/Diagnoses:    1  Moderate episode of recurrent major depressive disorder ([facility] [address] )    2  DAVIE (generalized anxiety disorder)    3  Recurrent major depressive disorder, in remission [facility])      Strengths/Personal Resources for Self-Care: supportive family, supportive friends  Area/Areas of need (in own words): "I am doing well  I feel good "  1  Long Term Goal: "To continue to feel well and stay active"  Target Date: 6 months - 7/19/2022  Person/Persons responsible for completion of goal: Aaron  2  Short Term Objective (s) - How will we reach this goal?:   A  Provider new recommended medication/dosage changes and/or continue medication(s): continue current medications as prescribed  B   N/A  Target Date: 6 months - 7/19/2022  Person/Persons Responsible for Completion of Goal: Aaron  Progress Towards Goals: stable, continuing treatment  Treatment Modality: medication education at every visit  Review due 6 months from date of this plan: 6 months - 7/19/2022  Expected length of service: ongoing treatment unless revised  My Physician/PA/NP and I have developed this plan together and I agree to work on the goals and objectives  I understand the treatment goals that were developed for my treatment

## 2022-01-19 NOTE — PSYCH
PROGRESS NOTE        [facility]      Name and Date of Birth:  Lindsay Chiang  67 y o  1945    Date of Visit: 01/19/22    SUBJECTIVE:  Patient is a 65-year-old male has history of depression anxiety disorder  On examination today, he is doing very well  And his wife for out to breakfast this morning with her son and enjoy themselves  Gets thoughts fairly frequently but not too often with COVID scare  So far, he is sleeping well, he is eating well and he is reporting no down times  No new health issues  Will continue on his current medication regimen since it is helping his depression anxiety and his sleep  He will follow-up with me in 5 months  He denies suicidal ideation, intent or plan at present, has no suicidal ideation, intent or plan at present  He denies any auditory hallucinations and visual hallucinations, denies any other delusional thinking, denies any delusional thinking  He denies any side effects from medications    HPI ROS Appetite Changes and Sleep: normal appetite, normal sleep    Review Of Systems:      Constitutional Negative   ENT Negative   Cardiovascular Negative   Respiratory As Noted in HPI   Gastrointestinal Negative   Genitourinary Negative   Musculoskeletal Negative   Integumentary Negative   Neurological Negative   Endocrine Negative   Other Symptoms Negative and None       Laboratory Results: No results found for this or any previous visit      Substance Abuse History:    Social History     Substance and Sexual Activity   Drug Use No       Family Psychiatric History:     Family History   Problem Relation Age of Onset    Stroke Mother     Prostate cancer Father        The following portions of the patient's history were reviewed and updated as appropriate: past family history, past medical history, past social history, past surgical history and problem list     Social History     Socioeconomic History  Marital status: /Civil Union     Spouse name: Not on file    Number of children: Not on file    Years of education: Not on file    Highest education level: Not on file   Occupational History    Not on file   Tobacco Use    Smoking status: Former Smoker     Packs/day: 1 00     Years: 24 00     Pack years: 24 00     Types: Cigarettes     Start date: 1959     Quit date: 1983     Years since quittin 7    Smokeless tobacco: Never Used   Vaping Use    Vaping Use: Never used   Substance and Sexual Activity    Alcohol use: No    Drug use: No    Sexual activity: Never   Other Topics Concern    Not on file   Social History Narrative    Not on file     Social Determinants of Health     Financial Resource Strain: Not on file   Food Insecurity: Not on file   Transportation Needs: Not on file   Physical Activity: Not on file   Stress: Not on file   Social Connections: Not on file   Intimate Partner Violence: Not on file   Housing Stability: Not on file     Social History     Social History Narrative    Not on file        Social History     Tobacco History     Smoking Status  Former Smoker Smoking Start Date  1959 Quit date  1983 Smoking Frequency  1 pack/day for 24 years (24 pk yrs)    Smoking Tobacco Type  Cigarettes    Smokeless Tobacco Use  Never Used          Alcohol History     Alcohol Use Status  No          Drug Use     Drug Use Status  No          Sexual Activity     Sexually Active  Never          Activities of Daily Living    Not Asked                     OBJECTIVE:     Mental Status Evaluation:    Appearance age appropriate, casually dressed   Behavior pleasant, cooperative   Speech normal volume, normal pitch   Mood Stable mood   Affect Bright affect   Thought Processes logical   Associations intact associations   Thought Content Normal   Perceptual Disturbances: None   Abnormal Thoughts  Risk Potential Suicidal ideation - None  Homicidal ideation - None  Potential for aggression - No   Orientation oriented to person, place, time/date and situation   Memory recent and remote memory grossly intact   Cosciousness alert and awake   Attention Span Good   Intellect Appears to be of Average Intelligence   Insight Good   Judgement Good   Muscle Strength and  Gait muscle strength and tone were normal   Language no difficulty naming common objects   Fund of Knowledge displays adequate knowledge of current events   Pain None   Pain Scale 0       Assessment/Plan:       Diagnoses and all orders for this visit:    Moderate episode of recurrent major depressive disorder (HCC)    DAVIE (generalized anxiety disorder)  -     ALPRAZolam (XANAX) 0 25 mg tablet; Take 1 tablet (0 25 mg total) by mouth daily at bedtime as needed for anxiety    Recurrent major depressive disorder, in remission (HCC)  -     DULoxetine (CYMBALTA) 60 mg delayed release capsule; Take 1 capsule (60 mg total) by mouth daily          Treatment Recommendations/Precautions:    Continue current medications:  Cymbalta 60 mg daily   Xanax 0 25 mg daily at bedtime as needed 1st anxiety or sleep   Follow-up with me in 5 months or sooner if necessary  Risks/Benefits      Risks, Benefits And Possible Side Effects Of Medications:    Risks, benefits, and possible side effects of medications explained to patient and patient verbalizes understanding and agreement for treatment      Controlled Medication Discussion:  No history of any overuse or abuse of controlled substances    Not applicable    Psychotherapy Provided:     Individual psychotherapy provided: No

## 2022-01-21 ENCOUNTER — OFFICE VISIT (OUTPATIENT)
Dept: PHYSICAL THERAPY | Facility: REHABILITATION | Age: 77
End: 2022-01-21
Payer: MEDICARE

## 2022-01-21 DIAGNOSIS — M54.2 CERVICALGIA: Primary | ICD-10-CM

## 2022-01-21 DIAGNOSIS — R51.9 NEW ONSET OF HEADACHES AFTER AGE 50: ICD-10-CM

## 2022-01-21 PROCEDURE — 97112 NEUROMUSCULAR REEDUCATION: CPT | Performed by: PHYSICAL THERAPIST

## 2022-01-21 PROCEDURE — 97110 THERAPEUTIC EXERCISES: CPT | Performed by: PHYSICAL THERAPIST

## 2022-01-21 NOTE — PROGRESS NOTES
Daily Note     Today's date: 2022  Patient name: Bryant Lake  : 1945  MRN: [id number]  Referring provider: Elin Calles  Dx:   Encounter Diagnosis     ICD-10-CM    1  Cervicalgia  M54 2    2  New onset of headaches after age 49  R53 10        Start Time: 08  Stop Time:   Total time in clinic (min): 43 minutes    Subjective: Patient reports he has not had any head pain yet today  He reports getting the sharp pain every day stating "if I'm not coughing or sneezing it seems to be ok "      Objective: See treatment diary below      Assessment: Tolerated treatment well  Initiated plan of care this visit with fair tolerance  No recreation of sharp neck/head pain throughout session  Patient does report some discomfort on the left side of his head/behind the ear with chin tucks, however likely due to proper muscle engagement and stretching of suboccipitals  Evident muscular fatigue noted with scapular retractions and TB rows  Patient demonstrated fatigue post treatment, exhibited good technique with therapeutic exercises and would benefit from continued PT  Plan: Continue per plan of care  Precautions: Falls risk, brain injury, COPD, HTN, Aortic aneurysm repair       Manuals                                                                Neuro Re-Ed             3 finger ROM                          Cervical flexion isometric  2x10x5"           Cervical extension isometric  2x10x5"           Cervical SB isometric  2x10x5"           Valsalva                          Ther Ex             UBE?   10W 5' retro                        Chin tucks  2x10x5"           Scapular retractions  2x10x5"                        TB low rows  OTB 2x10           TB mid rows  GTB 2x10                        Ther Activity                                       Gait Training                                       Modalities

## 2022-01-24 ENCOUNTER — OFFICE VISIT (OUTPATIENT)
Dept: PHYSICAL THERAPY | Facility: REHABILITATION | Age: 77
End: 2022-01-24
Payer: MEDICARE

## 2022-01-24 DIAGNOSIS — R51.9 NEW ONSET OF HEADACHES AFTER AGE 50: ICD-10-CM

## 2022-01-24 DIAGNOSIS — M54.2 CERVICALGIA: Primary | ICD-10-CM

## 2022-01-24 PROCEDURE — 97112 NEUROMUSCULAR REEDUCATION: CPT | Performed by: PHYSICAL THERAPIST

## 2022-01-24 PROCEDURE — 97110 THERAPEUTIC EXERCISES: CPT | Performed by: PHYSICAL THERAPIST

## 2022-01-24 PROCEDURE — 97140 MANUAL THERAPY 1/> REGIONS: CPT | Performed by: PHYSICAL THERAPIST

## 2022-01-24 NOTE — PROGRESS NOTES
Daily Note     Today's date: 2022  Patient name: Ronak Solares  : 1945  MRN: [id number]  Referring provider: Melina Jovel  Dx:   Encounter Diagnosis     ICD-10-CM    1  Cervicalgia  M54 2    2  New onset of headaches after age 49  R53 10        Start Time: 3299  Stop Time: 1105  Total time in clinic (min): 50 minutes    Subjective: Patient reports a headache after last PT session that subsided with Tylenol  Objective: See treatment diary below      Assessment: Tolerated treatment well  Evident tenderness and tightness to left suboccipitals with good tolerance to SOR  Appropriate stretch reported to suboccipitals with chin tucks indicating evident tightness due to chronic forward head posture  Continues to report moderate muscle fatigue with all TE this visit  Patient demonstrated fatigue post treatment, exhibited good technique with therapeutic exercises and would benefit from continued PT  Plan: Continue per plan of care  Precautions: Falls risk, brain injury, COPD, HTN, Aortic aneurysm repair       Manuals           SOR   Done                                                 Neuro Re-Ed             3 finger ROM                          Cervical flexion isometric  2x10x5" 2x10x5"          Cervical extension isometric  2x10x5" 2x10x5"          Cervical SB isometric  2x10x5" 2x10x5"          Valsalva                          Ther Ex             UBE?   10W 5' retro 15W 5' retro                       Chin tucks  2x10x5" 2x10x5"          Scapular retractions  2x10x5" 3x10x5"                       TB low rows  OTB 2x10 OTB 2x10          TB mid rows  GTB 2x10 GTB 2x10                       Ther Activity                                       Gait Training                                       Modalities

## 2022-01-26 ENCOUNTER — OFFICE VISIT (OUTPATIENT)
Dept: PHYSICAL THERAPY | Facility: REHABILITATION | Age: 77
End: 2022-01-26
Payer: MEDICARE

## 2022-01-26 DIAGNOSIS — M54.2 CERVICALGIA: Primary | ICD-10-CM

## 2022-01-26 DIAGNOSIS — M25.532 LEFT WRIST PAIN: ICD-10-CM

## 2022-01-26 DIAGNOSIS — R51.9 NEW ONSET OF HEADACHES AFTER AGE 50: ICD-10-CM

## 2022-01-26 PROCEDURE — 97110 THERAPEUTIC EXERCISES: CPT

## 2022-01-26 PROCEDURE — 97140 MANUAL THERAPY 1/> REGIONS: CPT

## 2022-01-26 PROCEDURE — 97112 NEUROMUSCULAR REEDUCATION: CPT

## 2022-01-26 NOTE — PROGRESS NOTES
Daily Note     Today's date: 2022  Patient name: Jessica Quintanilla  : 1945  MRN: [id number]  Referring provider: Angela Chapman  Dx:   Encounter Diagnosis     ICD-10-CM    1  Cervicalgia  M54 2    2  New onset of headaches after age 49  R53 10        Start Time: 73  Stop Time: 1104  Total time in clinic (min): 49 minutes    Subjective: Patient reporting increased soreness at start of session, mainly with neck  Objective: See treatment diary below      Assessment: Tolerated treatment well  Patient demonstrated fatigue post treatment, exhibited good technique with therapeutic exercises and would benefit from continued PT No pain reported with any TE performed, however fatigues quickly  Cues required to avoid forward head posture with completion of TB low rows/ rows  Plan: Continue per plan of care  Progress treatment as tolerated  Precautions: Falls risk, brain injury, COPD, HTN, Aortic aneurysm repair       Manuals          SOR   Done Done                                                Neuro Re-Ed             3 finger ROM                          Cervical flexion isometric  2x10x5" 2x10x5" 2x10x5''         Cervical extension isometric  2x10x5" 2x10x5" 2x10x5''         Cervical SB isometric  2x10x5" 2x10x5" 2x10x5''         Valsalva                          Ther Ex             UBE?   10W 5' retro 15W 5' retro 15W 5' retro                      Chin tucks  2x10x5" 2x10x5" 2x10x5''         Scapular retractions  2x10x5" 3x10x5" 3x10x5''                      TB low rows  OTB 2x10 OTB 2x10 OTB 2x10         TB mid rows  GTB 2x10 GTB 2x10 GTB 3x10                      Ther Activity                                       Gait Training                                       Modalities

## 2022-01-31 ENCOUNTER — OFFICE VISIT (OUTPATIENT)
Dept: PHYSICAL THERAPY | Facility: REHABILITATION | Age: 77
End: 2022-01-31
Payer: MEDICARE

## 2022-01-31 DIAGNOSIS — M54.2 CERVICALGIA: Primary | ICD-10-CM

## 2022-01-31 DIAGNOSIS — R51.9 NEW ONSET OF HEADACHES AFTER AGE 50: ICD-10-CM

## 2022-01-31 PROCEDURE — 97112 NEUROMUSCULAR REEDUCATION: CPT

## 2022-01-31 PROCEDURE — 97110 THERAPEUTIC EXERCISES: CPT

## 2022-01-31 PROCEDURE — 97140 MANUAL THERAPY 1/> REGIONS: CPT

## 2022-01-31 NOTE — PROGRESS NOTES
Daily Note     Today's date: 2022  Patient name: Leslie Edwards  : 1945  MRN: [id number]  Referring provider: José Antonio Reyes  Dx:   Encounter Diagnosis     ICD-10-CM    1  Cervicalgia  M54 2    2  New onset of headaches after age 49  R53 10        Start Time: 08  Stop Time: 930  Total time in clinic (min): 45 minutes    Subjective: Patient reports feeling "good" at start of session, reporting only one case of increased "head pain" stating "it happened when I sneezed "  He reports no complaints after previous session  Objective: See treatment diary below      Assessment: Tolerated treatment well  Patient demonstrated fatigue post treatment, exhibited good technique with therapeutic exercises and would benefit from continued PT No pain reported with any TE performed, only muscle fatigue especially with TB TE        Plan: Continue per plan of care  Progress treatment as tolerated  Precautions: Falls risk, brain injury, COPD, HTN, Aortic aneurysm repair       Manuals         SOR   Done Done Done                                               Neuro Re-Ed             3 finger ROM                          Cervical flexion isometric  2x10x5" 2x10x5" 2x10x5'' 2x10x5''        Cervical extension isometric  2x10x5" 2x10x5" 2x10x5'' 2x10X5''        Cervical SB isometric  2x10x5" 2x10x5" 2x10x5'' 2x10x5''        Valsalva                          Ther Ex             UBE?   10W 5' retro 15W 5' retro 15W 5' retro 15W 5' retro                     Chin tucks  2x10x5" 2x10x5" 2x10x5'' 2x10x5''        Scapular retractions  2x10x5" 3x10x5" 3x10x5'' 3x10x5''                     TB low rows  OTB 2x10 OTB 2x10 OTB 2x10 OTB 3x10        TB mid rows  GTB 2x10 GTB 2x10 GTB 3x10 GTB 3x10                     Ther Activity                                       Gait Training                                       Modalities

## 2022-02-02 ENCOUNTER — OFFICE VISIT (OUTPATIENT)
Dept: PHYSICAL THERAPY | Facility: REHABILITATION | Age: 77
End: 2022-02-02
Payer: MEDICARE

## 2022-02-02 DIAGNOSIS — R51.9 NEW ONSET OF HEADACHES AFTER AGE 50: ICD-10-CM

## 2022-02-02 DIAGNOSIS — M54.2 CERVICALGIA: Primary | ICD-10-CM

## 2022-02-02 PROCEDURE — 97110 THERAPEUTIC EXERCISES: CPT | Performed by: PHYSICAL THERAPIST

## 2022-02-02 PROCEDURE — 97140 MANUAL THERAPY 1/> REGIONS: CPT | Performed by: PHYSICAL THERAPIST

## 2022-02-02 NOTE — PROGRESS NOTES
Daily Note     Today's date: 2022  Patient name: Jody Bryant  : 1945  MRN: [id number]  Referring provider: Cynthia Alvarez  Dx:   Encounter Diagnosis     ICD-10-CM    1  Cervicalgia  M54 2    2  New onset of headaches after age 49  R53 10        Start Time: 0845  Stop Time: 0915  Total time in clinic (min): 30 minutes    Subjective: Gerardo Melissa reports he's doing well at start of session; notes good compliance with HEP  Objective: See treatment diary below      Assessment: Tolerated treatment well  Session truncated secondary to patient asking to end session 15 minutes early  Occasional verbal and tactile cuing required during mid and low rows for proper form  Challenged with scapular retraction with increased reps of rows due to fatigue  Patient demonstrated appropriate level of challenge and muscular fatigue throughout session and noted good status at end of visit  Patient demonstrated fatigue post treatment, exhibited good technique with therapeutic exercises and would benefit from continued PT  Plan: Continue per plan of care  Progress treatment as tolerated  Precautions: Falls risk, brain injury, COPD, HTN, Aortic aneurysm repair       Manuals  22       SOR   Done Done Done Done                                               Neuro Re-Ed             3 finger ROM                          Cervical flexion isometric  2x10x5" 2x10x5" 2x10x5'' 2x10x5''        Cervical extension isometric  2x10x5" 2x10x5" 2x10x5'' 2x10X5''        Cervical SB isometric  2x10x5" 2x10x5" 2x10x5'' 2x10x5''        Valsalva                          Ther Ex             UBE?   10W 5' retro 15W 5' retro 15W 5' retro 15W 5' retro 15W 5' retro                    Chin tucks  2x10x5" 2x10x5" 2x10x5'' 2x10x5'' 3x10x5"       Scapular retractions  2x10x5" 3x10x5" 3x10x5'' 3x10x5''                     TB low rows  OTB 2x10 OTB 2x10 OTB 2x10 OTB 3x10 OTB 3x10       TB mid rows  GTB 2x10 GTB 2x10 GTB 3x10 GTB 3x10 GTB 3x10                    Ther Activity                                       Gait Training                                       Modalities

## 2022-02-04 DIAGNOSIS — R51.9 NEW ONSET OF HEADACHES AFTER AGE 50: ICD-10-CM

## 2022-02-04 RX ORDER — VERAPAMIL HYDROCHLORIDE 40 MG/1
TABLET ORAL
Qty: 60 TABLET | Refills: 0 | Status: SHIPPED | OUTPATIENT
Start: 2022-02-04 | End: 2022-03-04

## 2022-02-07 ENCOUNTER — OFFICE VISIT (OUTPATIENT)
Dept: PHYSICAL THERAPY | Facility: REHABILITATION | Age: 77
End: 2022-02-07
Payer: MEDICARE

## 2022-02-07 DIAGNOSIS — R51.9 NEW ONSET OF HEADACHES AFTER AGE 50: ICD-10-CM

## 2022-02-07 DIAGNOSIS — M54.2 CERVICALGIA: Primary | ICD-10-CM

## 2022-02-07 PROCEDURE — 97140 MANUAL THERAPY 1/> REGIONS: CPT

## 2022-02-07 PROCEDURE — 97112 NEUROMUSCULAR REEDUCATION: CPT

## 2022-02-07 PROCEDURE — 97110 THERAPEUTIC EXERCISES: CPT

## 2022-02-07 NOTE — PROGRESS NOTES
Daily Note     Today's date: 2022  Patient name: Adilson Garcia  : 1945  MRN: [id number]  Referring provider: Mehdi Kunz  Dx:   Encounter Diagnosis     ICD-10-CM    1  Cervicalgia  M54 2    2  New onset of headaches after age 49  R53 10        Start Time: 838  Stop Time: 925  Total time in clinic (min): 47 minutes    Subjective: Patient reporting headaches as "better" at start of session, continues to report having headaches when sneezing or coughing  He reports no complaints after previous session  Objective: See treatment diary below      Assessment: Tolerated treatment well  Patient demonstrated fatigue post treatment, exhibited good technique with therapeutic exercises and would benefit from continued PT Cues required for "gentle" contractions especially upon chin tucks  Cues required for proper form with TB TE        Plan: Continue per plan of care  Progress treatment as tolerated  Precautions: Falls risk, brain injury, COPD, HTN, Aortic aneurysm repair       Manuals       SOR   Done Done Done Done  Done                                             Neuro Re-Ed             3 finger ROM                          Cervical flexion isometric  2x10x5" 2x10x5" 2x10x5'' 2x10x5''  2x10x5''      Cervical extension isometric  2x10x5" 2x10x5" 2x10x5'' 2x10X5''  2x10x5''      Cervical SB isometric  2x10x5" 2x10x5" 2x10x5'' 2x10x5''  2x10x5''      Valsalva                          Ther Ex             UBE?   10W 5' retro 15W 5' retro 15W 5' retro 15W 5' retro 15W 5' retro 15W 5' retro                   Chin tucks  2x10x5" 2x10x5" 2x10x5'' 2x10x5'' 3x10x5" 3x10x5''      Scapular retractions  2x10x5" 3x10x5" 3x10x5'' 3x10x5''  3x10x5''                   TB low rows  OTB 2x10 OTB 2x10 OTB 2x10 OTB 3x10 OTB 3x10 OTB 3x12      TB mid rows  GTB 2x10 GTB 2x10 GTB 3x10 GTB 3x10 GTB 3x10 GTB 3x12                   Ther Activity Gait Training                                       Modalities

## 2022-02-09 ENCOUNTER — OFFICE VISIT (OUTPATIENT)
Dept: PHYSICAL THERAPY | Facility: REHABILITATION | Age: 77
End: 2022-02-09
Payer: MEDICARE

## 2022-02-09 DIAGNOSIS — M25.532 LEFT WRIST PAIN: ICD-10-CM

## 2022-02-09 DIAGNOSIS — R51.9 NEW ONSET OF HEADACHES AFTER AGE 50: ICD-10-CM

## 2022-02-09 DIAGNOSIS — M54.2 CERVICALGIA: Primary | ICD-10-CM

## 2022-02-09 PROCEDURE — 97112 NEUROMUSCULAR REEDUCATION: CPT

## 2022-02-09 PROCEDURE — 97140 MANUAL THERAPY 1/> REGIONS: CPT

## 2022-02-09 PROCEDURE — 97110 THERAPEUTIC EXERCISES: CPT

## 2022-02-09 NOTE — PROGRESS NOTES
Daily Note     Today's date: 2022  Patient name: Tobi Sargent  : 1945  MRN: [id number]  Referring provider: Taj Romeo  Dx:   Encounter Diagnosis     ICD-10-CM    1  Cervicalgia  M54 2    2  New onset of headaches after age 47  R51 9    3  Left wrist pain  M25 532        Start Time: 0845  Stop Time: 935  Total time in clinic (min): 50 minutes    Subjective: Patient reports headaches are not as frequent however continues to report head discomfort upon coughing/sneezing  He reports no complaints after previous session  Objective: See treatment diary below      Assessment: Tolerated treatment well  Patient demonstrated fatigue post treatment, exhibited good technique with therapeutic exercises and would benefit from continued PT No pain reported with any TE performed, cues required for proper form with chin tucks this session  Plan: Continue per plan of care  Progress treatment as tolerated  Precautions: Falls risk, brain injury, COPD, HTN, Aortic aneurysm repair       Manuals     SOR   Done Done Done Done  Done                                         Neuro Re-Ed            3 finger ROM                        Cervical flexion isometric  2x10x5" 2x10x5" 2x10x5'' 2x10x5''  2x10x5'' 2x10x5''     Cervical extension isometric  2x10x5" 2x10x5" 2x10x5'' 2x10X5''  2x10x5'' 2x10x5''    Cervical SB isometric  2x10x5" 2x10x5" 2x10x5'' 2x10x5''  2x10x5'' 2x10x5''    Valsalva                        Ther Ex            UBE?   10W 5' retro 15W 5' retro 15W 5' retro 15W 5' retro 15W 5' retro 15W 5' retro 15W 5' retro                Chin tucks  2x10x5" 2x10x5" 2x10x5'' 2x10x5'' 3x10x5" 3x10x5'' 3x10x5''    Scapular retractions  2x10x5" 3x10x5" 3x10x5'' 3x10x5''  3x10x5'' 3x10x5''                TB low rows  OTB 2x10 OTB 2x10 OTB 2x10 OTB 3x10 OTB 3x10 OTB 3x12 OTB 3x12    TB mid rows  GTB 2x10 GTB 2x10 GTB 3x10 GTB 3x10 GTB 3x10 GTB 3x12 GTB 3x12 Ther Activity                                    Gait Training                                    Modalities

## 2022-02-14 ENCOUNTER — OFFICE VISIT (OUTPATIENT)
Dept: PHYSICAL THERAPY | Facility: REHABILITATION | Age: 77
End: 2022-02-14
Payer: MEDICARE

## 2022-02-14 DIAGNOSIS — M54.2 CERVICALGIA: Primary | ICD-10-CM

## 2022-02-14 DIAGNOSIS — R51.9 NEW ONSET OF HEADACHES AFTER AGE 50: ICD-10-CM

## 2022-02-14 PROCEDURE — 97110 THERAPEUTIC EXERCISES: CPT | Performed by: PHYSICAL THERAPIST

## 2022-02-14 PROCEDURE — 97112 NEUROMUSCULAR REEDUCATION: CPT | Performed by: PHYSICAL THERAPIST

## 2022-02-14 NOTE — PROGRESS NOTES
Daily Note     Today's date: 2022  Patient name: Jody Bryant  : 1945  MRN: [id number]  Referring provider: Cynthia Alvarez  Dx:   Encounter Diagnosis     ICD-10-CM    1  Cervicalgia  M54 2    2  New onset of headaches after age 49  R53 10        Start Time: 0845  Stop Time: 4055  Total time in clinic (min): 40 minutes    Subjective: Patient reports his neck is not great today reporting aching in the back of his neck since yesterday  Objective: See treatment diary below      Assessment: Tolerated treatment well  Patient continues to require cues for proper form with majority of TE to isolate proper musculature and avoid compensation  Initiated additional deep cervical strengthening and stabilization with no complaints of pain  Patient demonstrated fatigue post treatment, exhibited good technique with therapeutic exercises and would benefit from continued PT  Plan: Continue per plan of care  Precautions: Falls risk, brain injury, COPD, HTN, Aortic aneurysm repair       Manuals    SOR   Done Done Done Done  Done                                         Neuro Re-Ed            3 finger ROM                        Cervical flexion isometric  2x10x5" 2x10x5" 2x10x5'' 2x10x5''  2x10x5'' 2x10x5''  2x10x5"   Cervical extension isometric  2x10x5" 2x10x5" 2x10x5'' 2x10X5''  2x10x5'' 2x10x5''    Cervical SB isometric  2x10x5" 2x10x5" 2x10x5'' 2x10x5''  2x10x5'' 2x10x5'' 2x10x5"   Valsalva            Ball on wall - CT with rotation         10 ea   Ther Ex            UBE?   10W 5' retro 15W 5' retro 15W 5' retro 15W 5' retro 15W 5' retro 15W 5' retro 15W 5' retro 15W 5' retro               Chin tucks  2x10x5" 2x10x5" 2x10x5'' 2x10x5'' 3x10x5" 3x10x5'' 3x10x5'' 3x12x5"   Scapular retractions  2x10x5" 3x10x5" 3x10x5'' 3x10x5''  3x10x5'' 3x10x5'' 3x12               TB low rows  OTB 2x10 OTB 2x10 OTB 2x10 OTB 3x10 OTB 3x10 OTB 3x12 OTB 3x12 OTB 3x12 TB mid rows  GTB 2x10 GTB 2x10 GTB 3x10 GTB 3x10 GTB 3x10 GTB 3x12 GTB 3x12 GTB 3x12               Ther Activity                                    Gait Training                                    Modalities

## 2022-02-16 ENCOUNTER — EVALUATION (OUTPATIENT)
Dept: PHYSICAL THERAPY | Facility: REHABILITATION | Age: 77
End: 2022-02-16
Payer: MEDICARE

## 2022-02-16 DIAGNOSIS — R51.9 NEW ONSET OF HEADACHES AFTER AGE 50: ICD-10-CM

## 2022-02-16 DIAGNOSIS — M54.2 CERVICALGIA: Primary | ICD-10-CM

## 2022-02-16 PROCEDURE — 97110 THERAPEUTIC EXERCISES: CPT | Performed by: PHYSICAL THERAPIST

## 2022-02-16 PROCEDURE — 97112 NEUROMUSCULAR REEDUCATION: CPT | Performed by: PHYSICAL THERAPIST

## 2022-02-16 NOTE — PROGRESS NOTES
PT Re-Evaluation     Today's date: 2022  Patient name: Bryant Lake  : 1945  MRN: [id number]  Referring provider: Elin Calles  Dx:   Encounter Diagnosis     ICD-10-CM    1  Cervicalgia  M54 2    2  New onset of headaches after age 49  R53 10        Start Time: 0845  Stop Time: 0935  Total time in clinic (min): 50 minutes    Assessment  Assessment details: Rimma Muniz has made the following improvements since beginning PT: decreased pain, increased ROM, increased strength, increased postural control and awareness and increased tolerance to activities  Rimma Muniz presents with mild improvements in cervical range of motion and strength however continues to be moderately limited with cervical motion into extension, sidebending and rotation  He continues to present with poor posture and poor engagement of cervical extensors that is likely contributing most to his symptoms  Will continue to focus on cervical stability as tolerated  Rimma Muniz continues to present with the impairments as listed above  Patient would continue to benefit from skilled PT to address these deficits and to maximize function  Impairments: abnormal muscle tone, abnormal or restricted ROM, activity intolerance, impaired physical strength, lacks appropriate home exercise program, pain with function and poor posture   Barriers to therapy: Falls risk    Goals  Short-Term Goals:   1  Patient will demonstrate increased cervical ROM by 25% in 4 weeks  met   2  Patient will report reduction in frequency and intensity of pain in 4 weeks  met    Long-Term Goals:   1  Patient will demonstrate decreased frequency of shooting pain and headaches upon discharge  progressing  2  Patient will be independent with HEP at time of discharge  progressing  3  Patient will be able to bend over and lift with minimal to no pain at time of discharge  progressing        Plan  Plan details:  Thank you for opportunity to participate in the care of Kalin Whitlock Cat Navarro       Patient would benefit from: skilled physical therapy and PT eval  Planned modality interventions: cryotherapy and thermotherapy: hydrocollator packs  Planned therapy interventions: abdominal trunk stabilization, home exercise program, therapeutic activities, therapeutic exercise and strengthening  Frequency: 2x week  Duration in visits: 10  Plan of Care beginning date: 2022  Treatment plan discussed with: patient        Subjective Evaluation    History of Present Illness  Mechanism of injury: RE performed on 22  Ana Soto has been seen for a total of 10 visits for OP PT for chronic neck pain  Patient rates overall improvement since beginning PT 50%  Patient reports improvements with decreased frequency and decreased duration of pain  He feels a combination of medication and therapy have helped  He reports he is better able to bend forward to pick something up with less shooting pain and does not last as long  Patient reports most difficulty with continued episodes of pain mostly with coughing and sneezing but feels it is better if he sneezes into his elbow  PT goals: "help get rid of the pain"       Pain  Current pain ratin  At best pain ratin  At worst pain ratin  Quality: sharp  Relieving factors: medications and rest    Social Support  Lives with: spouse    Employment status: not working (retired)  Hand dominance: right    Treatments  Current treatment: medication and physical therapy  Patient Goals  Patient goals for therapy: decreased pain, increased motion and increased strength          Objective     Concurrent Complaints  Positive for tinnitus   Negative for night pain, disturbed sleep, dizziness, faints, headaches, nausea/motion sickness, trouble swallowing, difficulty breathing, shortness of breath, respiratory pain, visual change, cardiac problem, kidney problem, gallbladder problem, stomach problem, ulcer, appendix problem, spleen problem, pancreas problem, history of cancer, history of trauma and infection    Additional Special Questions  Has had full eye exam     Static Posture     Head  Forward  Shoulders  Rounded  Thoracic Spine  Hyperkyphosis  Postural Observations  Seated posture: poor  Standing posture: poor        Palpation   Left   No palpable tenderness to the suboccipitals  Tenderness of the upper trapezius  Right   No palpable tenderness to the suboccipitals  Tenderness of the upper trapezius  Active Range of Motion   Cervical/Thoracic Spine       Cervical    Flexion: 70 degrees  with pain  Extension: 15 degrees      Left lateral flexion: 25 degrees      Right lateral flexion: 15 (vertex) degrees     with pain    Additional Active Range of Motion Details  RE performed on 02/16/22  Forward head - resting in 10* flexion  Seated cervical rotation 50% bilaterally    Forward head - resting in 15* flexion  Seated cervical rotation 30% bilaterally     Strength/Myotome Testing   Cervical Spine   Neck extension: 4  Neck flexion: 4    Left   Neck lateral flexion (C3): 4    Right   Neck lateral flexion (C3): 4    Left Shoulder     Planes of Motion   Flexion: 4-   Abduction: 4-   External rotation at 0°: 4-   Internal rotation at 0°: 4-     Isolated Muscles   Upper trapezius: 5     Right Shoulder     Planes of Motion   Flexion: 4-   Abduction: 4-   External rotation at 0°: 4-   Internal rotation at 0°: 4-     Isolated Muscles   Upper trapezius: 5     Left Elbow   Flexion: 4  Extension: 4    Right Elbow   Flexion: 4  Extension: 4    Tests   Cervical   Positive neck flexor muscle endurance test   Negative vertical compression  Left   Negative Spurling's Test A  Right   Negative Spurling's Test A  Lumbar   Negative vertical compression       Additional Tests Details  DNFE 8" - pain up to the top of his head and left eye post-test  Neuro Exam:     Headaches   Patient reports headaches: No               Precautions: Falls risk, brain injury, COPD, HTN, Aortic aneurysm repair       Manuals 2/16 1/24 1/26 1/31 2/2 2/7 2/9 2/14   SOR   Done Done Done Done  Done                                         Neuro Re-Ed            3 finger ROM                        Cervical flexion isometric 2x10x5"  2x10x5" 2x10x5'' 2x10x5''  2x10x5'' 2x10x5''  2x10x5"   Cervical extension isometric   2x10x5" 2x10x5'' 2x10X5''  2x10x5'' 2x10x5''    Cervical SB isometric 2x10x5"  2x10x5" 2x10x5'' 2x10x5''  2x10x5'' 2x10x5'' 2x10x5"   Valsalva            Ball on wall - CT with rotation         10 ea   Ball on wall cervical flexion 2x10           Ther Ex            UBE?  15W 5'  15W 5' retro 15W 5' retro 15W 5' retro 15W 5' retro 15W 5' retro 15W 5' retro 15W 5' retro               Chin tucks 3x12x5"  2x10x5" 2x10x5'' 2x10x5'' 3x10x5" 3x10x5'' 3x10x5'' 3x12x5"   Scapular retractions 3x12  3x10x5" 3x10x5'' 3x10x5''  3x10x5'' 3x10x5'' 3x12               TB low rows OTB 3x12  OTB 2x10 OTB 2x10 OTB 3x10 OTB 3x10 OTB 3x12 OTB 3x12 OTB 3x12   TB mid rows GTB 3x12  GTB 2x10 GTB 3x10 GTB 3x10 GTB 3x10 GTB 3x12 GTB 3x12 GTB 3x12   TB resisted cervical extension nv           Ther Activity                                    Gait Training                                    Modalities

## 2022-02-21 ENCOUNTER — OFFICE VISIT (OUTPATIENT)
Dept: PHYSICAL THERAPY | Facility: REHABILITATION | Age: 77
End: 2022-02-21
Payer: MEDICARE

## 2022-02-21 DIAGNOSIS — R51.9 NEW ONSET OF HEADACHES AFTER AGE 50: ICD-10-CM

## 2022-02-21 DIAGNOSIS — M54.2 CERVICALGIA: Primary | ICD-10-CM

## 2022-02-21 PROCEDURE — 97110 THERAPEUTIC EXERCISES: CPT

## 2022-02-21 PROCEDURE — 97112 NEUROMUSCULAR REEDUCATION: CPT

## 2022-02-21 NOTE — PROGRESS NOTES
Daily Note     Today's date: 2022  Patient name: Jeevan Crain  : 1945  MRN: [id number]  Referring provider: Immanuel Osullivan  Dx:   Encounter Diagnosis     ICD-10-CM    1  Cervicalgia  M54 2    2  New onset of headaches after age 49  R53 10        Start Time: 845  Stop Time: 928  Total time in clinic (min): 43 minutes    Subjective:  Patient reports having increased neck pain yesterday, reporting relief today  Objective: See treatment diary below      Assessment: Tolerated treatment well  Patient demonstrated fatigue post treatment, exhibited good technique with therapeutic exercises and would benefit from continued PT Trialed TB resisted cervical extension, no pain reported, however difficulty noted with balance, CG from therapist required occasionally to steady  Plan: Continue per plan of care  Progress treatment as tolerated  Precautions: Falls risk, brain injury, COPD, HTN, Aortic aneurysm repair       Manuals    SOR     Done Done  Done                                         Neuro Re-Ed            3 finger ROM                        Cervical flexion isometric 2x10x5" 2x10x5''   2x10x5''  2x10x5'' 2x10x5''  2x10x5"   Cervical extension isometric     2x10X5''  2x10x5'' 2x10x5''    Cervical SB isometric 2x10x5" 2x10x5''   2x10x5''  2x10x5'' 2x10x5'' 2x10x5"   Valsalva            Ball on wall - CT with rotation         10 ea   Ball on wall cervical flexion 2x10 2x10           Ther Ex            UBE?  15W 5' 15W 5'   15W 5' retro 15W 5' retro 15W 5' retro 15W 5' retro 15W 5' retro               Chin tucks 3x12x5" 3x12x5''   2x10x5'' 3x10x5" 3x10x5'' 3x10x5'' 3x12x5"   Scapular retractions 3x12 3x12   3x10x5''  3x10x5'' 3x10x5'' 3x12               TB low rows OTB 3x12 OTB 3x15   OTB 3x10 OTB 3x10 OTB 3x12 OTB 3x12 OTB 3x12   TB mid rows GTB 3x12 GTB 3x15   GTB 3x10 GTB 3x10 GTB 3x12 GTB 3x12 GTB 3x12   TB resisted cervical extension nv OTB 2x10          Ther Activity                                    Gait Training                                    Modalities

## 2022-02-23 ENCOUNTER — OFFICE VISIT (OUTPATIENT)
Dept: PHYSICAL THERAPY | Facility: REHABILITATION | Age: 77
End: 2022-02-23
Payer: MEDICARE

## 2022-02-23 DIAGNOSIS — M54.2 CERVICALGIA: Primary | ICD-10-CM

## 2022-02-23 DIAGNOSIS — R51.9 NEW ONSET OF HEADACHES AFTER AGE 50: ICD-10-CM

## 2022-02-23 PROCEDURE — 97110 THERAPEUTIC EXERCISES: CPT

## 2022-02-23 PROCEDURE — 97112 NEUROMUSCULAR REEDUCATION: CPT

## 2022-02-28 ENCOUNTER — OFFICE VISIT (OUTPATIENT)
Dept: PHYSICAL THERAPY | Facility: REHABILITATION | Age: 77
End: 2022-02-28
Payer: MEDICARE

## 2022-02-28 DIAGNOSIS — R51.9 NEW ONSET OF HEADACHES AFTER AGE 50: ICD-10-CM

## 2022-02-28 DIAGNOSIS — M54.2 CERVICALGIA: Primary | ICD-10-CM

## 2022-02-28 PROCEDURE — 97112 NEUROMUSCULAR REEDUCATION: CPT

## 2022-02-28 PROCEDURE — 97110 THERAPEUTIC EXERCISES: CPT

## 2022-02-28 NOTE — PROGRESS NOTES
Daily Note     Today's date: 2022  Patient name: Jody Bryant  : 1945  MRN: [id number]  Referring provider: Cynthia Alvraez  Dx:   Encounter Diagnosis     ICD-10-CM    1  Cervicalgia  M54 2    2  New onset of headaches after age 49  R53 10        Start Time: 08  Stop Time: 935  Total time in clinic (min): 50 minutes    Subjective: Patient reporting feeling "okay" at start of session, reporting no "real headaches" over the weekend  He reports occasional pain on the top of his head  Objective: See treatment diary below      Assessment: Tolerated treatment well  Patient demonstrated fatigue post treatment, exhibited good technique with therapeutic exercises and would benefit from continued PT Patient reports no pain with any TE performed, fatigue only  Certain TE not performed secondary to patient requesting to leave session, will continue to progress patient as able next visit  Plan: Continue per plan of care  Progress treatment as tolerated  Precautions: Falls risk, brain injury, COPD, HTN, Aortic aneurysm repair       Manuals    SOR      Done  Done                                         Neuro Re-Ed            3 finger ROM                        Cervical flexion isometric 2x10x5" 2x10x5'' 2x10x5'' 2x10x5''   2x10x5'' 2x10x5''  2x10x5"   Cervical extension isometric       2x10x5'' 2x10x5''    Cervical SB isometric 2x10x5" 2x10x5'' 2x10x5'' 2x10x5''   2x10x5'' 2x10x5'' 2x10x5"   Valsalva            Ball on wall - CT with rotation         10 ea   Ball on wall cervical flexion 2x10 2x10  2x10  nv        Ther Ex            UBE?  15W 5' 15W 5' np 10W 5'  15W 5' retro 15W 5' retro 15W 5' retro 15W 5' retro               Chin tucks 3x12x5" 3x12x5'' 3x12x5'' 3x15x5''  3x10x5" 3x10x5'' 3x10x5'' 3x12x5"   Scapular retractions 3x12 3x12 3x12 3x15   3x10x5'' 3x10x5'' 3x12               TB low rows OTB 3x12 OTB 3x15 OTB 3x15 GTB 3x10  OTB 3x10 OTB 3x12 OTB 3x12 OTB 3x12   TB mid rows GTB 3x12 GTB 3x15 GTB 3x15 GTB 3x15  GTB 3x10 GTB 3x12 GTB 3x12 GTB 3x12   TB resisted cervical extension nv OTB 2x10 OTB 2x10 nv        Ther Activity                                    Gait Training                                    Modalities

## 2022-03-02 ENCOUNTER — OFFICE VISIT (OUTPATIENT)
Dept: PHYSICAL THERAPY | Facility: REHABILITATION | Age: 77
End: 2022-03-02
Payer: MEDICARE

## 2022-03-02 DIAGNOSIS — M54.2 CERVICALGIA: Primary | ICD-10-CM

## 2022-03-02 DIAGNOSIS — R51.9 NEW ONSET OF HEADACHES AFTER AGE 50: ICD-10-CM

## 2022-03-02 PROCEDURE — 97112 NEUROMUSCULAR REEDUCATION: CPT

## 2022-03-02 PROCEDURE — 97110 THERAPEUTIC EXERCISES: CPT

## 2022-03-02 NOTE — PROGRESS NOTES
Daily Note     Today's date: 3/2/2022  Patient name: Brandon Light  : 1945  MRN: [id number]  Referring provider: Mike Trevino  Dx:   Encounter Diagnosis     ICD-10-CM    1  Cervicalgia  M54 2    2  New onset of headaches after age 49  R53 10        Start Time: 0845  Stop Time: 930  Total time in clinic (min): 45 minutes    Subjective: Patient reports "yesterday was an excellent day", stating no complaints at start of session  Objective: See treatment diary below      Assessment: Tolerated treatment well  Patient demonstrated fatigue post treatment, exhibited good technique with therapeutic exercises and would benefit from continued PT  Cues required for proper form with ball on wall cervical flexion to avoid use of whole body, cervical motion only  Plan: Continue per plan of care  Progress treatment as tolerated  Precautions: Falls risk, brain injury, COPD, HTN, Aortic aneurysm repair       Manuals 2/16 2/21 2/23 2/28 3/2  2/7 2/9 2/14   SOR       Done                                         Neuro Re-Ed            3 finger ROM                        Cervical flexion isometric 2x10x5" 2x10x5'' 2x10x5'' 2x10x5'' 2x10x5''  2x10x5'' 2x10x5''  2x10x5"   Cervical extension isometric       2x10x5'' 2x10x5''    Cervical SB isometric 2x10x5" 2x10x5'' 2x10x5'' 2x10x5'' 2x10x5''  2x10x5'' 2x10x5'' 2x10x5"   Valsalva            Ball on wall - CT with rotation         10 ea   Ball on wall cervical flexion 2x10 2x10  2x10  nv 2x10        Ther Ex            UBE?  15W 5' 15W 5' np 10W 5' 15W 5'  15W 5' retro 15W 5' retro 15W 5' retro               Chin tucks 3x12x5" 3x12x5'' 3x12x5'' 3x15x5'' 3x15x5''  3x10x5'' 3x10x5'' 3x12x5"   Scapular retractions 3x12 3x12 3x12 3x15 3x15  3x10x5'' 3x10x5'' 3x12               TB low rows OTB 3x12 OTB 3x15 OTB 3x15 GTB 3x10 GTB 3x10  OTB 3x12 OTB 3x12 OTB 3x12   TB mid rows GTB 3x12 GTB 3x15 GTB 3x15 GTB 3x15 BTB 3x10  GTB 3x12 GTB 3x12 GTB 3x12   TB resisted cervical extension nv OTB 2x10 OTB 2x10 nv        Ther Activity                                    Gait Training                                    Modalities

## 2022-03-08 ENCOUNTER — APPOINTMENT (OUTPATIENT)
Dept: PHYSICAL THERAPY | Facility: REHABILITATION | Age: 77
End: 2022-03-08
Payer: MEDICARE

## 2022-03-15 ENCOUNTER — APPOINTMENT (OUTPATIENT)
Dept: PHYSICAL THERAPY | Facility: REHABILITATION | Age: 77
End: 2022-03-15
Payer: MEDICARE

## 2022-03-17 ENCOUNTER — HOSPITAL ENCOUNTER (OUTPATIENT)
Dept: NON INVASIVE DIAGNOSTICS | Facility: CLINIC | Age: 77
Discharge: HOME | End: 2022-03-17
Payer: MEDICARE

## 2022-03-17 DIAGNOSIS — I71.4 ABDOMINAL AORTIC ANEURYSM (AAA) WITHOUT RUPTURE (HCC): ICD-10-CM

## 2022-03-17 PROCEDURE — 93978 VASCULAR STUDY: CPT | Performed by: SURGERY

## 2022-03-17 PROCEDURE — 93923 UPR/LXTR ART STDY 3+ LVLS: CPT

## 2022-03-17 PROCEDURE — 93978 VASCULAR STUDY: CPT

## 2022-03-21 ENCOUNTER — OFFICE VISIT (OUTPATIENT)
Dept: PHYSICAL THERAPY | Facility: REHABILITATION | Age: 77
End: 2022-03-21
Payer: MEDICARE

## 2022-03-21 ENCOUNTER — HOSPITAL ENCOUNTER (OUTPATIENT)
Dept: NON INVASIVE DIAGNOSTICS | Facility: CLINIC | Age: 77
Discharge: HOME | End: 2022-03-21
Payer: MEDICARE

## 2022-03-21 DIAGNOSIS — R51.9 NEW ONSET OF HEADACHES AFTER AGE 50: ICD-10-CM

## 2022-03-21 DIAGNOSIS — I73.9 PAD (PERIPHERAL ARTERY DISEASE) (HCC): ICD-10-CM

## 2022-03-21 DIAGNOSIS — I72.4 ANEURYSM OF POPLITEAL ARTERY (HCC): ICD-10-CM

## 2022-03-21 DIAGNOSIS — I65.23 BILATERAL CAROTID ARTERY STENOSIS: ICD-10-CM

## 2022-03-21 DIAGNOSIS — M54.2 CERVICALGIA: Primary | ICD-10-CM

## 2022-03-21 PROCEDURE — 93880 EXTRACRANIAL BILAT STUDY: CPT | Performed by: SURGERY

## 2022-03-21 PROCEDURE — 97110 THERAPEUTIC EXERCISES: CPT | Performed by: PHYSICAL THERAPIST

## 2022-03-21 PROCEDURE — 97530 THERAPEUTIC ACTIVITIES: CPT | Performed by: PHYSICAL THERAPIST

## 2022-03-21 PROCEDURE — 93923 UPR/LXTR ART STDY 3+ LVLS: CPT

## 2022-03-21 PROCEDURE — 93925 LOWER EXTREMITY STUDY: CPT

## 2022-03-21 PROCEDURE — 93925 LOWER EXTREMITY STUDY: CPT | Performed by: SURGERY

## 2022-03-21 PROCEDURE — 93880 EXTRACRANIAL BILAT STUDY: CPT

## 2022-03-21 PROCEDURE — 93922 UPR/L XTREMITY ART 2 LEVELS: CPT | Performed by: SURGERY

## 2022-03-21 NOTE — PROGRESS NOTES
Daily Note/Discharge Note     Today's date: 3/21/2022  Patient name: Korina Mahan  : 1945  MRN: [id number]  Referring provider: Johanna Carter  Dx:   Encounter Diagnosis     ICD-10-CM    1  Cervicalgia  M54 2    2  New onset of headaches after age 49  R53 10        Start Time: 1216  Stop Time: 1255  Total time in clinic (min): 39 minutes    Subjective: Patient reports his neck has been feeling pretty good over the last few weeks  He has not had much pain  He reports he can get some pain to the top of his head with a sneeze but otherwise it has been pretty good  He had vascular testing done the last two days  He has a follow up with neurology in early April  Objective: See treatment diary below      Assessment: Lisandro Aragon has made the following improvements since beginning PT: decreased pain, increased ROM, increased strength, increased postural control and awareness and increased tolerance to activities  Lisandro Aragon and PT mutually agree to transition to HEP at this time secondary to gains made in PT  he has been given updated HEP with verbalized understanding and compliance  Lisandro Aragon is encouraged to contact PT with any questions or concerns in the future  Plan: Discharge patient with comprehensive HEP  Precautions: Falls risk, brain injury, COPD, HTN, Aortic aneurysm repair       Manuals 2/16 2/21 2/23 2/28 3/2 3/21  2/9 2/14   [facility]                                                Neuro Re-Ed            3 finger ROM                        Cervical flexion isometric 2x10x5" 2x10x5'' 2x10x5'' 2x10x5'' 2x10x5'' 2x10x5"  2x10x5''  2x10x5"   Cervical extension isometric        2x10x5''    Cervical SB isometric 2x10x5" 2x10x5'' 2x10x5'' 2x10x5'' 2x10x5'' 2x10x5"  2x10x5'' 2x10x5"   Valsalva            Ball on wall - CT with rotation         10 ea   Ball on wall cervical flexion 2x10 2x10  2x10  nv 2x10  2x10      Ther Ex            UBE?  15W 5' 15W 5' np 10W 5' 15W 5' 15W 5'  15W 5' retro 15W 5' retro               Chin tucks 3x12x5" 3x12x5'' 3x12x5'' 3x15x5'' 3x15x5'' 3x15x5"  3x10x5'' 3x12x5"   Scapular retractions 3x12 3x12 3x12 3x15 3x15 np  3x10x5'' 3x12               TB low rows OTB 3x12 OTB 3x15 OTB 3x15 GTB 3x10 GTB 3x10 GTB 3x10  OTB 3x12 OTB 3x12   TB mid rows GTB 3x12 GTB 3x15 GTB 3x15 GTB 3x15 BTB 3x10 BTB 3x10  GTB 3x12 GTB 3x12   TB resisted cervical extension nv OTB 2x10 OTB 2x10 nv        Ther Activity                                    Gait Training                                    Modalities

## 2022-03-31 ENCOUNTER — OFFICE VISIT (OUTPATIENT)
Dept: SLEEP CENTER | Facility: CLINIC | Age: 77
End: 2022-03-31
Payer: MEDICARE

## 2022-03-31 VITALS
DIASTOLIC BLOOD PRESSURE: 50 MMHG | BODY MASS INDEX: 28.42 KG/M2 | HEIGHT: 71 IN | SYSTOLIC BLOOD PRESSURE: 108 MMHG | WEIGHT: 203 LBS | HEART RATE: 72 BPM | OXYGEN SATURATION: 95 %

## 2022-03-31 DIAGNOSIS — G47.33 OBSTRUCTIVE SLEEP APNEA (ADULT) (PEDIATRIC): ICD-10-CM

## 2022-03-31 PROCEDURE — 99214 OFFICE O/P EST MOD 30 MIN: CPT | Performed by: INTERNAL MEDICINE

## 2022-03-31 RX ORDER — AMOXICILLIN AND CLAVULANATE POTASSIUM 875; 125 MG/1; MG/1
1 TABLET, FILM COATED ORAL 2 TIMES DAILY
COMMUNITY
Start: 2022-03-14

## 2022-03-31 NOTE — PROGRESS NOTES
Consultation - [address]  : 1945  MRN: [id number]      Assessment:  The patient has a previous history of obstructive sleep apnea with AHI=36 in 2018  He stopped using his current machine as part of the recall  He feels that he needs to use his machine once again and I will order him a new one  I will also change the setting from CPAP 9 cm to APAP 9 to 15 cm  He is aware that he may require another sleep study for diagnosis  Plan:  New APAP 9 to 15 cm    Follow up:  Compliance check    History of Present Illness:   65 y o male the patient has a history of severe obstructive sleep apnea diagnosed in 2018  He had a subsequent positive airway pressure titration to 9 cm  He discontinued his CPAP two years ago as part of the manufacture recall  He reports increasing dreaming and increasing drowsiness since stopping his device  He also has a history of hypertension  His wife reports that he stops breathing  He feels drowsy during the day and has headaches in the morning          Review of Systems      Genitourinary none   Cardiology none   Gastrointestinal none   Neurology frequent headaches, forgetfulness and poor concentration or confusion,    Constitutional fatigue and weight change   Integumentary rash or dry skin and itching   Psychiatry mood change   Musculoskeletal muscle aches   Pulmonary shortness of breath with activity, frequent cough and snoring   ENT throat clearing and ringing in ears   Endocrine none   Hematological none           I have reviewed and updated the review of systems as necessary    Historical Information    Past Medical History:  Hypertension, GERD, peripheral arterial disease, hypertension    Family History: non-contributory    Social History     Socioeconomic History    Marital status: /Civil Union     Spouse name: Not on file    Number of children: Not on file    Years of education: Not on file    Highest education level: Not on file   Occupational History    Not on file   Tobacco Use    Smoking status: Former Smoker     Packs/day: 1 00     Years: 24 00     Pack years: 24 00     Types: Cigarettes     Start date: 1959     Quit date: 1983     Years since quittin 9    Smokeless tobacco: Never Used   Vaping Use    Vaping Use: Never used   Substance and Sexual Activity    Alcohol use: No    Drug use: No    Sexual activity: Never   Other Topics Concern    Not on file   Social History Narrative    Not on file     Social Determinants of Health     Financial Resource Strain: Not on file   Food Insecurity: Not on file   Transportation Needs: Not on file   Physical Activity: Not on file   Stress: Not on file   Social Connections: Not on file   Intimate Partner Violence: Not on file   Housing Stability: Not on file         Sleep Schedule: unremarkable    Snoring:  Yes    Witnessed Apnea:  Yes    Medications/Allergies:    Current Outpatient Medications:     acetaminophen (TYLENOL) 325 mg tablet, Take 3 tablets (975 mg total) by mouth every 8 (eight) hours as needed for mild pain, Disp: , Rfl: 0    ALPRAZolam (XANAX) 0 25 mg tablet, Take 1 tablet (0 25 mg total) by mouth daily at bedtime as needed for anxiety, Disp: 90 tablet, Rfl: 3    aspirin 81 MG tablet, Take 1 tablet by mouth daily  , Disp: , Rfl:     atorvastatin (LIPITOR) 40 mg tablet, Take 40 mg by mouth daily, Disp: , Rfl: 0    Cholecalciferol (Vitamin D3) 25 MCG (1000 UT) CAPS, Take 1,000 Units by mouth daily, Disp: , Rfl:     DULoxetine (CYMBALTA) 60 mg delayed release capsule, Take 1 capsule (60 mg total) by mouth daily, Disp: 90 capsule, Rfl: 3    labetalol (NORMODYNE) 200 mg tablet, Take 100 mg by mouth 2 (two) times a day, Disp: , Rfl:     verapamil (CALAN) 40 mg tablet, take 1 tablet by mouth twice a day, Disp: 60 tablet, Rfl: 4    amoxicillin-clavulanate (AUGMENTIN) 875-125 mg per tablet, Take 1 tablet by mouth 2 (two) times a day (Patient not taking: Reported on 3/31/2022 ), Disp: , Rfl:         No notes on file                  Objective:    Vital Signs:   Vitals:    03/31/22 1000   BP: 108/50   Pulse: 72   SpO2: 95%   Weight: 92 1 kg (203 lb)   Height: 5' 11" (1 803 m)     Neck Circumference: 18      Mercer Sleepiness Scale: Total score: 5    Physical Exam:    General: Alert, appropriate, cooperative, normal build    Head: NC/AT, no retrognathia    Nose: No septal deviation, nares not obstructed, mucosa normal    Throat: Airway diminished, tongue base thickened, no tonsils visualized    Neck: Normal, no thyromegaly or lymphadenopathy, no JVD    Heart: RR, normal S1 and S2, no murmurs    Chest: Clear bilaterally    Extremity: No clubbing, cyanosis, no edema    Skin: Warm, dry    Neuro: No motor abnormalities, cranial nerves appear intact    Sleep Study Results:   AHI = 36  PAP Pressure: CPAP: 9 cm  DME Provider: [employer] [facility]    Counseling / Coordination of Care  A description of the counseling / coordination of care: We discussed the pathophysiology of obstructive sleep apnea as well as the possible treatment options  We also discussed the rationale for positive airway pressure therapy  Board Certified Sleep Specialist    Portions of the record may have been created with voice recognition software  Occasional wrong word or "sound a like" substitutions may have occurred due to the inherent limitations of voice recognition software  Read the chart carefully and recognize, using context, where substitutions have occurred

## 2022-04-04 ENCOUNTER — TELEPHONE (OUTPATIENT)
Dept: PSYCHIATRY | Facility: CLINIC | Age: 77
End: 2022-04-04

## 2022-04-04 ENCOUNTER — TELEPHONE (OUTPATIENT)
Dept: NEUROLOGY | Facility: CLINIC | Age: 77
End: 2022-04-04

## 2022-04-04 NOTE — TELEPHONE ENCOUNTER
Call transferred in - patient and wife asking if the patient is to continue verapamil if Trupti Murphy is leaving  I did advised them to continue taking the medication as prescribed and [facility] will take over script  Patient is to call with any side effects, dissatisfaction with medication, etc      Patient and wife both verbalize understanding

## 2022-04-19 ENCOUNTER — TELEPHONE (OUTPATIENT)
Dept: SLEEP CENTER | Facility: CLINIC | Age: 77
End: 2022-04-19

## 2022-04-19 NOTE — TELEPHONE ENCOUNTER
Spoke to patient and advised his DME set up scheduled 4/21/22 has been canceled  Patient states he was unaware of the appointment     Advised patient he is not eligible for a replacement of his recalled machine through [facility]  He will need to register his machine with [facility] to obtain a replacement  Patient verbalized understanding  Offered to provide patient with phone number and email for [facility] but patient states he cannot take down that information at this time  He will call back

## 2022-04-19 NOTE — TELEPHONE ENCOUNTER
Patient is scheduled for a DME setup Per your notes, he stopped using the machine due to the recall  Unfortunately we cannot replace that one for him  He will have to wait for [facility] to send him one  This DME setup needs to be canceled  Thank you

## 2022-04-28 ENCOUNTER — OFFICE VISIT (OUTPATIENT)
Dept: VASCULAR SURGERY | Facility: CLINIC | Age: 77
End: 2022-04-28
Payer: MEDICARE

## 2022-04-28 VITALS
DIASTOLIC BLOOD PRESSURE: 66 MMHG | BODY MASS INDEX: 28.84 KG/M2 | WEIGHT: 206 LBS | HEIGHT: 71 IN | SYSTOLIC BLOOD PRESSURE: 140 MMHG | HEART RATE: 61 BPM

## 2022-04-28 DIAGNOSIS — I65.23 BILATERAL CAROTID ARTERY STENOSIS: ICD-10-CM

## 2022-04-28 DIAGNOSIS — I71.4 ABDOMINAL AORTIC ANEURYSM (AAA) WITHOUT RUPTURE (HCC): Primary | ICD-10-CM

## 2022-04-28 DIAGNOSIS — I72.4 ANEURYSM OF POPLITEAL ARTERY (HCC): ICD-10-CM

## 2022-04-28 DIAGNOSIS — I73.9 PAD (PERIPHERAL ARTERY DISEASE) (HCC): ICD-10-CM

## 2022-04-28 PROCEDURE — 99213 OFFICE O/P EST LOW 20 MIN: CPT | Performed by: NURSE PRACTITIONER

## 2022-04-28 NOTE — PROGRESS NOTES
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, celiac artery measures 1 1 cm, right ULISES 1 19 and left ULISES 1 26   -Blood pressure adequately controlled   -Continue aspirin and statin therapy for best medical management   -Repeat duplex in 1 year   -Follow up in the office in 1 year    Bilateral carotid artery stenosis  -CV duplex 3/21/22 showed bilateral ICA less 50% stenosis, left common carotid to subclavian artery bypass widely patent   -Continue aspirin and Lipitor   -Repeat duplex in 1 year  -Follow up in the office in 1 year    Aneurysm of popliteal artery ([facility] [address] )  -LEAD 3/21/22 showed BLE mild diffuse disease without focal stenosis, right ULISES 1 19/141/104 and left ULISES 1 26/207/102  Bilateral popliteal artery measures 1 1 cm   -Repeat duplex in 1 year   -Follow up in the office in 1 year       Diagnoses and all orders for this visit:    Abdominal aortic aneurysm (AAA) without rupture (HCC)  -     VAS lower limb arterial duplex, complete bilateral; Future  -     VAS abdominal aorta/iliacs; complete study; Future    Bilateral carotid artery stenosis  -     VAS carotid complete study; Future    PAD (peripheral artery disease) (HCC)  -     VAS lower limb arterial duplex, complete bilateral; Future    Aneurysm of popliteal artery (HCC)  -     VAS lower limb arterial duplex, complete bilateral; Future          Subjective:      Patient ID: Tobin Conway  is a 65 y o  male  Pt is here to rev CV 3/21/22,GERMAN 3/21/22 and AOIL 3/17/22  Pt denies TIA stroke symptoms, claudication, back pain and ABD pain  Pt is taking ASA and Atorvastatin   Pt is a former smoker  HPI  65year old male with HTN, HLD, type B aortic dissection status post TEVAR 2011, left subclavian to carotid bypass in preparation of TEVAR, mild bilateral carotid stenosis, AAA, PAD, bilateral popliteal ectasia to returns to the office to review CV/AOIL/LEAD  Patient denies any significant claudication symptoms  No abdominal pain  Denies any focal neurological events consistent with TIA/CVA  Blood pressure adequately controlled  Maintained on aspirin 81 mg and atorvastatin 40 mg  AOIL 3/17/22 showed abdominal aorta 3 3 cm, chronic dissection renal aorta to bifurcation, celiac artery measures 1 1 cm, right ULISES 1 19 and left ULISES 1 26   LEAD 3/21/22 showed BLE mild diffuse disease without focal stenosis, right ULISES 1 19/141/104 and left ULISES 1 26/207/102  Bilateral popliteal artery measures 1 1 cm   CV duplex 3/21/22 showed bilateral ICA less 50% stenosis, left common carotid to subclavian artery bypass widely paten    The following portions of the patient's history were reviewed and updated as appropriate: allergies, current medications, past family history, past medical history, past social history, past surgical history and problem list   Review of systems reviewed    Review of Systems   Constitutional: Negative  HENT: Negative  Eyes: Negative  Respiratory: Negative  Cardiovascular: Negative  Gastrointestinal: Negative  Endocrine: Negative  Genitourinary: Negative  Musculoskeletal: Negative  Allergic/Immunologic: Negative  Neurological: Negative  Hematological: Negative  Psychiatric/Behavioral: Negative  Objective:  I have reviewed and made appropriate changes to the review of systems input by the medical assistant      Vitals:    04/28/22 0935   BP: 140/66   BP Location: Right arm   Patient Position: Sitting   Cuff Size: Standard   Pulse: 61   Weight: 93 4 kg (206 lb)   Height: 5' 11" (1 803 m)       Patient Active Problem List   Diagnosis    Recurrent major depression in full remission ([place] [address] )    Renal cyst, acquired    Obstructive sleep apnea (adult) (pediatric)    Aneurysm of abdominal aorta ([place] [address] )    Carotid atherosclerosis    Hypertension    Aneurysm of popliteal artery (HCC)    Hyperlipidemia    Thoracic aortic aneurysm without rupture ([place] [address] )    Bilateral carotid artery stenosis    PAD (peripheral artery disease) ([place] [address] )    Hx of adenomatous colonic polyps    Gastroesophageal reflux    Change in bowel habits    Overweight    Brain injury ([place] [address] )    COPD (chronic obstructive pulmonary disease) ([place] [address] )    Anxiety    Internal hemorrhoids    Diverticulosis    New onset of headaches after age 48    Dizziness and giddiness     Other specified disorders of arteries and arterioles ([facility])        Past Surgical History:   Procedure Laterality Date    ABDOMINAL AORTIC ANEURYSM REPAIR      about 20 years ago    CELIAC ARTERY STENT      COLONOSCOPY      KIDNEY STONE SURGERY         Family History   Problem Relation Age of Onset    Stroke Mother     Prostate cancer Father        Social History     Socioeconomic History    Marital status: /Civil Union     Spouse name: Not on file    Number of children: Not on file    Years of education: Not on file    Highest education level: Not on file   Occupational History    Not on file   Tobacco Use    Smoking status: Former Smoker     Packs/day: 1 00     Years: 24 00     Pack years: 24 00     Types: Cigarettes     Start date: 1959     Quit date: 1983     Years since quittin 0    Smokeless tobacco: Never Used   Vaping Use    Vaping Use: Never used   Substance and Sexual Activity    Alcohol use: No    Drug use: No    Sexual activity: Never   Other Topics Concern    Not on file   Social History Narrative    Not on file     Social Determinants of Health     Financial Resource Strain: Not on file   Food Insecurity: Not on file   Transportation Needs: Not on file   Physical Activity: Not on file   Stress: Not on file   Social Connections: Not on file   Intimate Partner Violence: Not on file   Housing Stability: Not on file       Allergies   Allergen Reactions    Lorazepam Hyperactivity         Current Outpatient Medications:     ALPRAZolam (XANAX) 0 25 mg tablet, Take 1 tablet (0 25 mg total) by mouth daily at bedtime as needed for anxiety, Disp: 90 tablet, Rfl: 1    aspirin 81 MG tablet, Take 1 tablet by mouth daily  , Disp: , Rfl:     atorvastatin (LIPITOR) 40 mg tablet, Take 40 mg by mouth daily, Disp: , Rfl: 0    Cholecalciferol (Vitamin D3) 25 MCG (1000 UT) CAPS, Take 1,000 Units by mouth daily, Disp: , Rfl:     DULoxetine (CYMBALTA) 60 mg delayed release capsule, Take 1 capsule (60 mg total) by mouth daily, Disp: 90 capsule, Rfl: 3    labetalol (NORMODYNE) 200 mg tablet, Take 100 mg by mouth 2 (two) times a day, Disp: , Rfl:     verapamil (CALAN) 40 mg tablet, take 1 tablet by mouth twice a day, Disp: 60 tablet, Rfl: 4    acetaminophen (TYLENOL) 325 mg tablet, Take 3 tablets (975 mg total) by mouth every 8 (eight) hours as needed for mild pain (Patient not taking: Reported on 4/28/2022 ), Disp: , Rfl: 0    amoxicillin-clavulanate (AUGMENTIN) 875-125 mg per tablet, Take 1 tablet by mouth 2 (two) times a day (Patient not taking: Reported on 3/31/2022 ), Disp: , Rfl:     /66 (BP Location: Right arm, Patient Position: Sitting, Cuff Size: Standard)   Pulse 61   Ht 5' 11" (1 803 m)   Wt 93 4 kg (206 lb)   BMI 28 73 kg/m²          Physical Exam  Vitals and nursing note reviewed  Constitutional:       Appearance: He is well-developed  HENT:      Head: Normocephalic and atraumatic  Eyes:      Extraocular Movements: Extraocular movements intact  Neck:      Vascular: No carotid bruit  Cardiovascular:      Pulses:           Femoral pulses are 2+ on the right side and 2+ on the left side  Popliteal pulses are 2+ on the right side and 2+ on the left side  Dorsalis pedis pulses are 2+ on the right side and 2+ on the left side  Heart sounds: Normal heart sounds  Abdominal:      Palpations: Abdomen is soft  Musculoskeletal:         General: Normal range of motion  Cervical back: Neck supple  Skin:     General: Skin is warm  Neurological:      General: No focal deficit present  Mental Status: He is alert and oriented to person, place, and time     Psychiatric:         Mood and Affect: Mood normal          Behavior: Behavior normal

## 2022-05-13 NOTE — ASSESSMENT & PLAN NOTE
-LEAD 3/21/22 showed BLE mild diffuse disease without focal stenosis, right ULISES 1 19/141/104 and left ULISES 1 26/207/102    Bilateral popliteal artery measures 1 1 cm   -Repeat duplex in 1 year   -Follow up in the office in 1 year

## 2022-05-13 NOTE — ASSESSMENT & PLAN NOTE
65year old male with HTN, HLD, type B aortic dissection status post TEVAR 2011, left subclavian to carotid bypass in preparation of TEVAR, mild bilateral carotid stenosis, AAA, PAD, bilateral popliteal ectasia to returns to the office to review CV/AOIL/LEAD  -AOIL 3/17/22 showed abdominal aorta 3 3 cm, chronic dissection renal aorta to bifurcation, celiac artery measures 1 1 cm, right ULISES 1 19 and left ULISES 1 26   -Blood pressure adequately controlled   -Continue aspirin and statin therapy for best medical management   -Repeat duplex in 1 year   -Follow up in the office in 1 year

## 2022-05-13 NOTE — ASSESSMENT & PLAN NOTE
-CV duplex 3/21/22 showed bilateral ICA less 50% stenosis, left common carotid to subclavian artery bypass widely patent   -Continue aspirin and Lipitor   -Repeat duplex in 1 year  -Follow up in the office in 1 year

## 2022-05-18 ENCOUNTER — APPOINTMENT (OUTPATIENT)
Dept: LAB | Facility: CLINIC | Age: 77
End: 2022-05-18
Payer: MEDICARE

## 2022-05-18 DIAGNOSIS — I65.02 VERTEBRAL ARTERY STENOSIS/OCCLUSION, LEFT: ICD-10-CM

## 2022-05-18 DIAGNOSIS — R51.9 INTRACTABLE HEADACHE, UNSPECIFIED CHRONICITY PATTERN, UNSPECIFIED HEADACHE TYPE: ICD-10-CM

## 2022-05-18 DIAGNOSIS — R51.9 NEW ONSET OF HEADACHES AFTER AGE 50: ICD-10-CM

## 2022-05-18 DIAGNOSIS — R93.89 ABNORMAL MAGNETIC RESONANCE ANGIOGRAPHY (MRA) OF NECK: ICD-10-CM

## 2022-05-18 LAB
BUN SERPL-MCNC: 19 MG/DL (ref 5–25)
CREAT SERPL-MCNC: 1.16 MG/DL (ref 0.6–1.3)
ERYTHROCYTE [SEDIMENTATION RATE] IN BLOOD: 14 MM/HOUR (ref 0–19)
GFR SERPL CREATININE-BSD FRML MDRD: 59 ML/MIN/1.73SQ M

## 2022-05-18 PROCEDURE — 36415 COLL VENOUS BLD VENIPUNCTURE: CPT

## 2022-05-18 PROCEDURE — 82565 ASSAY OF CREATININE: CPT

## 2022-05-18 PROCEDURE — 85652 RBC SED RATE AUTOMATED: CPT

## 2022-05-18 PROCEDURE — 84520 ASSAY OF UREA NITROGEN: CPT

## 2022-06-20 ENCOUNTER — OFFICE VISIT (OUTPATIENT)
Dept: PSYCHIATRY | Facility: CLINIC | Age: 77
End: 2022-06-20
Payer: MEDICARE

## 2022-06-20 DIAGNOSIS — F33.1 MODERATE EPISODE OF RECURRENT MAJOR DEPRESSIVE DISORDER (HCC): Primary | ICD-10-CM

## 2022-06-20 DIAGNOSIS — F41.1 GAD (GENERALIZED ANXIETY DISORDER): ICD-10-CM

## 2022-06-20 PROCEDURE — 99214 OFFICE O/P EST MOD 30 MIN: CPT | Performed by: NURSE PRACTITIONER

## 2022-06-20 NOTE — PSYCH
TREATMENT PLAN (Medication Management Only)  Treatment Plan done but not signed at time of office visit due to:  Plan reviewed by phone or in person  and verbal consent given due to [address]    Name and Date of Birth:  Rick Alcocer  65 y o  1945  Date of Treatment Plan: June 20, 2022  Diagnosis/Diagnoses:    1  Moderate episode of recurrent major depressive disorder ([facility] [address] )    2  DAVIE (generalized anxiety disorder)      Strengths/Personal Resources for Self-Care: supportive family, supportive friends  Area/Areas of need (in own words): "I am doing well "  1  Long Term Goal: "To continue to feel good and have fun in life "  Target Date: 6 months - 12/20/2022  Person/Persons responsible for completion of goal: Aaron Moreno  Short Term Objective (s) - How will we reach this goal?:   A  Provider new recommended medication/dosage changes and/or continue medication(s): continue current medications as prescribed  B   N/A  Target Date: 6 months - 12/20/2022  Person/Persons Responsible for Completion of Goal: [place]  Progress Towards Goals: stable, continuing treatment  Treatment Modality: medication education at every visit  Review due 6 months from date of this plan: 6 months - 12/20/2022  Expected length of service: ongoing treatment unless revised  My Physician/PA/NP and I have developed this plan together and I agree to work on the goals and objectives  I understand the treatment goals that were developed for my treatment

## 2022-06-20 NOTE — PSYCH
PROGRESS NOTE        [facility]      Name and Date of Birth:  Jennifer Quan  65 y o  1945    Date of Visit: 06/20/22    SUBJECTIVE:  Patient is a 48-year-old male who suffers from major depressive disorder  On examination today, he is doing well on Cymbalta and alprazolam   He is reporting no breakthrough symptoms of anxiety or depression  Keeping busy, most recently he attended a wedding in [place] and has one upcoming in [place]  He and his wife are getting along very well and are managing any new stressors that may come up  No new clinical issues or concerns voiced by patient  His sleep is good as well as his appetite  He will continue on his current medication regimen and follow-up with me will be in 6 months or sooner if necessary  He denies suicidal ideation, intent or plan at present, has no suicidal ideation, intent or plan at present  He denies any auditory hallucinations and visual hallucinations, denies any other delusional thinking, denies any delusional thinking  He denies any side effects from medications    HPI ROS Appetite Changes and Sleep: normal appetite, normal sleep    Review Of Systems:      Constitutional Negative   ENT Negative   Cardiovascular Negative   Respiratory As Noted in HPI   Gastrointestinal Negative   Genitourinary Negative   Musculoskeletal Negative   Integumentary Negative   Neurological Negative   Endocrine Negative   Other Symptoms Negative and None       Laboratory Results: No results found for this or any previous visit      Substance Abuse History:    Social History     Substance and Sexual Activity   Drug Use No       Family Psychiatric History:     Family History   Problem Relation Age of Onset    Stroke Mother     Prostate cancer Father        The following portions of the patient's history were reviewed and updated as appropriate: past family history, past medical history, past social history, past surgical history and problem list     Social History     Socioeconomic History    Marital status: /Civil Union     Spouse name: Not on file    Number of children: Not on file    Years of education: Not on file    Highest education level: Not on file   Occupational History    Not on file   Tobacco Use    Smoking status: Former Smoker     Packs/day: 1 00     Years: 24 00     Pack years: 24 00     Types: Cigarettes     Start date: 1959     Quit date: 1983     Years since quittin 1    Smokeless tobacco: Never Used   Vaping Use    Vaping Use: Never used   Substance and Sexual Activity    Alcohol use: No    Drug use: No    Sexual activity: Never   Other Topics Concern    Not on file   Social History Narrative    Not on file     Social Determinants of Health     Financial Resource Strain: Not on file   Food Insecurity: Not on file   Transportation Needs: Not on file   Physical Activity: Not on file   Stress: Not on file   Social Connections: Not on file   Intimate Partner Violence: Not on file   Housing Stability: Not on file     Social History     Social History Narrative    Not on file        Social History     Tobacco History     Smoking Status  Former Smoker Smoking Start Date  1959 Quit date  1983 Smoking Frequency  1 pack/day for 24 years (24 pk yrs)    Smoking Tobacco Type  Cigarettes    Smokeless Tobacco Use  Never Used          Alcohol History     Alcohol Use Status  No          Drug Use     Drug Use Status  No          Sexual Activity     Sexually Active  Never          Activities of Daily Living    Not Asked                     OBJECTIVE:     Mental Status Evaluation:    Appearance age appropriate, casually dressed   Behavior pleasant, cooperative   Speech normal volume, normal pitch   Mood Stable mood   Affect Bright affect   Thought Processes logical   Associations intact associations   Thought Content Normal   Perceptual Disturbances: None   Abnormal Thoughts  Risk Potential Suicidal ideation - None  Homicidal ideation - None  Potential for aggression - No   Orientation oriented to person, place, time/date and situation   Memory recent and remote memory grossly intact   Cosciousness alert and awake   Attention Span attention span and concentration are age appropriate   Intellect Appears to be of Average Intelligence   Insight Good   Judgement Good   Muscle Strength and  Gait muscle strength and tone were normal   Language no difficulty naming common objects   Fund of Knowledge displays adequate knowledge of current events   Pain None   Pain Scale 0       Assessment/Plan:       Diagnoses and all orders for this visit:    Moderate episode of recurrent major depressive disorder (HCC)    DAVIE (generalized anxiety disorder)          Treatment Recommendations/Precautions:    Continue current medications:  Xanax 0 25 mg HS p r n  Cymbalta 60 mg daily   Follow-up with me in 6 months or sooner if necessary  Risks/Benefits      Risks, Benefits And Possible Side Effects Of Medications:    Risks, benefits, and possible side effects of medications explained to patient and patient verbalizes understanding and agreement for treatment  Risk assessment:  Based on today's interview, the patient is not a risk for self-harm or harm to others  Controlled Medication Discussion:  Patient fells all scripts on time  No history of any overuse or abuse of controlled substances          Psychotherapy Provided:     Individual psychotherapy provided: No

## 2022-08-06 DIAGNOSIS — R51.9 NEW ONSET OF HEADACHES AFTER AGE 50: ICD-10-CM

## 2022-08-08 RX ORDER — VERAPAMIL HYDROCHLORIDE 40 MG/1
TABLET ORAL
Qty: 60 TABLET | Refills: 4 | Status: SHIPPED | OUTPATIENT
Start: 2022-08-08

## 2022-09-15 ENCOUNTER — OFFICE VISIT (OUTPATIENT)
Dept: NEUROLOGY | Facility: CLINIC | Age: 77
End: 2022-09-15

## 2022-09-15 VITALS
HEIGHT: 68 IN | DIASTOLIC BLOOD PRESSURE: 57 MMHG | BODY MASS INDEX: 30.66 KG/M2 | TEMPERATURE: 97.2 F | WEIGHT: 202.3 LBS | SYSTOLIC BLOOD PRESSURE: 95 MMHG | HEART RATE: 58 BPM

## 2022-09-15 DIAGNOSIS — R51.9 NEW ONSET OF HEADACHES AFTER AGE 50: ICD-10-CM

## 2022-09-15 DIAGNOSIS — G43.009 MIGRAINE WITHOUT AURA AND WITHOUT STATUS MIGRAINOSUS, NOT INTRACTABLE: Primary | ICD-10-CM

## 2022-09-15 NOTE — PROGRESS NOTES
Patient ID: Jody Bryant  is a 65 y o  male  Assessment/Plan:       Diagnoses and all orders for this visit:    Migraine without aura and without status migrainosus, not intractable  -     fremanezumab-vfrm (Ajovy) 225 MG/1 5ML auto-injector; 1 subcu injection every month    New onset of headaches after age 47         The pt should hold verapamil since no relief with >9 months of use  He currently takes motrin + tylenol dose PRN headache, which is almost daily  ?medication overuse headache  Counseled to try to limit otc analgesics to 3 per week  Rx Ajovy to see if this would reduce frequency of headaches and assist with limiting analgesics  Trying to limit PO medications however pt may benefit from Depakote in the future  Will consider  The patient should not hesitate to call me prior to his follow up with any questions or concerns  Subjective:    HPI     Mr Jody Bryant  is a very pleasant 66 yo male here for neurological follow up  Since you last visit are your headaches Remained the Same  Are you taking your current medications as prescribed? yes  Do you have any side effects? Yes but unsure which medication causes the constipation    How often do you use abortive medications to treat a headache? 1 times per day  How effective are they? effective    From 0-10, how severe is the pain for a typical headache for you? 10    What does your typical headache pain feel like? burning and pressure    Where is your typical headache? left-sided unilateral and anterior neck    What is your current headache frequency: 1 times per day    How long does your typical headache last? 15 minutes or so after taking medication      In addition to the head pain, what other symptoms do you have before or during your headaches? Dark spot in vision in Left eye    Do you have anything you need to discuss with the doctor during your visit?  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 with decreased /minimal flow proximally and distally  Vascular consult recommended  Partially visualized aortic arch stent partially thrombosed excluded descending thoracic aorta  This is not significantly changed from the prior CTA March 14, 2019 "    Brain MRI 12/10/21: mild, chornic microangiopathy, otherwise non acute  Normal MRV  Tried verapamil since last seen in January  He did not think this made a difference in his headaches  He also notes that it feels like his left ear is clogged at times  He tried PT in the spring for neck pain and helped a little bit, but did not help the headaches  If he coughs or sneezes he will get a really quick, sharp pain in the head, or if bending down to pick something up  Last February he states he fell backwards on ice/ slipped and hit the back of the head on the side walk  Thankfully did not worsen headaches, no focal deficits or change in sxs since then  He describes left eye pain at times, and pain in the temple region and ear  Can reach 8-9-10/10 when getting the sharp pain  After the shooting pain, a lower grade HA hangs around and he takes tylenol and 2 motrin tabs at that tmie and this typically helps  He gets this HA once per day typically  On questioning, he does have sound sensitivity, ie if his wife drops something on the floor, he jumps  He does not endorse sound sens a/w the HAs, no light sens, no n/v     He developed a floater in the L eye, which is there all the time  Denies scint scotomas  Sees Dr Prosper Lopez in [facility], then referred to Jenna Ventura in the [facility]  No papilledema on those exams  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  Prior notes:  New onset of headaches after age 47 - Primary  New onset of pain when he previously was seen in 2018   He was previously evaluated in 2018 for headaches felt to be related to cervical spasms  He underwent PT and improved  Over the past 5-6 months developed new onset of headaches described as "shocks" that are brief but comes on with coughing, sneezing, bending over or sudden movements  Most recent CT scan on 10/20/21 without acute abnormalities  Will order MRI brain w/wo to rule out strokes, masses, etc   MRA head and neck due to history of carotid stenosis, evaluate for aneurysms  MRV to rule out DVST  Recommend opthalmology evaluation  Unable to visualize fundus today  The following portions of the patient's history were reviewed and updated as appropriate:   He  has a past medical history of Anxiety, Aortic dissection ([facility] [address] ), [facility] ([facility] ), Colon polyp, COPD (chronic obstructive pulmonary disease) ([facility] ), CPAP (continuous positive airway pressure) dependence, Depression, Hyperlipidemia, Hypertension, Kidney stone, Rectal bleeding, Renal cyst, Renal cyst, Sleep apnea, and Sleep apnea, obstructive    He   Patient Active Problem List    Diagnosis Date Noted   • Migraine without aura and without status migrainosus, not intractable 10/21/2022   • New onset of headaches after age 47 11/08/2021   • Dizziness and giddiness  11/08/2021   • Other specified disorders of arteries and arterioles ([facility] )  11/08/2021   • Internal hemorrhoids 10/28/2021   • Diverticulosis 10/28/2021   • Brain injury    • COPD (chronic obstructive pulmonary disease) ([facility] )    • Anxiety    • Gastroesophageal reflux 03/25/2021   • Change in bowel habits 03/25/2021   • Overweight 03/25/2021   • Hx of adenomatous colonic polyps 03/24/2021   • Bilateral carotid artery stenosis 03/18/2021   • PAD (peripheral artery disease) ([facility] ) 03/18/2021   • Thoracic aortic aneurysm without rupture 11/26/2019   • Aneurysm of popliteal artery ([facility] ) 06/26/2019   • Hyperlipidemia 06/26/2019   • Obstructive sleep apnea (adult) (pediatric) 08/07/2018   • Carotid atherosclerosis 08/26/2016   • Recurrent major depression in full remission ([facility] ) 03/30/2016   • Renal cyst, acquired 04/17/2014   • Aneurysm of abdominal aorta 02/06/2014   • Hypertension 03/28/2013     He  has a past surgical history that includes Celiac artery stent; Kidney stone surgery; Colonoscopy; and Abdominal aortic aneurysm repair  His family history includes Prostate cancer in his father; Stroke in his mother  He  reports that he quit smoking about 39 years ago  His smoking use included cigarettes  He started smoking about 63 years ago  He has a 24 00 pack-year smoking history  He has never used smokeless tobacco  He reports that he does not drink alcohol and does not use drugs  Current Outpatient Medications   Medication Sig Dispense Refill   • acetaminophen (TYLENOL) 325 mg tablet Take 3 tablets (975 mg total) by mouth every 8 (eight) hours as needed for mild pain  0   • aspirin 81 MG tablet Take 1 tablet by mouth daily       • atorvastatin (LIPITOR) 40 mg tablet Take 40 mg by mouth daily  0   • Cholecalciferol (Vitamin D3) 25 MCG (1000 UT) CAPS Take 1,000 Units by mouth daily     • DULoxetine (CYMBALTA) 60 mg delayed release capsule Take 1 capsule (60 mg total) by mouth daily 90 capsule 3   • fremanezumab-vfrm (Ajovy) 225 MG/1 5ML auto-injector 1 subcu injection every month 1 mL 11   • labetalol (NORMODYNE) 200 mg tablet Take 100 mg by mouth 2 (two) times a day     • verapamil (CALAN) 40 mg tablet take 1 tablet by mouth twice a day 60 tablet 4   • ALPRAZolam (XANAX) 0 25 mg tablet take 1 tablet by mouth at bedtime if needed for anxiety 90 tablet 1   • amoxicillin-clavulanate (AUGMENTIN) 875-125 mg per tablet Take 1 tablet by mouth 2 (two) times a day       No current facility-administered medications for this visit  He is allergic to lorazepam          Objective:    Blood pressure 95/57, pulse 58, temperature (!) 97 2 °F (36 2 °C), temperature source Temporal, height 5' 8" (1 727 m), weight 91 8 kg (202 lb 4 8 oz)    Body mass index is 30 76 kg/m²  Physical Exam    Neurological Exam  On neurologic exam, the patient is alert and oriented to time and place  Speech is fluent and articulate, and the patient follows commands appropriately  Judgment and affect appear normal  Pupils are equally round and reactive to light and extraocular muscles are intact without nystagmus  Face is symmetric, and tongue, uvula, and palate are midline  Hearing is intact  Motor examination reveals intact strength throughout  Normal gait is steady  ROS:    Review of Systems   Constitutional: Negative  Negative for appetite change and fever  HENT: Positive for tinnitus (left)  Negative for hearing loss, trouble swallowing and voice change  Eyes: Negative  Negative for photophobia and pain  Respiratory: Negative  Negative for shortness of breath  Cardiovascular: Negative  Negative for palpitations  Gastrointestinal: Negative  Negative for nausea and vomiting  Endocrine: Negative  Negative for cold intolerance  Genitourinary: Negative  Negative for dysuria, frequency and urgency  Musculoskeletal: Negative  Negative for myalgias and neck pain  Skin: Negative  Negative for rash  Neurological: Positive for headaches  Negative for dizziness, tremors, seizures, syncope, facial asymmetry, speech difficulty, weakness, light-headedness and numbness  Hematological: Negative  Does not bruise/bleed easily  Psychiatric/Behavioral: Negative  Negative for confusion, hallucinations and sleep disturbance  The following portions of the patient's history were reviewed and updated as appropriate: allergies, current medications/ medication history, past family history, past medical history, past social history, past surgical history and problem list     Review of systems was reviewed and otherwise unremarkable from a neurological perspective

## 2022-10-21 PROBLEM — G43.009 MIGRAINE WITHOUT AURA AND WITHOUT STATUS MIGRAINOSUS, NOT INTRACTABLE: Status: ACTIVE | Noted: 2022-10-21

## 2022-11-01 ENCOUNTER — TELEPHONE (OUTPATIENT)
Dept: NEUROLOGY | Facility: CLINIC | Age: 77
End: 2022-11-01

## 2022-11-01 DIAGNOSIS — G43.009 MIGRAINE WITHOUT AURA AND WITHOUT STATUS MIGRAINOSUS, NOT INTRACTABLE: Primary | ICD-10-CM

## 2022-11-01 NOTE — TELEPHONE ENCOUNTER
----- Message from Ashley Gilman PA-C sent at 10/21/2022  2:26 PM EDT -----  Regarding: Stephania downs  It looks like ajovy was denied  Is it possible to start PA? The pt can also try aimovig or emgality  Thanks

## 2022-11-04 NOTE — TELEPHONE ENCOUNTER
I called pharmacy for insurance information:    [phone]  prescriptions soloutions  medicare  ID [id number]  [facility] psr  bin [id number]  [facility] [id number]

## 2022-11-04 NOTE — TELEPHONE ENCOUNTER
I was unable to submit PA via CMM  Verbally called to insurance company  Was told emgality and Elex Gather are on formulary however submitted anyway for clinic review  Response 24-72 hours      [phone]  prescriptions soloutions  medicare  ID [id number]  grp psr  bin [id number]  pcn [id number]    Determination pending

## 2022-11-08 RX ORDER — GALCANEZUMAB 120 MG/ML
INJECTION, SOLUTION SUBCUTANEOUS
Qty: 2 ML | Refills: 0 | Status: SHIPPED | OUTPATIENT
Start: 2022-11-08

## 2022-11-08 RX ORDER — GALCANEZUMAB 120 MG/ML
INJECTION, SOLUTION SUBCUTANEOUS
Qty: 1 ML | Refills: 11 | Status: SHIPPED | OUTPATIENT
Start: 2022-11-08

## 2022-11-08 NOTE — TELEPHONE ENCOUNTER
ajovy denied; must try and or have intolerance to both aimovig and emgality    office note 9/15 documents constipation; do you want him to try emgality?

## 2022-11-16 ENCOUNTER — APPOINTMENT (OUTPATIENT)
Dept: LAB | Facility: CLINIC | Age: 77
End: 2022-11-16

## 2022-11-16 DIAGNOSIS — E55.9 VITAMIN D DEFICIENCY: ICD-10-CM

## 2022-11-16 DIAGNOSIS — Z13.0 SCREENING FOR IRON DEFICIENCY ANEMIA: ICD-10-CM

## 2022-11-16 DIAGNOSIS — R51.9 NONINTRACTABLE HEADACHE, UNSPECIFIED CHRONICITY PATTERN, UNSPECIFIED HEADACHE TYPE: ICD-10-CM

## 2022-11-16 DIAGNOSIS — E78.5 HYPERLIPIDEMIA, UNSPECIFIED HYPERLIPIDEMIA TYPE: ICD-10-CM

## 2022-11-16 LAB
25(OH)D3 SERPL-MCNC: 84.2 NG/ML (ref 30–100)
ALBUMIN SERPL BCP-MCNC: 3.9 G/DL (ref 3.5–5)
ALP SERPL-CCNC: 86 U/L (ref 34–104)
ALT SERPL W P-5'-P-CCNC: 11 U/L (ref 7–52)
ANION GAP SERPL CALCULATED.3IONS-SCNC: 5 MMOL/L (ref 4–13)
AST SERPL W P-5'-P-CCNC: 16 U/L (ref 13–39)
BASOPHILS # BLD AUTO: 0.07 THOUSANDS/ÂΜL (ref 0–0.1)
BASOPHILS NFR BLD AUTO: 1 % (ref 0–1)
BILIRUB SERPL-MCNC: 0.99 MG/DL (ref 0.2–1)
BUN SERPL-MCNC: 19 MG/DL (ref 5–25)
CALCIUM SERPL-MCNC: 9.4 MG/DL (ref 8.4–10.2)
CHLORIDE SERPL-SCNC: 105 MMOL/L (ref 96–108)
CHOLEST SERPL-MCNC: 135 MG/DL
CO2 SERPL-SCNC: 30 MMOL/L (ref 21–32)
CREAT SERPL-MCNC: 1.25 MG/DL (ref 0.6–1.3)
EOSINOPHIL # BLD AUTO: 0.15 THOUSAND/ÂΜL (ref 0–0.61)
EOSINOPHIL NFR BLD AUTO: 2 % (ref 0–6)
ERYTHROCYTE [DISTWIDTH] IN BLOOD BY AUTOMATED COUNT: 12.7 % (ref 11.6–15.1)
ERYTHROCYTE [SEDIMENTATION RATE] IN BLOOD: 15 MM/HOUR (ref 0–19)
GFR SERPL CREATININE-BSD FRML MDRD: 54 ML/MIN/1.73SQ M
GLUCOSE P FAST SERPL-MCNC: 93 MG/DL (ref 65–99)
HCT VFR BLD AUTO: 42.3 % (ref 36.5–49.3)
HDLC SERPL-MCNC: 46 MG/DL
HGB BLD-MCNC: 14.4 G/DL (ref 12–17)
IMM GRANULOCYTES # BLD AUTO: 0.02 THOUSAND/UL (ref 0–0.2)
IMM GRANULOCYTES NFR BLD AUTO: 0 % (ref 0–2)
LDLC SERPL CALC-MCNC: 61 MG/DL (ref 0–100)
LYMPHOCYTES # BLD AUTO: 1.46 THOUSANDS/ÂΜL (ref 0.6–4.47)
LYMPHOCYTES NFR BLD AUTO: 19 % (ref 14–44)
MCH RBC QN AUTO: 33 PG (ref 26.8–34.3)
MCHC RBC AUTO-ENTMCNC: 34 G/DL (ref 31.4–37.4)
MCV RBC AUTO: 97 FL (ref 82–98)
MONOCYTES # BLD AUTO: 0.59 THOUSAND/ÂΜL (ref 0.17–1.22)
MONOCYTES NFR BLD AUTO: 8 % (ref 4–12)
NEUTROPHILS # BLD AUTO: 5.34 THOUSANDS/ÂΜL (ref 1.85–7.62)
NEUTS SEG NFR BLD AUTO: 70 % (ref 43–75)
NONHDLC SERPL-MCNC: 89 MG/DL
NRBC BLD AUTO-RTO: 0 /100 WBCS
PLATELET # BLD AUTO: 239 THOUSANDS/UL (ref 149–390)
PMV BLD AUTO: 8.7 FL (ref 8.9–12.7)
POTASSIUM SERPL-SCNC: 4.3 MMOL/L (ref 3.5–5.3)
PROT SERPL-MCNC: 6.9 G/DL (ref 6.4–8.4)
RBC # BLD AUTO: 4.37 MILLION/UL (ref 3.88–5.62)
SODIUM SERPL-SCNC: 140 MMOL/L (ref 135–147)
TRIGL SERPL-MCNC: 138 MG/DL
WBC # BLD AUTO: 7.63 THOUSAND/UL (ref 4.31–10.16)

## 2022-12-04 ENCOUNTER — APPOINTMENT (EMERGENCY)
Dept: CT IMAGING | Facility: HOSPITAL | Age: 77
End: 2022-12-04

## 2022-12-04 ENCOUNTER — HOSPITAL ENCOUNTER (OUTPATIENT)
Facility: HOSPITAL | Age: 77
Setting detail: OBSERVATION
Discharge: HOME | End: 2022-12-05
Attending: EMERGENCY MEDICINE | Admitting: INTERNAL MEDICINE

## 2022-12-04 ENCOUNTER — APPOINTMENT (EMERGENCY)
Dept: RADIOLOGY | Facility: HOSPITAL | Age: 77
End: 2022-12-04

## 2022-12-04 DIAGNOSIS — R55 POSTURAL DIZZINESS WITH PRESYNCOPE: ICD-10-CM

## 2022-12-04 DIAGNOSIS — R00.1 SYMPTOMATIC BRADYCARDIA: Primary | ICD-10-CM

## 2022-12-04 DIAGNOSIS — R42 POSTURAL DIZZINESS WITH PRESYNCOPE: ICD-10-CM

## 2022-12-04 DIAGNOSIS — I10 PRIMARY HYPERTENSION: ICD-10-CM

## 2022-12-04 LAB
2HR DELTA HS TROPONIN: 0 NG/L
4HR DELTA HS TROPONIN: 1 NG/L
ALBUMIN SERPL BCP-MCNC: 3.8 G/DL (ref 3.5–5)
ALP SERPL-CCNC: 75 U/L (ref 34–104)
ALT SERPL W P-5'-P-CCNC: 11 U/L (ref 7–52)
ANION GAP SERPL CALCULATED.3IONS-SCNC: 5 MMOL/L (ref 4–13)
AST SERPL W P-5'-P-CCNC: 17 U/L (ref 13–39)
ATRIAL RATE: 43 BPM
ATRIAL RATE: 47 BPM
ATRIAL RATE: 61 BPM
BASOPHILS # BLD AUTO: 0.06 THOUSANDS/ÂΜL (ref 0–0.1)
BASOPHILS NFR BLD AUTO: 1 % (ref 0–1)
BILIRUB SERPL-MCNC: 0.94 MG/DL (ref 0.2–1)
BUN SERPL-MCNC: 24 MG/DL (ref 5–25)
CALCIUM SERPL-MCNC: 9.1 MG/DL (ref 8.4–10.2)
CARDIAC TROPONIN I PNL SERPL HS: 6 NG/L
CARDIAC TROPONIN I PNL SERPL HS: 6 NG/L
CARDIAC TROPONIN I PNL SERPL HS: 7 NG/L
CHLORIDE SERPL-SCNC: 106 MMOL/L (ref 96–108)
CO2 SERPL-SCNC: 28 MMOL/L (ref 21–32)
CREAT SERPL-MCNC: 1.29 MG/DL (ref 0.6–1.3)
EOSINOPHIL # BLD AUTO: 0.14 THOUSAND/ÂΜL (ref 0–0.61)
EOSINOPHIL NFR BLD AUTO: 2 % (ref 0–6)
ERYTHROCYTE [DISTWIDTH] IN BLOOD BY AUTOMATED COUNT: 12.7 % (ref 11.6–15.1)
GFR SERPL CREATININE-BSD FRML MDRD: 52 ML/MIN/1.73SQ M
GLUCOSE SERPL-MCNC: 110 MG/DL (ref 65–140)
GLUCOSE SERPL-MCNC: 112 MG/DL (ref 65–140)
HCT VFR BLD AUTO: 39.3 % (ref 36.5–49.3)
HGB BLD-MCNC: 13.3 G/DL (ref 12–17)
IMM GRANULOCYTES # BLD AUTO: 0.02 THOUSAND/UL (ref 0–0.2)
IMM GRANULOCYTES NFR BLD AUTO: 0 % (ref 0–2)
LACTATE SERPL-SCNC: 0.9 MMOL/L (ref 0.5–2)
LYMPHOCYTES # BLD AUTO: 1.56 THOUSANDS/ÂΜL (ref 0.6–4.47)
LYMPHOCYTES NFR BLD AUTO: 25 % (ref 14–44)
MCH RBC QN AUTO: 32.3 PG (ref 26.8–34.3)
MCHC RBC AUTO-ENTMCNC: 33.8 G/DL (ref 31.4–37.4)
MCV RBC AUTO: 95 FL (ref 82–98)
MONOCYTES # BLD AUTO: 0.54 THOUSAND/ÂΜL (ref 0.17–1.22)
MONOCYTES NFR BLD AUTO: 9 % (ref 4–12)
NEUTROPHILS # BLD AUTO: 3.83 THOUSANDS/ÂΜL (ref 1.85–7.62)
NEUTS SEG NFR BLD AUTO: 63 % (ref 43–75)
NRBC BLD AUTO-RTO: 0 /100 WBCS
P AXIS: 16 DEGREES
P AXIS: 24 DEGREES
P AXIS: 45 DEGREES
PLATELET # BLD AUTO: 180 THOUSANDS/UL (ref 149–390)
PMV BLD AUTO: 8.6 FL (ref 8.9–12.7)
POTASSIUM SERPL-SCNC: 4.2 MMOL/L (ref 3.5–5.3)
PR INTERVAL: 226 MS
PR INTERVAL: 256 MS
PR INTERVAL: 266 MS
PROT SERPL-MCNC: 6.5 G/DL (ref 6.4–8.4)
QRS AXIS: 2 DEGREES
QRS AXIS: 5 DEGREES
QRS AXIS: 9 DEGREES
QRSD INTERVAL: 132 MS
QRSD INTERVAL: 86 MS
QRSD INTERVAL: 92 MS
QT INTERVAL: 452 MS
QT INTERVAL: 494 MS
QT INTERVAL: 508 MS
QTC INTERVAL: 400 MS
QTC INTERVAL: 429 MS
QTC INTERVAL: 497 MS
RBC # BLD AUTO: 4.12 MILLION/UL (ref 3.88–5.62)
SODIUM SERPL-SCNC: 139 MMOL/L (ref 135–147)
T WAVE AXIS: -7 DEGREES
T WAVE AXIS: 71 DEGREES
T WAVE AXIS: 85 DEGREES
TSH SERPL DL<=0.05 MIU/L-ACNC: 3.84 UIU/ML (ref 0.45–4.5)
VENTRICULAR RATE: 43 BPM
VENTRICULAR RATE: 47 BPM
VENTRICULAR RATE: 61 BPM
WBC # BLD AUTO: 6.15 THOUSAND/UL (ref 4.31–10.16)

## 2022-12-04 RX ORDER — ENOXAPARIN SODIUM 100 MG/ML
40 INJECTION SUBCUTANEOUS DAILY
Status: DISCONTINUED | OUTPATIENT
Start: 2022-12-05 | End: 2022-12-05 | Stop reason: HOSPADM

## 2022-12-04 RX ORDER — ATROPINE SULFATE 1 MG/ML
0.5 INJECTION, SOLUTION INTRAVENOUS ONCE
Status: DISCONTINUED | OUTPATIENT
Start: 2022-12-04 | End: 2022-12-04

## 2022-12-04 RX ORDER — ALPRAZOLAM 0.25 MG/1
0.25 TABLET ORAL
Status: DISCONTINUED | OUTPATIENT
Start: 2022-12-04 | End: 2022-12-05 | Stop reason: HOSPADM

## 2022-12-04 RX ORDER — AMOXICILLIN 250 MG
1 CAPSULE ORAL ONCE
Status: DISCONTINUED | OUTPATIENT
Start: 2022-12-04 | End: 2022-12-04

## 2022-12-04 RX ORDER — ACETAMINOPHEN 325 MG/1
975 TABLET ORAL EVERY 8 HOURS PRN
Status: DISCONTINUED | OUTPATIENT
Start: 2022-12-04 | End: 2022-12-05 | Stop reason: HOSPADM

## 2022-12-04 RX ORDER — ATROPINE SULFATE 0.1 MG/ML
0.5 INJECTION INTRAVENOUS ONCE
Status: COMPLETED | OUTPATIENT
Start: 2022-12-04 | End: 2022-12-04

## 2022-12-04 RX ORDER — ATORVASTATIN CALCIUM 40 MG/1
40 TABLET, FILM COATED ORAL DAILY
Status: DISCONTINUED | OUTPATIENT
Start: 2022-12-04 | End: 2022-12-05 | Stop reason: HOSPADM

## 2022-12-04 RX ORDER — DULOXETIN HYDROCHLORIDE 60 MG/1
60 CAPSULE, DELAYED RELEASE ORAL DAILY
Status: DISCONTINUED | OUTPATIENT
Start: 2022-12-05 | End: 2022-12-05 | Stop reason: HOSPADM

## 2022-12-04 RX ORDER — SENNOSIDES 8.6 MG
1 TABLET ORAL
Status: DISCONTINUED | OUTPATIENT
Start: 2022-12-04 | End: 2022-12-05 | Stop reason: HOSPADM

## 2022-12-04 RX ORDER — MELATONIN
1000 DAILY
Status: DISCONTINUED | OUTPATIENT
Start: 2022-12-05 | End: 2022-12-05 | Stop reason: HOSPADM

## 2022-12-04 RX ORDER — SENNOSIDES 8.6 MG
1 TABLET ORAL
Status: DISCONTINUED | OUTPATIENT
Start: 2022-12-04 | End: 2022-12-04

## 2022-12-04 RX ORDER — HYDRALAZINE HYDROCHLORIDE 20 MG/ML
10 INJECTION INTRAMUSCULAR; INTRAVENOUS EVERY 8 HOURS PRN
Status: DISCONTINUED | OUTPATIENT
Start: 2022-12-04 | End: 2022-12-05 | Stop reason: HOSPADM

## 2022-12-04 RX ORDER — ASPIRIN 81 MG/1
81 TABLET ORAL DAILY
Status: DISCONTINUED | OUTPATIENT
Start: 2022-12-05 | End: 2022-12-05 | Stop reason: HOSPADM

## 2022-12-04 RX ADMIN — ATORVASTATIN CALCIUM 40 MG: 40 TABLET, FILM COATED ORAL at 18:54

## 2022-12-04 RX ADMIN — SODIUM CHLORIDE 1000 ML: 0.9 INJECTION, SOLUTION INTRAVENOUS at 12:29

## 2022-12-04 RX ADMIN — ATROPINE SULFATE 0.5 MG: 0.1 INJECTION INTRAVENOUS at 12:38

## 2022-12-04 RX ADMIN — SENNOSIDES 8.6 MG: 8.6 TABLET, FILM COATED ORAL at 18:54

## 2022-12-04 RX ADMIN — ALPRAZOLAM 0.25 MG: 0.25 TABLET ORAL at 21:20

## 2022-12-04 RX ADMIN — IOHEXOL 100 ML: 350 INJECTION, SOLUTION INTRAVENOUS at 13:01

## 2022-12-04 NOTE — ED ATTENDING ATTESTATION
12/4/2022  Dennise HAND, DO, saw and evaluated the patient  I have discussed the patient with the resident/non-physician practitioner and agree with the resident's/non-physician practitioner's findings, Plan of Care, and MDM as documented in the resident's/non-physician practitioner's note, except where noted  All available labs and Radiology studies were reviewed  I was present for key portions of any procedure(s) performed by the resident/non-physician practitioner and I was immediately available to provide assistance  At this point I agree with the current assessment done in the Emergency Department  I have conducted an independent evaluation of this patient a history and physical is as follows:    34-year-old male coming into the ED for evaluation of dizziness, intermittent abdominal pain, with associated sweating  Arrived to the ED was found to be bradycardic in the 40s  He does take Cardizem daily  Denies any chest pain or shortness of breath  On physical exam: pt very well appearing, in NAD  mucous membranes moist   CTA b/l , heart bradycardic with regular rhythm  Abdomen NT, ND, no R/R/G  Neuro intact, gcs 15  Cap refill < 2 sec, skin warm and dry  ED Course     Admit for symptomatic bradycardia, on cardizem, responsive to atropine IV  CTA dissection study negative for acute changes to prior aortic dissection      Critical Care Time  Procedures

## 2022-12-04 NOTE — PROGRESS NOTES
Vascular Surgery Note    Notified by ER SARAH Farias  Chart and imaging reviewed  Patient not seen or examined by me  15min spent with chart review and ER communication  Pt is a 64 yo M w/ hx of what appears to be carotid-subclavian bypass, L SC artery amplatzer occlusion, and TEVAR for type B dissection  This was performed '  Procedure presumed based on my review of his imaging and chart review    Presented after dizziness and abdominal pain while at a  today      -reviewed CTA which shows a patent carotid-subclavian bypass, proximal L subclavian occlusion, TEVAR; there is flow in the false lumen distal to the TEVAR; the celiac, SMA, and L renal are supplied by the true lumen and the R renal has dual flow from the true and false lumens; there appears to be good flow into the B iliac systems; largest aortic diameter is 5 1 at the SMA takeoff; this all appears stable when compared to last CT imaging in   -patient is followed outpatient by vascular, last seen in  by Neeraj Dai and ordered for surveillance imaging  -please place consultation if formal consultation is desired

## 2022-12-04 NOTE — ASSESSMENT & PLAN NOTE
· Presyncopal episode secondary to dizziness  · At the ED HR 43 beats per minute received 1 dose of atropine 0 5 mg heart rate stable on the 60s  · ECG noted sinus rhythm with first-degree AV block, right bundle shawn block  · Home meds chart review:  Labetalol 100 mg b i d , verapamil 40 mg b i d  · ECG:  Normal sinus rhythm with prolonged RI with first-degree AV block  · Plan  · Monitor in telemetry  · Follow-up TSH  · Continue holding home meds  · Monitor on overnight if reappearance of sentence consideration to consult Cardiology on the bin [place]

## 2022-12-04 NOTE — ASSESSMENT & PLAN NOTE
· History of essential hypertension  · Home meds:  Verapamil 40 mg b i d , labetalol 100 mg b i d  POA: Blood Pressure: 163/72  · Plan  · Holding home meds at this time  · Hydralazine 10 mg p r n   For systolic blood pressure over 180

## 2022-12-04 NOTE — ED PROVIDER NOTES
History  Chief Complaint   Patient presents with   • Dizziness     Was at a  and wife noticed that patient was stumbling; pt c/o of being dizzy     67-year-old male presenting with dizziness at a  today  Patient's wife states that he seemed to be stumbling or getting to the car  Patient states that he is still slightly dizzy here today with  Patient also complains of some upper abdominal pain and was experiencing this pain during this episode  Patient did not fall did not his head  Is on no blood thinners  Is on baby aspirin  Patient states that he took his normal medications which include only verapamil half tablet this morning  Patient states that he has been eating drinking appropriately and denies any chest pain or shortness of breath  Patient states that his head and neck her but this is been unchanged since last year when he fell and hit his head on the ice  Denies any abdominal pain over the 1 mentioned above  No back pain  Does have a history of 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 Taking? ALPRAZolam (XANAX) 0 25 mg tablet   No No   Sig: take 1 tablet by mouth at bedtime if needed for anxiety   Cholecalciferol (Vitamin D3) 25 MCG (1000 UT) CAPS   Yes No   Sig: Take 1,000 Units by mouth daily   DULoxetine (CYMBALTA) 60 mg delayed release capsule   No No   Sig: Take 1 capsule (60 mg total) by mouth daily   Galcanezumab-gnlm (Emgality) 120 MG/ML SOAJ   No No   Sig: One ml subcutaneous on the right thigh and 1 ml subcutaneous on the left thigh at the same time for 1 dose   Galcanezumab-gnlm (Emgality) 120 MG/ML SOAJ   No No   Si days after loading dose, inject 1 pen subq every 30 days     acetaminophen (TYLENOL) 325 mg tablet   No No Sig: Take 3 tablets (975 mg total) by mouth every 8 (eight) hours as needed for mild pain   amoxicillin-clavulanate (AUGMENTIN) 875-125 mg per tablet   Yes No   Sig: Take 1 tablet by mouth 2 (two) times a day   aspirin 81 MG tablet   Yes No   Sig: Take 1 tablet by mouth daily     atorvastatin (LIPITOR) 40 mg tablet   Yes No   Sig: Take 40 mg by mouth daily   labetalol (NORMODYNE) 200 mg tablet   Yes No   Sig: Take 100 mg by mouth 2 (two) times a day   verapamil (CALAN) 40 mg tablet   No No   Sig: take 1 tablet by mouth twice a day      Facility-Administered Medications: None       Past Medical History:   Diagnosis Date   • Anxiety    • Aortic dissection (HCC)    • Brain injury     Hx of fall on ice in winter 2021- treated at [facility] [place] [facility] [place] notices occasional sharp pains in head with cough    • Colon polyp    • COPD (chronic obstructive pulmonary disease) (HCC)     PCP states mild   • CPAP (continuous positive airway pressure) dependence    • Depression    • Hyperlipidemia    • Hypertension    • Kidney stone    • Rectal bleeding     hx of when he was taking Eliquis   • Renal cyst    • Renal cyst    • Sleep apnea    • Sleep apnea, obstructive        Past Surgical History:   Procedure Laterality Date   • ABDOMINAL AORTIC ANEURYSM REPAIR      about 20 years ago   • CELIAC ARTERY STENT     • COLONOSCOPY     • KIDNEY STONE SURGERY         Family History   Problem Relation Age of Onset   • Stroke Mother    • Prostate cancer Father      I have reviewed and agree with the history as documented      E-Cigarette/Vaping   • E-Cigarette Use Never User      E-Cigarette/Vaping Substances   • Nicotine No    • THC No    • CBD No    • Flavoring No    • Other No    • Unknown No      Social History     Tobacco Use   • Smoking status: Former     Packs/day: 1 00     Years: 24 00     Pack years: 24 00     Types: Cigarettes     Start date: 1959     Quit date: 1983     Years since quittin 6   • Smokeless tobacco: Never Vaping Use   • Vaping Use: Never used   Substance Use Topics   • Alcohol use: No   • Drug use: No       Review of Systems   Constitutional: Negative for chills and fever  HENT: Negative for ear pain and sore throat  Eyes: Negative for pain and visual disturbance  Respiratory: Negative for cough, shortness of breath, wheezing and stridor  Cardiovascular: Negative for chest pain, palpitations and leg swelling  Gastrointestinal: Negative for abdominal pain, diarrhea, nausea and vomiting  Genitourinary: Negative for dysuria and hematuria  Musculoskeletal: Positive for gait problem (Stumbling had  )  Negative for arthralgias and back pain  Skin: Negative for color change and rash  Neurological: Positive for dizziness and headaches (Unchanged from baseline  )  Negative for tremors, seizures, syncope, facial asymmetry, speech difficulty, weakness, light-headedness and numbness  All other systems reviewed and are negative  Physical Exam  Physical Exam  Vitals and nursing note reviewed  Constitutional:       General: He is not in acute distress  Appearance: He is well-developed  He is not ill-appearing  HENT:      Head: Normocephalic and atraumatic  Eyes:      Conjunctiva/sclera: Conjunctivae normal    Neck:      Vascular: No carotid bruit  Cardiovascular:      Rate and Rhythm: Regular rhythm  Bradycardia present  Pulses: Normal pulses  Heart sounds: Normal heart sounds  No murmur heard  No friction rub  No gallop  Pulmonary:      Effort: Pulmonary effort is normal  No respiratory distress  Breath sounds: Normal breath sounds  No stridor  No wheezing, rhonchi or rales  Chest:      Chest wall: No tenderness  Abdominal:      Palpations: Abdomen is soft  There is no mass  Tenderness: There is no abdominal tenderness  There is no right CVA tenderness or left CVA tenderness  Comments: No pulsatile mass in the abdomen     Musculoskeletal: General: No swelling, tenderness, deformity or signs of injury  Cervical back: Neck supple  No rigidity or tenderness  Right lower leg: No edema  Left lower leg: No edema  Skin:     General: Skin is warm and dry  Capillary Refill: Capillary refill takes less than 2 seconds  Coloration: Skin is not jaundiced or pale  Findings: No bruising, erythema, lesion or rash  Neurological:      Mental Status: He is alert     Psychiatric:         Mood and Affect: Mood normal          Vital Signs  ED Triage Vitals   Temperature Pulse Respirations Blood Pressure SpO2   12/04/22 1204 12/04/22 1204 12/04/22 1204 12/04/22 1204 12/04/22 1204   (!) 97 1 °F (36 2 °C) (!) 47 18 143/63 94 %      Temp Source Heart Rate Source Patient Position - Orthostatic VS BP Location FiO2 (%)   12/04/22 1204 12/04/22 1239 12/04/22 1239 12/04/22 1239 --   Axillary Monitor Sitting Left arm       Pain Score       12/04/22 1204       No Pain           Vitals:    12/04/22 1330 12/04/22 1400 12/04/22 1430 12/04/22 1500   BP: 138/53 152/69 154/67 166/72   Pulse: 64 61 61 58   Patient Position - Orthostatic VS: Sitting Lying Sitting Sitting         Visual Acuity      ED Medications  Medications   sodium chloride 0 9 % bolus 1,000 mL (0 mL Intravenous Stopped 12/4/22 1352)   atropine 1 mg/10 mL injection 0 5 mg (0 5 mg Intravenous Given 12/4/22 1238)   iohexol (OMNIPAQUE) 350 MG/ML injection (SINGLE-DOSE) 100 mL (100 mL Intravenous Given 12/4/22 1301)       Diagnostic Studies  Results Reviewed     Procedure Component Value Units Date/Time    HS Troponin I 2hr [[id number]]  (Normal) Collected: 12/04/22 1445    Lab Status: Final result Specimen: Blood from Hand, Right Updated: 12/04/22 1522     hs TnI 2hr 6 ng/L      Delta 2hr hsTnI 0 ng/L     HS Troponin I 4hr [[id number]]     Lab Status: No result Specimen: Blood from Hand, Right     Lactic acid, plasma [[id number]]  (Normal) Collected: 12/04/22 1234    Lab Status: Final result Specimen: Blood from Arm, Right Updated: 12/04/22 1259     LACTIC ACID 0 9 mmol/L     Narrative:      Result may be elevated if tourniquet was used during collection      HS Troponin 0hr (reflex protocol) [[id number]]  (Normal) Collected: 12/04/22 1215    Lab Status: Final result Specimen: Blood from Arm, Left Updated: 12/04/22 1242     hs TnI 0hr 6 ng/L     Comprehensive metabolic panel [[id number]] Collected: 12/04/22 1215    Lab Status: Final result Specimen: Blood from Arm, Left Updated: 12/04/22 1235     Sodium 139 mmol/L      Potassium 4 2 mmol/L      Chloride 106 mmol/L      CO2 28 mmol/L      ANION GAP 5 mmol/L      BUN 24 mg/dL      Creatinine 1 29 mg/dL      Glucose 110 mg/dL      Calcium 9 1 mg/dL      AST 17 U/L      ALT 11 U/L      Alkaline Phosphatase 75 U/L      Total Protein 6 5 g/dL      Albumin 3 8 g/dL      Total Bilirubin 0 94 mg/dL      eGFR 52 ml/min/1 73sq m     Narrative:      [facility] guidelines for Chronic Kidney Disease (CKD):   •  Stage 1 with normal or high GFR (GFR > 90 mL/min/1 73 square meters)  •  Stage 2 Mild CKD (GFR = 60-89 mL/min/1 73 square meters)  •  Stage 3A Moderate CKD (GFR = 45-59 mL/min/1 73 square meters)  •  Stage 3B Moderate CKD (GFR = 30-44 mL/min/1 73 square meters)  •  Stage 4 Severe CKD (GFR = 15-29 mL/min/1 73 square meters)  •  Stage 5 End Stage CKD (GFR <15 mL/min/1 73 square meters)  Note: GFR calculation is accurate only with a steady state creatinine    TSH, 3rd generation with Free T4 reflex [[id number]]     Lab Status: No result Specimen: Blood     Fingerstick Glucose (POCT) [[id number]]  (Normal) Collected: 12/04/22 1224    Lab Status: Final result Updated: 12/04/22 1225     POC Glucose 112 mg/dl     CBC and differential [[id number]]  (Abnormal) Collected: 12/04/22 1215    Lab Status: Final result Specimen: Blood from Arm, Left Updated: 12/04/22 1224     WBC 6 15 Thousand/uL      RBC 4 12 Million/uL      Hemoglobin 13 3 g/dL Hematocrit 39 3 %      MCV 95 fL      MCH 32 3 pg      MCHC 33 8 g/dL      RDW 12 7 %      MPV 8 6 fL      Platelets 435 Thousands/uL      nRBC 0 /100 WBCs      Neutrophils Relative 63 %      Immat GRANS % 0 %      Lymphocytes Relative 25 %      Monocytes Relative 9 %      Eosinophils Relative 2 %      Basophils Relative 1 %      Neutrophils Absolute 3 83 Thousands/µL      Immature Grans Absolute 0 02 Thousand/uL      Lymphocytes Absolute 1 56 Thousands/µL      Monocytes Absolute 0 54 Thousand/µL      Eosinophils Absolute 0 14 Thousand/µL      Basophils Absolute 0 06 Thousands/µL                  CTA dissection protocol chest/abdomen/pelvis   Final Result by Kristen Tanner MD (12/04 1413)      1  Stable appearance of type B aortic dissection post endovascular repair  2   No acute findings in the chest, abdomen, or pelvis  Workstation performed: [id number]         XR chest 1 view portable   Final Result by Khushbu Florian MD (12/04 1419)      No acute cardiopulmonary disease  Workstation performed: [id number]                    Procedures  Procedures         ED Course  ED Course as of 12/04/22 1532   Sun Dec 04, 2022   1448 Discussed with patient findings of CTA, unchanged aorta and no signs of dissection  Was agreeable to admission for telemetry and cardiology consultation  Patient feeling much better, HR 62  MDM  Number of Diagnoses or Management Options  Symptomatic bradycardia  Diagnosis management comments: 69-year-old male presenting with dizziness acute onset this morning  Patient was standing trying to get in the car felt dizzy and stumbled, no head strike or fall  Patient not complaining of any pain but does state that he is still dizzy  Patient has a history of aortic thoracic dissection without rupture  Graft was placed roughly 10 years ago    Denies taking any beta blockers but does state that he took a verapamil half tablet, 20 mg, as per his normal in the morning  Patient was found to be bradycardic on exam and with this dizziness will treat with atropine 0 5  Patient responded well to this and heart rate went up into the high 50s and 60s  Patient states no change in dizziness  Past replacement patient's chest per precaution  Abdominal pain seems to be chronic but patient does state that he had this same pain during the dizziness  CBC, CMP, troponin, EKG, chest x-ray, CTA dissection study with abdomen pelvis  Repeat EKG after atropine showed sinus rhythm with first-degree AV block  Initial EKG only showed bradycardia with borderline WA interval     Due to patient's risk factors as well as previous dissection will reach out to vascular  Vascular stated that no change in aorta does not require any vascular intervention  Disposition  Final diagnoses:   Symptomatic bradycardia     Time reflects when diagnosis was documented in both MDM as applicable and the Disposition within this note     Time User Action Codes Description Comment    12/4/2022  2:38 PM Vijay Mason Add [R00 1] Symptomatic bradycardia       ED Disposition     ED Disposition   Admit    Condition   Stable    Date/Time   Sun Dec 4, 2022  2:39 PM    Comment   Case was discussed with Gold Jimenez and the patient's admission status was agreed to be Admission Status: observation status to the service of Dr Jai Abdul   Follow-up Information     Follow up With Specialties Details Why Contact Info Additional Information    [address] Emergency Department Emergency Medicine Go to  If symptoms worsen [address] [facility] [address] [facility] Emergency Department, [address] [facility], [facility]    Petros Tejada MD Family Medicine Schedule an appointment as soon as possible for a visit  As needed [address]  [address]  [address] [facility] Patient's Medications   Discharge Prescriptions    No medications on file       No discharge procedures on file      PDMP Review     None          ED Provider  Electronically Signed by           Favian Rodriguez PA-C  12/04/22 2356

## 2022-12-04 NOTE — H&P
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, HTN, HLD, COPD, AMISHA, migraines, anxiety who presents after presyncopal episode  · Patient stated that today while he was on a  home presented with an acute dizziness and blurry vision accompanied with intermittent abdominal pain, diaphoresis while having the dizziness spell  Stated that he attempted to continue to walk to his car stumble per wife however never collapse  Denied chest pain, dyspnea, palpitations  · At the ED presented with dizziness with heart rate was noted 43 beats per minute where he received 1 dose of 0 5 atropine  · ECG:  Normal sinus rhythm with prolonged TN with first-degree AV block  · CTA dissection protocol showed: "Stable appearance of type B aortic dissection post endovascular repair  No acute findings in the chest, abdomen, or pelvis    · Follows Dr Avery Sic last visit in   · VAS carotid 2022:  Right and left internal carotid less than 50% stable  · Troponins 6--6, troponin 4 hours pending  · Home meds:  Verapamil 40 mg b i d , labetalol 100 mg b i d   · PE patient appears euvolemic on exam  · POA:  Dizziness seems like resolved, as well as abdominal pain  · Presyncopal episode most likely secondary vasovagal versus cardiac etiology versus medication  · Plan  · Monitor on telemetry  · Holding home verapamil and labetalol    Bradycardia  Assessment & Plan  · Presyncopal episode secondary to dizziness  · At the ED HR 43 beats per minute received 1 dose of atropine 0 5 mg heart rate stable on the 60s  · ECG noted sinus rhythm with first-degree AV block, right bundle shawn block   · Home meds chart review:  Labetalol 100 mg b i d , verapamil 40 mg b i d  · ECG:  Normal sinus rhythm with prolonged CO with first-degree AV block  · Plan  · Monitor in telemetry  · Follow-up TSH  · Continue holding home meds  · Monitor on overnight if reappearance of sentence consideration to consult Cardiology on the a m  Sadie Carreon Hypertension  Assessment & Plan  · History of essential hypertension  · Home meds:  Verapamil 40 mg b i d , labetalol 100 mg b i d  POA: Blood Pressure: 163/72  · Plan  · Holding home meds at this time  · Hydralazine 10 mg p r n  For systolic blood pressure over 180    Hyperlipidemia  Assessment & Plan  · History of hyperlipidemia  · Continue home meds Lipitor 40 mg once a day    VTE Pharmacologic Prophylaxis: VTE Score: 5 High Risk (Score >/= 5) - Pharmacological DVT Prophylaxis Ordered: enoxaparin (Lovenox)  Sequential Compression Devices Ordered  Code Status: Level 1 - Full Code discussed with patient and spouse at bedside  Discussion with family: Updated  (wife) at bedside  Anticipated Length of Stay: Patient will be admitted on an observation basis with an anticipated length of stay of less than 2 midnights secondary to Presyncopal episode and bradycardia  Chief Complaint:  Presyncope, dizziness    History of Present Illness:  Aaron Hall  is a 65 y o  male with a PMH of 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, hypertension, hyperlipidemia, COPD, AMISHA, PAD, migraines who presents after an episode of dizziness, blurred vision that started while he was on a [employer]  Patient stated that all of the sudden he started to feel dizzy with blurry vision and intermittent abdominal pain accompanied with diaphoresis  Stated that afterwards he started to ambulate chores car workup wife mentioned that he was stumbling on the pavement however never collapsed    Mention that the follows frequently with Dr Deja Alvarez on the outpatient setting for surveillance of thoracic aneurysm history last visit was about a year ago  Patient denied any chest pain, palpitation, shortness of breath  Denies any similar episode in the past   Denies any diarrhea, however endorse last bowel movement was about 3 days ago, denies any urinary symptoms  Endorse appropriate p o  intake included but not limited to diet and liquids  Prior smoker quit in the 80s denies any ethanol use as well as no drug use  Of note patient mentioned that about a year ago he fell on the pavement he did not the back of he said since then has been seen regularly in Neurology due to intermittent, intractable headaches the worsen with cough, sneezing where he was started on verapamil 40 mg twice a day  At the ED patient was noted with heart rate of 43 beats per minute with dizziness where he received 1 dose of 0 5 mg of atropine with increasing heart rate on 60s  ECG noted sinus rhythm with prolongation of the [place] anti first-degree AV block  CTA dissection protocol unchanged with stable graft  Vascular surgery consulted by ED providers who do not provide any further recommendations at this time  CBC, CMP, troponins unremarkable    Patient will be admitted to [facility] for observation and monitoring and management        Review of Systems:  Review of Systems   Constitutional: Positive for diaphoresis (Now resolved)  Negative for chills and fever  HENT: Negative for ear pain, sinus pressure, sore throat, tinnitus and trouble swallowing  Eyes: Negative for pain and visual disturbance  Respiratory: Negative for cough, chest tightness, shortness of breath and wheezing  Cardiovascular: Negative for chest pain, palpitations and leg swelling  Gastrointestinal: Positive for abdominal pain (Now resolved)  Negative for constipation, diarrhea, nausea and vomiting  Genitourinary: Negative for dysuria, flank pain, frequency, hematuria and urgency     Musculoskeletal: Negative for arthralgias, back pain and neck pain  Skin: Negative for color change, pallor and rash  Neurological: Positive for dizziness (Now resolved) and light-headedness (Now resolved)  Negative for tremors, seizures, syncope, speech difficulty, weakness, numbness and headaches  All other systems reviewed and are negative  Past Medical and Surgical History:   Past Medical History:   Diagnosis Date   • Anxiety    • Aortic dissection ([address] )    • Brain injury     Hx of fall on ice in winter 2021- [facility] notices occasional sharp pains in head with cough    • Colon polyp    • COPD (chronic obstructive pulmonary disease) ([facility])     PCP states mild   • CPAP (continuous positive airway pressure) dependence    • Depression    • Hyperlipidemia    • Hypertension    • Kidney stone    • Rectal bleeding     hx of when he was taking Eliquis   • Renal cyst    • Renal cyst    • Sleep apnea    • Sleep apnea, obstructive        Past Surgical History:   Procedure Laterality Date   • ABDOMINAL AORTIC ANEURYSM REPAIR      about 20 years ago   • CELIAC ARTERY STENT     • COLONOSCOPY     • KIDNEY STONE SURGERY         Meds/Allergies:  Prior to Admission medications    Medication Sig Start Date End Date Taking?  Authorizing Provider   acetaminophen (TYLENOL) 325 mg tablet Take 3 tablets (975 mg total) by mouth every 8 (eight) hours as needed for mild pain 2/8/21  Yes Margot Little PA-C   ALPRAZolam (XANAX) 0 25 mg tablet take 1 tablet by mouth at bedtime if needed for anxiety 9/26/22  Yes CATY Cooper   aspirin 81 MG tablet Take 1 tablet by mouth daily   3/30/15  Yes Historical Provider, MD   atorvastatin (LIPITOR) 40 mg tablet Take 40 mg by mouth daily 4/9/18  Yes Historical Provider, MD   Cholecalciferol (Vitamin D3) 25 MCG (1000 UT) CAPS Take 1,000 Units by mouth daily   Yes Historical Provider, MD   DULoxetine (CYMBALTA) 60 mg delayed release capsule Take 1 capsule (60 mg total) by mouth daily 1/19/22 Yes CATY Cooper   labetalol (NORMODYNE) 200 mg tablet Take 100 mg by mouth 2 (two) times a day 6/15/20  Yes Historical Provider, MD   verapamil (CALAN) 40 mg tablet take 1 tablet by mouth twice a day 22  Yes Deandra Silva PA-C   amoxicillin-clavulanate (AUGMENTIN) 875-125 mg per tablet Take 1 tablet by mouth 2 (two) times a day  Patient not taking: Reported on 2022 3/14/22   Historical Provider, MD   Galcanezumab-gnlm (Emgality) 120 MG/ML SOAJ One ml subcutaneous on the right thigh and 1 ml subcutaneous on the left thigh at the same time for 1 dose  Patient not taking: Reported on 2022   Deandra Silva PA-C   Galcanezumab-gnlm (Emgality) 120 MG/ML SOAJ 30 days after loading dose, inject 1 pen subq every 30 days  Patient not taking: Reported on 2022   Deandra Silva PA-C     I have reviewed home medications with patient personally  Allergies:    Allergies   Allergen Reactions   • Lorazepam Hyperactivity       Social History:  Marital Status: /Civil Union   Occupation:  Retired  Patient Pre-hospital Living Situation: Home, With spouse  Patient Pre-hospital Level of Mobility: walks  Patient Pre-hospital Diet Restrictions:  None  Substance Use History:   Social History     Substance and Sexual Activity   Alcohol Use No     Social History     Tobacco Use   Smoking Status Former   • Packs/day: 1 00   • Years: 24 00   • Pack years: 24 00   • Types: Cigarettes   • Start date: 1959   • Quit date: 1983   • Years since quittin 6   Smokeless Tobacco Never     Social History     Substance and Sexual Activity   Drug Use No       Family History:  Family History   Problem Relation Age of Onset   • Stroke Mother    • Prostate cancer Father        Physical Exam:     Vitals:   Blood Pressure: 163/72 (22 1734)  Pulse: 57 (22 1734)  Temperature: 98 4 °F (36 9 °C) (22 173)  Temp Source: Axillary (22 1204)  Respirations: 16 (22 1734)  Height: 5' 11" (180 3 cm) (12/04/22 1204)  Weight - Scale: 91 1 kg (200 lb 13 4 oz) (12/04/22 1204)  SpO2: 97 % (12/04/22 1734)    Physical Exam  Vitals and nursing note reviewed  Constitutional:       General: He is not in acute distress  Appearance: He is well-developed  He is not ill-appearing  HENT:      Head: Normocephalic and atraumatic  Right Ear: External ear normal       Left Ear: External ear normal       Nose: Nose normal       Mouth/Throat:      Mouth: Mucous membranes are moist    Eyes:      General: No scleral icterus  Extraocular Movements: Extraocular movements intact  Conjunctiva/sclera: Conjunctivae normal       Pupils: Pupils are equal, round, and reactive to light  Cardiovascular:      Rate and Rhythm: Normal rate and regular rhythm  Heart sounds: No murmur heard  Pulmonary:      Effort: Pulmonary effort is normal  No respiratory distress  Breath sounds: Normal breath sounds  No wheezing  Chest:      Chest wall: No tenderness  Abdominal:      General: Bowel sounds are normal  There is no distension  Palpations: Abdomen is soft  Tenderness: There is no abdominal tenderness  There is no left CVA tenderness  Musculoskeletal:         General: No swelling  Cervical back: Normal range of motion and neck supple  Right lower leg: No edema  Left lower leg: No edema  Skin:     General: Skin is warm and dry  Capillary Refill: Capillary refill takes less than 2 seconds  Neurological:      General: No focal deficit present  Mental Status: He is alert and oriented to person, place, and time     Psychiatric:         Mood and Affect: Mood normal          Additional Data:     Lab Results:  Results from last 7 days   Lab Units 12/04/22  1215   WBC Thousand/uL 6 15   HEMOGLOBIN g/dL 13 3   HEMATOCRIT % 39 3   PLATELETS Thousands/uL 180   NEUTROS PCT % 63   LYMPHS PCT % 25   MONOS PCT % 9   EOS PCT % 2     Results from last 7 days Lab Units 12/04/22  1215   SODIUM mmol/L 139   POTASSIUM mmol/L 4 2   CHLORIDE mmol/L 106   CO2 mmol/L 28   BUN mg/dL 24   CREATININE mg/dL 1 29   ANION GAP mmol/L 5   CALCIUM mg/dL 9 1   ALBUMIN g/dL 3 8   TOTAL BILIRUBIN mg/dL 0 94   ALK PHOS U/L 75   ALT U/L 11   AST U/L 17   GLUCOSE RANDOM mg/dL 110         Results from last 7 days   Lab Units 12/04/22  1224   POC GLUCOSE mg/dl 112         Results from last 7 days   Lab Units 12/04/22  1234   LACTIC ACID mmol/L 0 9       Lines/Drains:  Invasive Devices     Peripheral Intravenous Line  Duration           Peripheral IV 12/04/22 Left Antecubital <1 day    Peripheral IV 12/04/22 Right Forearm <1 day                    Imaging: Reviewed radiology reports from this admission including: CTA dissection protocol  CTA dissection protocol chest/abdomen/pelvis   Final Result by Dayan Hoffmann MD (12/04 1413)      1  Stable appearance of type B aortic dissection post endovascular repair  2   No acute findings in the chest, abdomen, or pelvis  Workstation performed: [id number]         XR chest 1 view portable   Final Result by Raúl Houston MD (12/04 1419)      No acute cardiopulmonary disease  Workstation performed: [id number]             EKG and Other Studies Reviewed on Admission:   · EKG: Sinus rhythm heart rate 61 beats per minute with TX prolongation first-degree AV block  ** Please Note: This note has been constructed using a voice recognition system   **

## 2022-12-04 NOTE — ASSESSMENT & PLAN NOTE
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, HTN, HLD, COPD, AMISHA, migraines, anxiety who presents after presyncopal episode  · Patient stated that today while he was on a  home presented with an acute dizziness and blurry vision accompanied with intermittent abdominal pain, diaphoresis while having the dizziness spell  Stated that he attempted to continue to walk to his car stumble per wife however never collapse  Denied chest pain, dyspnea, palpitations  · At the ED presented with dizziness with heart rate was noted 43 beats per minute where he received 1 dose of 0 5 atropine  · ECG:  Normal sinus rhythm with prolonged SD with first-degree AV block  · CTA dissection protocol showed: "Stable appearance of type B aortic dissection post endovascular repair  No acute findings in the chest, abdomen, or pelvis    · Follows Dr Eleanor Polanco last visit in   · VAS carotid 2022:  Right and left internal carotid less than 50% stable  · Troponins 6--6, troponin 4 hours pending  · Home meds:  Verapamil 40 mg b i d , labetalol 100 mg b i d   · PE patient appears euvolemic on exam  · POA:  Dizziness seems like resolved, as well as abdominal pain  · Presyncopal episode most likely secondary vasovagal versus cardiac etiology versus medication  · Plan  · Monitor on telemetry  · Holding home verapamil and labetalol

## 2022-12-05 VITALS
DIASTOLIC BLOOD PRESSURE: 60 MMHG | OXYGEN SATURATION: 96 % | HEIGHT: 71 IN | WEIGHT: 197.75 LBS | TEMPERATURE: 98.6 F | SYSTOLIC BLOOD PRESSURE: 134 MMHG | HEART RATE: 58 BPM | BODY MASS INDEX: 27.69 KG/M2 | RESPIRATION RATE: 18 BRPM

## 2022-12-05 LAB
ANION GAP SERPL CALCULATED.3IONS-SCNC: 7 MMOL/L (ref 4–13)
BUN SERPL-MCNC: 21 MG/DL (ref 5–25)
CALCIUM SERPL-MCNC: 9.1 MG/DL (ref 8.4–10.2)
CHLORIDE SERPL-SCNC: 107 MMOL/L (ref 96–108)
CO2 SERPL-SCNC: 25 MMOL/L (ref 21–32)
CREAT SERPL-MCNC: 1.22 MG/DL (ref 0.6–1.3)
ERYTHROCYTE [DISTWIDTH] IN BLOOD BY AUTOMATED COUNT: 12.5 % (ref 11.6–15.1)
GFR SERPL CREATININE-BSD FRML MDRD: 56 ML/MIN/1.73SQ M
GLUCOSE P FAST SERPL-MCNC: 89 MG/DL (ref 65–99)
GLUCOSE SERPL-MCNC: 89 MG/DL (ref 65–140)
HCT VFR BLD AUTO: 37.6 % (ref 36.5–49.3)
HGB BLD-MCNC: 12.9 G/DL (ref 12–17)
MAGNESIUM SERPL-MCNC: 1.9 MG/DL (ref 1.9–2.7)
MCH RBC QN AUTO: 32.5 PG (ref 26.8–34.3)
MCHC RBC AUTO-ENTMCNC: 34.3 G/DL (ref 31.4–37.4)
MCV RBC AUTO: 95 FL (ref 82–98)
PLATELET # BLD AUTO: 173 THOUSANDS/UL (ref 149–390)
PMV BLD AUTO: 8.9 FL (ref 8.9–12.7)
POTASSIUM SERPL-SCNC: 3.7 MMOL/L (ref 3.5–5.3)
RBC # BLD AUTO: 3.97 MILLION/UL (ref 3.88–5.62)
SODIUM SERPL-SCNC: 139 MMOL/L (ref 135–147)
WBC # BLD AUTO: 7.53 THOUSAND/UL (ref 4.31–10.16)

## 2022-12-05 RX ORDER — BLOOD PRESSURE TEST KIT
KIT MISCELLANEOUS DAILY
Qty: 1 KIT | Refills: 0 | Status: SHIPPED | OUTPATIENT
Start: 2022-12-05

## 2022-12-05 RX ORDER — AMLODIPINE BESYLATE 5 MG/1
5 TABLET ORAL DAILY
Qty: 30 TABLET | Refills: 5 | Status: SHIPPED | OUTPATIENT
Start: 2022-12-05

## 2022-12-05 RX ADMIN — DULOXETINE HYDROCHLORIDE 60 MG: 60 CAPSULE, DELAYED RELEASE ORAL at 09:54

## 2022-12-05 RX ADMIN — ASPIRIN 81 MG: 81 TABLET, COATED ORAL at 09:54

## 2022-12-05 RX ADMIN — ATORVASTATIN CALCIUM 40 MG: 40 TABLET, FILM COATED ORAL at 09:54

## 2022-12-05 RX ADMIN — ENOXAPARIN SODIUM 40 MG: 40 INJECTION SUBCUTANEOUS at 09:54

## 2022-12-05 RX ADMIN — Medication 1000 UNITS: at 09:54

## 2022-12-05 NOTE — DISCHARGE INSTR - AVS FIRST PAGE
Dear Lindsay Chiang ,     It was our pleasure to care for you here at [facility]  It is our hope that we were always able to exceed the expected standards for your care during your stay  You were hospitalized due to presyncope/almost fainting  You were cared for on the 3rd floor by Fermin Veras MD under the service of Marc Florian MD with the [facility] who covers for your primary care physician (PCP), Jules Cortez MD, while you were hospitalized  If you have any questions or concerns related to this hospitalization, you may contact us at [phone]  For follow up as well as any medication refills, we recommend that you follow up with your primary care physician  A registered nurse will reach out to you by phone within a few days after your discharge to answer any additional questions that you may have after going home  However, at this time we provide for you here, the most important instructions / recommendations at discharge:     Notable Medication Adjustments -   STOP taking your verapamil  Start taking Amlodipine 5 mg once daily  You can take this with your breakfast with other medications   Take your blood pressure and heart rate daily  If your heart rate is less than 60, do not take your labetalol dose  Testing Required after Discharge -   None  Important follow up information -   Vascular surgeon  PCP  Neurologist  Other Instructions -   If you develop severe pain that does not respond to medication, intractable vomiting or diarrhea, chest pain or shortness of breath, please seek emergent care in the ED  Please review this entire after visit summary as additional general instructions including medication list, appointments, activity, diet, any pertinent wound care, and other additional recommendations from your care team that may be provided for you        Sincerely,     Fermin Veras MD

## 2022-12-05 NOTE — ASSESSMENT & PLAN NOTE
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, HTN, HLD, COPD, AMISHA, migraines, anxiety who presents after presyncopal episode  · Patient stated that today while he was on a  home presented with an acute dizziness and blurry vision accompanied with intermittent abdominal pain, diaphoresis while having the dizziness spell  Stated that he attempted to continue to walk to his car stumble per wife however never collapse  Denied chest pain, dyspnea, palpitations  · At the ED presented with dizziness with heart rate was noted 43 beats per minute where he received 1 dose of 0 5 atropine  · ECG:  Normal sinus rhythm with prolonged MS with first-degree AV block  · CTA dissection protocol showed: "Stable appearance of type B aortic dissection post endovascular repair  No acute findings in the chest, abdomen, or pelvis    · Follows Dr Kev Lema last visit in   · VAS carotid 2022:  Right and left internal carotid less than 50% stable  · Troponins 6--6, troponin 4 hours pending  · Home meds:  Verapamil 40 mg b i d , labetalol 100 mg b i d   · PE patient appears euvolemic on exam  · POA:  Dizziness seems like resolved, as well as abdominal pain  · Presyncopal episode most likely secondary vasovagal versus cardiac etiology versus medication    Plan  · Can continue home labetalol  · Discontinue verapamil  · Start 5 mg amlodipine daily  · Follow up with vascular surgery

## 2022-12-05 NOTE — PLAN OF CARE
Problem: Prexisting or High Potential for Compromised Skin Integrity  Goal: Skin integrity is maintained or improved  Description: INTERVENTIONS:  - Identify patients at risk for skin breakdown  - Assess and monitor skin integrity  - Assess and monitor nutrition and hydration status  - Monitor labs   - Assess for incontinence   - Turn and reposition patient  - Assist with mobility/ambulation  - Relieve pressure over bony prominences  - Avoid friction and shearing  - Provide appropriate hygiene as needed including keeping skin clean and dry  - Evaluate need for skin moisturizer/barrier cream  - Collaborate with interdisciplinary team   - Patient/family teaching  - Consider wound care consult   Outcome: Adequate for Discharge     Problem: MOBILITY - ADULT  Goal: Maintain or return to baseline ADL function  Description: INTERVENTIONS:  -  Assess patient's ability to carry out ADLs; assess patient's baseline for ADL function and identify physical deficits which impact ability to perform ADLs (bathing, care of mouth/teeth, toileting, grooming, dressing, etc )  - Assess/evaluate cause of self-care deficits   - Assess range of motion  - Assess patient's mobility; develop plan if impaired  - Assess patient's need for assistive devices and provide as appropriate  - Encourage maximum independence but intervene and supervise when necessary  - Involve family in performance of ADLs  - Assess for home care needs following discharge   - Consider OT consult to assist with ADL evaluation and planning for discharge  - Provide patient education as appropriate  Outcome: Adequate for Discharge  Goal: Maintains/Returns to pre admission functional level  Description: INTERVENTIONS:  - Perform BMAT or MOVE assessment daily    - Set and communicate daily mobility goal to care team and patient/family/caregiver  - Collaborate with rehabilitation services on mobility goals if consulted  - Perform Range of Motion  times a day    - Reposition patient every  hours    - Dangle patient  times a day  - Stand patient  times a day  - Ambulate patient  times a day  - Out of bed to chair  times a day   - Out of bed for meal times a day  - Out of bed for toileting  - Record patient progress and toleration of activity level   Outcome: Adequate for Discharge     Problem: Potential for Falls  Goal: Patient will remain free of falls  Description: INTERVENTIONS:  - Educate patient/family on patient safety including physical limitations  - Instruct patient to call for assistance with activity   - Consult OT/PT to assist with strengthening/mobility   - Keep Call bell within reach  - Keep bed low and locked with side rails adjusted as appropriate  - Keep care items and personal belongings within reach  - Initiate and maintain comfort rounds  - Make Fall Risk Sign visible to staff  - Offer Toileting every  Hours, in advance of need  - Initiate/Maintain alarm  - Obtain necessary fall risk management equipment:   - Apply yellow socks and bracelet for high fall risk patients  - Consider moving patient to room near nurses station  Outcome: Adequate for Discharge

## 2022-12-05 NOTE — DISCHARGE SUMMARY
[facility]  Discharge- Marlyce Dilling  1945, 65 y o  male MRN: [id number]  Unit/Bed#: S -01 Encounter: [id number]  Primary Care Provider: Mary Lockett MD   Date and time admitted to hospital: 2022 12:01 PM    Bradycardia  Assessment & Plan  Lab Results   Component Value Date    [id number] 3 839 2022     · Presyncopal episode secondary to dizziness  · At the ED HR 43 beats per minute received 1 dose of atropine 0 5 mg heart rate stable on the 60s  · ECG noted sinus rhythm with first-degree AV block, right bundle shawn block  · Home meds chart review:  Labetalol 100 mg b i d , verapamil 40 mg b i d  · ECG:  Normal sinus rhythm with prolonged NM with first-degree AV block    Plan  · See plan for presyncope    Hyperlipidemia  Assessment & Plan  · History of hyperlipidemia  · Continue home meds Lipitor 40 mg once a day    Hypertension  Assessment & Plan  · History of essential hypertension  · Home meds:  Verapamil 40 mg b i d , labetalol 100 mg b i d  POA: Blood Pressure: 134/60    Plan  · See plan for presyncope    * Presyncope  Assessment & Plan  · 74-year-old male past medical history of 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, HTN, HLD, COPD, AMISHA, migraines, anxiety who presents after presyncopal episode  · Patient stated that today while he was on a  home presented with an acute dizziness and blurry vision accompanied with intermittent abdominal pain, diaphoresis while having the dizziness spell  Stated that he attempted to continue to walk to his car stumble per wife however never collapse  Denied chest pain, dyspnea, palpitations  · At the ED presented with dizziness with heart rate was noted 43 beats per minute where he received 1 dose of 0 5 atropine  · ECG:  Normal sinus rhythm with prolonged NM with first-degree AV block    · CTA dissection protocol showed: "Stable appearance of type B aortic dissection post endovascular repair  No acute findings in the chest, abdomen, or pelvis  · Follows Dr Robert Haij last visit in   · VAS carotid 2022:  Right and left internal carotid less than 50% stable  · Troponins 6--6, troponin 4 hours pending  · Home meds:  Verapamil 40 mg b i d , labetalol 100 mg b i d   · PE patient appears euvolemic on exam  · POA:  Dizziness seems like resolved, as well as abdominal pain  · Presyncopal episode most likely secondary vasovagal versus cardiac etiology versus medication    Plan  · Can continue home labetalol  · Discontinue verapamil  · Start 5 mg amlodipine daily  · Follow up with vascular surgery        Discharging Resident Physician: Olga Aguiar MD  Attending: No att  providers found  PCP: Francis Santos MD  Admission Date: 2022  Discharge Date: 22    Disposition:     Home    Reason for Admission: [place] [facility] Stay:  · None    Procedures Performed:     · None    Significant Findings / Test Results:     · None    Incidental Findings:   · None     Test Results Pending at Discharge (will require follow up): · None     Outpatient Tests Requested:  · None    Complications:  None    Hospital Course:     Aaron Hall  is a 65 y o  male w/PMHx thoracic dissection s/p graft placement 10 years ago, HTN, HLD, COPD, PAD, migraines who originally presented to the hospital on 2022 due to an episode of dizziness, blurred vision that started while he was attending a  that morning  Associated with abd pain and diaphoresis  His wife noted that he had started to stumble as well while attempting to ambulate and nearly fell  Pt remarks that he has had similar but brief and self resolving episodes earlier this month   His wife became concerned and brought him to the ED for further evaluation       In the ED patient was noted be bradycardic to 43 bpm and was given 1 dose of 0 5 mg of atropine with improvement in heart rate to 62s  ECG noted sinus rhythm with prolongation of the VA consistent with first-degree AV block  CTA dissection protocol unchanged with stable graft  Vascular surgery was consulted by ED providers but did not provide any further recommendations at this time  CBC, CMP, and troponins were otherwise unremarkable  He was admitted to [facility] for observation  Over the course of his brief admission pt remained asymptomatic  His rates remained between 50s-80s bpm (baseline resting rate around 60s per patient) without additional intervention  The following day pt was deemed stable for discharge  He was started on amlodipine 5 mg and discontinued on verapamil  He was instructed to continue taking his labetalol as prescribed  Condition at Discharge: stable     Discharge Day Visit / Exam:     Subjective:  No complaints at this time  Pt eager to go home  Vitals: Blood Pressure: 134/60 (12/05/22 0755)  Pulse: 58 (12/05/22 0755)  Temperature: 98 6 °F (37 °C) (12/05/22 0755)  Temp Source: Oral (12/05/22 0755)  Respirations: 18 (12/05/22 0755)  Height: 5' 11" (180 3 cm) (12/04/22 1204)  Weight - Scale: 89 7 kg (197 lb 12 oz) (12/05/22 0600)  SpO2: 96 % (12/05/22 0755)     Exam:   Physical Exam  Vitals reviewed  Constitutional:       General: He is not in acute distress  Appearance: He is not ill-appearing  HENT:      Head: Normocephalic and atraumatic  Right Ear: External ear normal       Left Ear: External ear normal       Nose: Nose normal       Mouth/Throat:      Mouth: Mucous membranes are moist       Pharynx: Oropharynx is clear  No oropharyngeal exudate or posterior oropharyngeal erythema  Eyes:      Extraocular Movements: Extraocular movements intact  Conjunctiva/sclera: Conjunctivae normal       Pupils: Pupils are equal, round, and reactive to light  Cardiovascular:      Rate and Rhythm: Normal rate and regular rhythm  Pulses: Normal pulses  Heart sounds: Normal heart sounds  Pulmonary:      Effort: Pulmonary effort is normal  No respiratory distress  Breath sounds: Normal breath sounds  Abdominal:      General: Abdomen is flat  Bowel sounds are normal  There is no distension  Palpations: Abdomen is soft  Tenderness: There is no abdominal tenderness  Lymphadenopathy:      Cervical: No cervical adenopathy  Skin:     General: Skin is warm and dry  Capillary Refill: Capillary refill takes less than 2 seconds  Neurological:      General: No focal deficit present  Mental Status: He is alert and oriented to person, place, and time  Sensory: No sensory deficit  Psychiatric:         Mood and Affect: Mood normal          Behavior: Behavior normal          Discharge instructions/Information to patient and family:   See after visit summary for information provided to patient and family  Provisions for Follow-Up Care:  See after visit summary for information related to follow-up care and any pertinent home health orders  Planned Readmission: No     Discharge Medications:  See after visit summary for reconciled discharge medications provided to patient and family        ** Please Note: This note has been constructed using a voice recognition system **

## 2022-12-05 NOTE — ASSESSMENT & PLAN NOTE
Lab Results   Component Value Date    [id number] 3 839 12/04/2022     · Presyncopal episode secondary to dizziness  · At the ED HR 43 beats per minute received 1 dose of atropine 0 5 mg heart rate stable on the 60s  · ECG noted sinus rhythm with first-degree AV block, right bundle shawn block  · Home meds chart review:  Labetalol 100 mg b i d , verapamil 40 mg b i d    · ECG:  Normal sinus rhythm with prolonged IL with first-degree AV block    Plan  · See plan for presyncope

## 2022-12-05 NOTE — ASSESSMENT & PLAN NOTE
· History of essential hypertension  · Home meds:  Verapamil 40 mg b i d , labetalol 100 mg b i d    POA: Blood Pressure: 134/60    Plan  · See plan for presyncope

## 2022-12-05 NOTE — PLAN OF CARE
Problem: Prexisting or High Potential for Compromised Skin Integrity  Goal: Skin integrity is maintained or improved  Description: INTERVENTIONS:  - Identify patients at risk for skin breakdown  - Assess and monitor skin integrity  - Assess and monitor nutrition and hydration status  - Monitor labs   - Assess for incontinence   - Turn and reposition patient  - Assist with mobility/ambulation  - Relieve pressure over bony prominences  - Avoid friction and shearing  - Provide appropriate hygiene as needed including keeping skin clean and dry  - Evaluate need for skin moisturizer/barrier cream  - Collaborate with interdisciplinary team   - Patient/family teaching  - Consider wound care consult   Outcome: Completed     Problem: MOBILITY - ADULT  Goal: Maintain or return to baseline ADL function  Description: INTERVENTIONS:  -  Assess patient's ability to carry out ADLs; assess patient's baseline for ADL function and identify physical deficits which impact ability to perform ADLs (bathing, care of mouth/teeth, toileting, grooming, dressing, etc )  - Assess/evaluate cause of self-care deficits   - Assess range of motion  - Assess patient's mobility; develop plan if impaired  - Assess patient's need for assistive devices and provide as appropriate  - Encourage maximum independence but intervene and supervise when necessary  - Involve family in performance of ADLs  - Assess for home care needs following discharge   - Consider OT consult to assist with ADL evaluation and planning for discharge  - Provide patient education as appropriate  Outcome: Completed  Goal: Maintains/Returns to pre admission functional level  Description: INTERVENTIONS:  - Perform BMAT or MOVE assessment daily    - Set and communicate daily mobility goal to care team and patient/family/caregiver  - Collaborate with rehabilitation services on mobility goals if consulted  - Perform Range of Motion  times a day  - Reposition patient every  hours    - Dangle patient  times a day  - Stand patient  times a day  - Ambulate patient  times a day  - Out of bed to chair  times a day   - Out of bed for meals  times a day  - Out of bed for toileting  - Record patient progress and toleration of activity level   Outcome: Completed     Problem: Potential for Falls  Goal: Patient will remain free of falls  Description: INTERVENTIONS:  - Educate patient/family on patient safety including physical limitations  - Instruct patient to call for assistance with activity   - Consult OT/PT to assist with strengthening/mobility   - Keep Call bell within reach  - Keep bed low and locked with side rails adjusted as appropriate  - Keep care items and personal belongings within reach  - Initiate and maintain comfort rounds  - Make Fall Risk Sign visible to staff  - Offer Toileting every  Hours, in advance of need  - Initiate/Maintain alarm  - Obtain necessary fall risk management equipment: Apply yellow socks and bracelet for high fall risk patients  - Consider moving patient to room near nurses station  Outcome: Completed

## 2022-12-12 ENCOUNTER — OFFICE VISIT (OUTPATIENT)
Dept: PSYCHIATRY | Facility: CLINIC | Age: 77
End: 2022-12-12

## 2022-12-12 DIAGNOSIS — F41.1 GAD (GENERALIZED ANXIETY DISORDER): ICD-10-CM

## 2022-12-12 DIAGNOSIS — F33.40 RECURRENT MAJOR DEPRESSIVE DISORDER, IN REMISSION (HCC): ICD-10-CM

## 2022-12-12 RX ORDER — ALPRAZOLAM 0.25 MG/1
0.25 TABLET ORAL
Qty: 90 TABLET | Refills: 1 | Status: SHIPPED | OUTPATIENT
Start: 2022-12-12

## 2022-12-12 RX ORDER — DULOXETIN HYDROCHLORIDE 60 MG/1
60 CAPSULE, DELAYED RELEASE ORAL DAILY
Qty: 90 CAPSULE | Refills: 3 | Status: SHIPPED | OUTPATIENT
Start: 2022-12-12

## 2022-12-12 NOTE — PSYCH
PROGRESS NOTE        [facility]      Name and Date of Birth:  Sarah Fowler  65 y o  1945    Date of Visit: 12/12/22    SUBJECTIVE:  Patient is a 34-year-old male who has a history major depressive disorder and anxiety disorder  Most recently, he was hospitalized briefly for syncope and hypotension  Now resolved  He is feeling much better physically  I did no weight loss but he tells me that this is intentional   Physically, he feels fairly well  Mentally, his anxiety is under fairly good control as well as his depression  Does worry a lot about his wife who serves as an extra caretaker for her mother who is 80years old and resides at [address] [place] [address]  Apparently, according to the patient, his wife takes on some of the care that the mother could receive at [address] [place] [address] but the mother declines some of the care offered at [address] [place] [address] and then  [place] on her daughter  So we had a discussion today regarding his role in this and if his wife wants to take on the extra care that her mother requires, that is really up to his wife  He understands that it comforts his wife to do this extra task and he himself is making every effort to resolve how he feels about it  Declines any counseling at this point  Continues on the current medication regimen without issue  He follows up with his PCP regularly  No other concerns  Follow-up in 6 months or sooner if necessary  He denies suicidal ideation, intent or plan at present, has no suicidal ideation, intent or plan at present  He denies any auditory hallucinations and visual hallucinations, denies any other delusional thinking, denies any delusional thinking  He denies any side effects from medications      HPI ROS Appetite Changes and Sleep: normal appetite, normal sleep    Review Of Systems:      Constitutional Negative   ENT Negative   Cardiovascular Negative   Respiratory As Noted in HPI   Gastrointestinal Negative   Genitourinary Negative   Musculoskeletal Negative   Integumentary Negative   Neurological Negative   Endocrine Negative   Other Symptoms Negative and None       Laboratory Results: No results found for this or any previous visit      Substance Abuse History:    Social History     Substance and Sexual Activity   Drug Use No       Family Psychiatric History:     Family History   Problem Relation Age of Onset   • Stroke Mother    • Prostate cancer Father        The following portions of the patient's history were reviewed and updated as appropriate: past family history, past medical history, past social history, past surgical history and problem list     Social History     Socioeconomic History   • Marital status: /Civil Union     Spouse name: Not on file   • Number of children: Not on file   • Years of education: Not on file   • Highest education level: Not on file   Occupational History   • Not on file   Tobacco Use   • Smoking status: Former     Packs/day: 1 00     Years: 24 00     Pack years: 24 00     Types: Cigarettes     Start date: 1959     Quit date: 1983     Years since quittin 6   • Smokeless tobacco: Never   Vaping Use   • Vaping Use: Never used   Substance and Sexual Activity   • Alcohol use: No   • Drug use: No   • Sexual activity: Never   Other Topics Concern   • Not on file   Social History Narrative   • Not on file     Social Determinants of Health     Financial Resource Strain: Not on file   Food Insecurity: Not on file   Transportation Needs: Not on file   Physical Activity: Not on file   Stress: Not on file   Social Connections: Not on file   Intimate Partner Violence: Not on file   Housing Stability: Not on file     Social History     Social History Narrative   • Not on file        Social History     Tobacco History     Smoking Status  Former Smoking Start Date  1959 Quit Date  1983 Smoking Frequency  1 pack/day for 24 00 years (24 00 pk-yrs)    Smoking Tobacco Type  Cigarettes from 4/26/1959 to 4/26/1983    Smokeless Tobacco Use  Never          Alcohol History     Alcohol Use Status  No          Drug Use     Drug Use Status  No          Sexual Activity     Sexually Active  Never          Activities of Daily Living    Not Asked                     OBJECTIVE:     Mental Status Evaluation:    Appearance age appropriate, casually dressed   Behavior pleasant, cooperative   Speech normal volume, normal pitch   Mood Stable mood   Affect Constricted and anxious at times   Thought Processes logical   Associations intact associations   Thought Content No overt delusion   Perceptual Disturbances: None   Abnormal Thoughts  Risk Potential Suicidal ideation - None  Homicidal ideation - None  Potential for aggression - No   Orientation oriented to person, place, time/date and situation   Memory recent and remote memory grossly intact   Cosciousness alert and awake   Attention Span attention span and concentration are age appropriate   Intellect Appears to be of Average Intelligence   Insight Good   Judgement Good   Muscle Strength and  Gait muscle strength and tone were normal   Language no difficulty naming common objects   Fund of Knowledge displays adequate knowledge of current events   Pain Denies   Pain Scale 0       Assessment/Plan:       Diagnoses and all orders for this visit:    DAVIE (generalized anxiety disorder)  -     ALPRAZolam (XANAX) 0 25 mg tablet; Take 1 tablet (0 25 mg total) by mouth daily at bedtime as needed for anxiety    Recurrent major depressive disorder, in remission (HCC)  -     DULoxetine (CYMBALTA) 60 mg delayed release capsule; Take 1 capsule (60 mg total) by mouth daily          Treatment Recommendations/Precautions:    Continue current medications:  Duloxetine 60 mg daily  Xanax 0 25 mg HS p r n    Follow-up in 6 months or sooner if necessary    Risks/Benefits      Risks, Benefits And Possible Side Effects Of Medications:    Risks, benefits, and possible side effects of medications explained to patient and patient verbalizes understanding and agreement for treatment  Controlled Medication Discussion:  Fills all prescriptions on time  No history of any overuse or abuse of any controlled substances  Psychotherapy Provided:     Individual psychotherapy provided: Yes  Spend the majority of the interview discussing ways to reduce stress and to reduce anxiety secondary to family issues

## 2022-12-12 NOTE — PSYCH
TREATMENT PLAN (Medication Management Only)  Treatment Plan done but not signed at time of office visit due to:  Plan reviewed by phone or in person  and verbal consent given due to [address]    Name and Date of Birth:  Floyd Seen  65 y o  1945  Date of Treatment Plan: December 12, 2022  Diagnosis/Diagnoses:    1  DAVIE (generalized anxiety disorder)    2  Recurrent major depressive disorder, in remission [facility])      Strengths/Personal Resources for Self-Care: supportive family, supportive friends  Area/Areas of need (in own words): anxiety symptoms  1  Long Term Goal: maintain acceptable anxiety level  Target Date: 6 months - 6/12/2023  Person/Persons responsible for completion of goal: Aaron  2  Short Term Objective (s) - How will we reach this goal?:   A  Provider new recommended medication/dosage changes and/or continue medication(s): continue current medications as prescribed  B   N/A  Target Date: 6 months - 6/12/2023  Person/Persons Responsible for Completion of Goal: Aaron  Progress Towards Goals: stable, continuing treatment  Treatment Modality: medication education at every visit  Review due 6 months from date of this plan: 6 months - 6/12/2023  Expected length of service: ongoing treatment unless revised  My Physician/PA/NP and I have developed this plan together and I agree to work on the goals and objectives  I understand the treatment goals that were developed for my treatment

## 2023-01-05 ENCOUNTER — OFFICE VISIT (OUTPATIENT)
Dept: VASCULAR SURGERY | Facility: CLINIC | Age: 78
End: 2023-01-05

## 2023-01-05 VITALS
DIASTOLIC BLOOD PRESSURE: 60 MMHG | WEIGHT: 197 LBS | TEMPERATURE: 97.8 F | HEART RATE: 80 BPM | BODY MASS INDEX: 27.58 KG/M2 | SYSTOLIC BLOOD PRESSURE: 100 MMHG | HEIGHT: 71 IN

## 2023-01-05 DIAGNOSIS — I71.012 DISSECTION OF DESCENDING THORACIC AORTA: Primary | ICD-10-CM

## 2023-01-05 PROBLEM — I71.00 AORTIC DISSECTION (HCC): Status: ACTIVE | Noted: 2023-01-05

## 2023-01-05 NOTE — LETTER
January 5, 2023     Kelly Roberts MD  [address] [place] [address]  [address]  [id number]    Patient: Brandon Light  YOB: 1945   Date of Visit: 1/5/2023       Dear Dr Sherin Nuñez: Thank you for referring Regulo Olivera to me for evaluation  Below are my notes for this consultation  If you have questions, please do not hesitate to call me  I look forward to following your patient along with you           Sincerely,        Lexa Pascal MD        CC: No Recipients

## 2023-01-05 NOTE — PATIENT INSTRUCTIONS
Presents for evaluation regarding history of type B dissection  His anatomy and perfusion are unchanged from previous imaging, widely patent carotid subclavian bypass to allow adequate seal by TEVAR  Overall he is doing well remains active no evidence of ischemia or cerebrovascular symptoms  Plan: He has a follow-up carotid, lower extremity arterial and aortoiliac study in April and we will follow-up appropriately at that time

## 2023-01-05 NOTE — PROGRESS NOTES
Assessment/Plan:    Presents for evaluation regarding history of type B dissection  His anatomy and perfusion are unchanged from previous imaging, widely patent carotid subclavian bypass to allow adequate seal by TEVAR  Overall he is doing well remains active no evidence of ischemia or cerebrovascular symptoms  Plan: He has a follow-up carotid, lower extremity arterial and aortoiliac study in April and we will follow-up appropriately at that time  Diagnoses and all orders for this visit:    Dissection of descending thoracic aorta        Subjective:      Patient ID: Buzz Gomes  is a 65 y o  male  Patient presents today to review CTA c/a/p done 12/4  Patient presented to [facility] on 12/4/22 due to acute dizziness, blurry vision, intermittent abdominal pain and sweating  Presents today for further evaluation  HPI    The following portions of the patient's history were reviewed and updated as appropriate: allergies, current medications, past family history, past medical history, past social history, past surgical history and problem list     Review of Systems   Constitutional: Negative  HENT: Negative  Eyes: Negative  Respiratory: Positive for apnea and shortness of breath  Cardiovascular: Negative  Gastrointestinal: Negative  Endocrine: Negative  Genitourinary: Negative  Musculoskeletal: Negative  Skin: Negative  Allergic/Immunologic: Negative  Neurological: Negative  Hematological: Negative  Psychiatric/Behavioral: Negative  Objective: There were no vitals taken for this visit  Physical Exam      Oriented x3 no evidence of clinical depression  Eyes:  Sclera non-icteric    Skin: normal without evidence of inflammation    Neck is supple carotid pulses equal bilaterally no bruits heard    Chest lungs clear, heart regular rhythm  Abdomen soft nontender no evidence of pulsatile masses      Pulses are palpable bilateral Femoral Popliteal, DP and PT  Neurological exam intact cranial nerves 2-12 grossly intact no gross motor sensory deficits detected  Imaging viewed and reviewed with Patient  I have reviewed and made appropriate changes to the review of systems input by the medical assistant      Vitals:    01/05/23 1022   BP: 100/60   BP Location: Left arm   Patient Position: Sitting   Pulse: 80   Temp: 97 8 °F (36 6 °C)   TempSrc: Tympanic   Weight: 89 4 kg (197 lb)   Height: 5' 11" (1 803 m)       Patient Active Problem List   Diagnosis   • Recurrent major depression in full remission ([facility] [address] )   • Renal cyst, acquired   • Obstructive sleep apnea (adult) (pediatric)   • Aneurysm of abdominal aorta   • Carotid atherosclerosis   • Hypertension   • Aneurysm of popliteal artery ([facility])   • Hyperlipidemia   • Thoracic aortic aneurysm without rupture   • Bilateral carotid artery stenosis   • PAD (peripheral artery disease) ([facility])   • Hx of adenomatous colonic polyps   • Gastroesophageal reflux   • Change in bowel habits   • Overweight   • Brain injury   • COPD (chronic obstructive pulmonary disease) ([facility])   • Anxiety   • Internal hemorrhoids   • Diverticulosis   • New onset of headaches after age 47   • Presyncope   • Other specified disorders of arteries and arterioles ([facility])    • Migraine without aura and without status migrainosus, not intractable   • Bradycardia   • Aortic dissection ([facility])       Past Surgical History:   Procedure Laterality Date   • ABDOMINAL AORTIC ANEURYSM REPAIR      about 20 years ago   • CELIAC ARTERY STENT     • COLONOSCOPY     • KIDNEY STONE SURGERY         Family History   Problem Relation Age of Onset   • Stroke Mother    • Prostate cancer Father        Social History     Socioeconomic History   • Marital status: /Civil Union     Spouse name: Not on file   • Number of children: Not on file   • Years of education: Not on file   • Highest education level: Not on file   Occupational History   • Not on file Tobacco Use   • Smoking status: Former     Packs/day: 1 00     Years: 24 00     Pack years: 24 00     Types: Cigarettes     Start date: 1959     Quit date: 1983     Years since quittin 7   • Smokeless tobacco: Never   Vaping Use   • Vaping Use: Never used   Substance and Sexual Activity   • Alcohol use: No   • Drug use: No   • Sexual activity: Never   Other Topics Concern   • Not on file   Social History Narrative   • Not on file     Social Determinants of Health     Financial Resource Strain: Not on file   Food Insecurity: Not on file   Transportation Needs: Not on file   Physical Activity: Not on file   Stress: Not on file   Social Connections: Not on file   Intimate Partner Violence: Not on file   Housing Stability: Not on file       Allergies   Allergen Reactions   • Lorazepam Hyperactivity         Current Outpatient Medications:   •  acetaminophen (TYLENOL) 325 mg tablet, Take 3 tablets (975 mg total) by mouth every 8 (eight) hours as needed for mild pain, Disp: , Rfl: 0  •  ALPRAZolam (XANAX) 0 25 mg tablet, Take 1 tablet (0 25 mg total) by mouth daily at bedtime as needed for anxiety, Disp: 90 tablet, Rfl: 1  •  aspirin 81 MG tablet, Take 1 tablet by mouth daily  , Disp: , Rfl:   •  atorvastatin (LIPITOR) 40 mg tablet, Take 40 mg by mouth daily, Disp: , Rfl: 0  •  Blood Pressure Monitor KIT, Use in the morning, Disp: 1 kit, Rfl: 0  •  Cholecalciferol (Vitamin D3) 25 MCG (1000 UT) CAPS, Take 1,000 Units by mouth daily, Disp: , Rfl:   •  DULoxetine (CYMBALTA) 60 mg delayed release capsule, Take 1 capsule (60 mg total) by mouth daily, Disp: 90 capsule, Rfl: 3  •  amLODIPine (NORVASC) 5 mg tablet, Take 1 tablet (5 mg total) by mouth daily (Patient not taking: Reported on 2023), Disp: 30 tablet, Rfl: 5  •  labetalol (NORMODYNE) 200 mg tablet, Take 100 mg by mouth 2 (two) times a day (Patient not taking: Reported on 2023), Disp: , Rfl:

## 2023-02-06 ENCOUNTER — OFFICE VISIT (OUTPATIENT)
Dept: CARDIOLOGY CLINIC | Facility: CLINIC | Age: 78
End: 2023-02-06

## 2023-02-06 VITALS
DIASTOLIC BLOOD PRESSURE: 72 MMHG | SYSTOLIC BLOOD PRESSURE: 152 MMHG | HEIGHT: 71 IN | OXYGEN SATURATION: 98 % | BODY MASS INDEX: 28.06 KG/M2 | WEIGHT: 200.4 LBS | HEART RATE: 74 BPM

## 2023-02-06 DIAGNOSIS — I71.012 DISSECTION OF DESCENDING THORACIC AORTA: ICD-10-CM

## 2023-02-06 DIAGNOSIS — I10 ESSENTIAL HYPERTENSION: ICD-10-CM

## 2023-02-06 DIAGNOSIS — I25.10 CORONARY ARTERY CALCIFICATION: ICD-10-CM

## 2023-02-06 DIAGNOSIS — R00.1 BRADYCARDIA: Primary | ICD-10-CM

## 2023-02-06 DIAGNOSIS — I25.84 CORONARY ARTERY CALCIFICATION: ICD-10-CM

## 2023-02-06 RX ORDER — LOSARTAN POTASSIUM AND HYDROCHLOROTHIAZIDE 12.5; 5 MG/1; MG/1
1 TABLET ORAL DAILY
Qty: 30 TABLET | Refills: 6 | Status: SHIPPED | OUTPATIENT
Start: 2023-02-06 | End: 2023-02-07

## 2023-02-06 NOTE — PATIENT INSTRUCTIONS
Start losartan/HCT 1 tab every morning  Amlodipine 5mg every night  Lab work for Analisa Wang in a week  Following healthy lifestyle will help lower your blood pressure:   Increase physical activity  Low-salt diet rich in fruits and vegetables  Avoid alcohol intake  Maintain healthy weight  Do not smoke or use tobacco     Take medications as instructed  Practice meditation and stress reduction  Heart healthy nutrition:        Mediterranean diet: A heart-healthy eating plan    What is the Mediterranean diet? The Mediterranean diet is a way of eating based on the traditional cuisine of countries bordering the [facility]  While there is no single definition of the Mediterranean diet, it is typically high in vegetables, fruits, whole grains, beans, nut and seeds, and olive oil  The main components of Mediterranean diet include:    Daily consumption of vegetables, fruits, whole grains and healthy fats  Weekly intake of fish, poultry, beans and eggs  Moderate portions of dairy products  Limited intake of red meat  Other important elements of the Mediterranean diet are sharing meals with family and friends    Plant based, not meat based  The foundation of the Mediterranean diet is vegetables, fruits, herbs, nuts, beans and whole grains  Meals are built around these plant-based foods  Small amounts of dairy, poultry and eggs are also central to the Mediterranean Diet, as is seafood  In contrast, red meat is eaten only occasionally  Healthy fats  Healthy fats are a mainstay of the Mediterranean diet  They're eaten instead of less healthy fats, such as saturated and trans fats, which contribute to heart disease  Olive oil is the primary source of added fat in the Mediterranean diet  Olive oil provides monounsaturated fat, which has been found to lower total cholesterol and low-density lipoprotein (LDL or "bad") cholesterol levels   Nuts and seeds also contain monounsaturated fat     Fish are also important in the [address]  Fatty fish -- such as mackerel, herring, sardines, albacore tuna, salmon and lake trout -- are rich in omega-3 fatty acids, a type of polyunsaturated fat that may reduce inflammation in the body  Omega-3 fatty acids also help decrease triglycerides, reduce blood clotting, and decrease the risk of stroke and heart failure  Eating the [address]  Interested in trying the [address]? These tips will help you get started:    Eat more fruits and vegetables  Aim for 7 to 10 servings a day of fruit and vegetables  Opt for whole grains  Switch to whole-grain bread, cereal and pasta  [place] with other whole grains  Use healthy fats  Try olive oil as a replacement for butter when cooking  Instead of putting butter or margarine on bread, try dipping it in flavored olive oil  Eat more seafood  Eat fish twice a week  Fresh or water-packed tuna, salmon, trout, mackerel and herring are healthy choices  Grilled fish tastes good and requires little cleanup  Avoid deep-fried fish  Reduce red meat  Substitute fish, poultry or beans for meat  If you eat meat, make sure it's lean and keep portions small  Spice it up  Herbs and spices boost flavor and lessen the need for salt  The Mediterranean diet is a delicious and healthy way to eat  Many people who switch to this style of eating say they'll never eat any other way      Source: http://www radha-jin org/

## 2023-02-06 NOTE — PROGRESS NOTES
[facility] [address]  [address]  [place] [address]  Phone#  [phone]  Fax#  [phone]  [facility]'s Cardiology Office Consultation             NAME: Barbara Mcclain  AGE: 65 y o  SEX: male   : 1945   MRN: [id number]    DATE: 2023  TIME: 8:44 AM    Assessment and plan:    Cardiology Problem list:  Bradycardia: Beta-blockers and verapamil stopped   Hypertension: Losartan added   Echo: 3/21: EF 60, grade 1 diastolic dysfunction, mild to moderate AI, mild MR  Coronary artery calcification (moderate)  Type B thoracic aortic dissection status post TEVAR , left subclavian occlusion, carotid subclavian bypass  Flow in false lumen distal to TEVAR  Right renal supplied both by false and true lumen  Celiac, SMA and left renal supplied by true lumen:  Vascular surgery follows        Symptomatic bradycardia:  Now resolved after stopping labetalol and verapamil  EKG now: NSR, RBBB, rate 72  Occasionally has dizziness with position change but no documented hypotension or bradycardia  Coronary calcification:  Chest CT personally reviewed  Moderate multivessel coronary artery calcification noted  No angina reported  No previous ischemic evaluation  Currently on optimal therapy for coronary disease including aspirin and statins  Reports sedentary lifestyle  Advised starting regular walking  If any limiting dyspnea or angina reported, stress testing will be planned  Continue ongoing risk factor modification  HTN:  Recently taken off the labetalol and verapamil after an episode of bradycardia and hypotension  He was placed on low-dose amlodipine  Home -180 persistently  No further hypotension and low HR episodes  Start losartan/HCT 1 tab every morning  Amlodipine 5mg every night  Lab work for Analisa Wang in a week      Thoracic aortic dissection  Status post TEVAR for type B dissection with left subclavian artery Amplatzer occlusion and left  carotid subclavian bypass  The left renal artery comes off the true lumen, celiac, SMA also, of the true lumen  Right renal has dual flow from the true lumen in the false lumen  Persistent abdominal aortic aneurysm at 5 1 cm, stable from   Recent CT personally reviewed and images shared with the patient  Dyslipidemia  Lab Results   Component Value Date    LDLCALC 61 2022   Continue high intensity statin therapy  Chief Complaint   Patient presents with   • New Patient Visit     No previous cardiologist- Pt seen  cardiac surgeon-  for Aortic Dissection     ER follow up Bradycardia    • Neck Pain     Posterior        HPI:    Ryanne Pastrana  is a 65y o -year-old male who presents to the cardiology clinic for initial evaluation  He has a prior history of aortic aneurysm with type B dissection status post TEVAR  He follows up with vascular surgery for that  Recent vascular evaluation and CT personally reviewed  Recent ER visit in  for complaints of dizziness, blurred vision and sweating  He was admitted to the hospital with a diagnosis of near syncope and finding of sinus bradycardia with first-degree AV block in the setting of verapamil and labetalol use  Verapamil was stopped and he was advised to continue  labetalol for hypertension  He was also placed on amlodipine at discharge  However he stopped taking his labetalol as well  Apparently last month he was at a  and his wife noticed him stumbling and complaining of dizziness  This prompted the ER visit  CTA showed stable type B dissection with endovascular repair  Carotid Doppler in 3/22 showed no significant stenosis  Since starting the verapamil and labetalol, his blood pressures have gradually crept up to the 160s to 170s range  No further bradycardia  He continues to have episodes of occasional dizziness/lightheadedness but no flor syncope  Reports a very sedentary lifestyle    He and his wife are going to start a walking program     Past history, family history, social history, current medications, vital signs, recent lab and imaging studies and  prior cardiology studies reviewed independently on this visit    Wt Readings from Last 3 Encounters:   02/06/23 90 9 kg (200 lb 6 4 oz)   01/05/23 89 4 kg (197 lb)   12/05/22 89 7 kg (197 lb 12 oz)     Pulse Readings from Last 3 Encounters:   02/06/23 74   01/05/23 80   12/05/22 58     BP Readings from Last 3 Encounters:   02/06/23 152/72   01/05/23 100/60   12/05/22 134/60       Allergies   Allergen Reactions   • Lorazepam Hyperactivity       Current Outpatient Medications:   •  acetaminophen (TYLENOL) 325 mg tablet, Take 3 tablets (975 mg total) by mouth every 8 (eight) hours as needed for mild pain, Disp: , Rfl: 0  •  ALPRAZolam (XANAX) 0 25 mg tablet, Take 1 tablet (0 25 mg total) by mouth daily at bedtime as needed for anxiety, Disp: 90 tablet, Rfl: 1  •  amLODIPine (NORVASC) 5 mg tablet, Take 1 tablet (5 mg total) by mouth daily, Disp: 30 tablet, Rfl: 5  •  aspirin 81 MG tablet, Take 1 tablet by mouth daily  , Disp: , Rfl:   •  atorvastatin (LIPITOR) 40 mg tablet, Take 40 mg by mouth daily, Disp: , Rfl: 0  •  Blood Pressure Monitor KIT, Use in the morning, Disp: 1 kit, Rfl: 0  •  Cholecalciferol (Vitamin D3) 25 MCG (1000 UT) CAPS, Take 1,000 Units by mouth daily, Disp: , Rfl:   •  DULoxetine (CYMBALTA) 60 mg delayed release capsule, Take 1 capsule (60 mg total) by mouth daily, Disp: 90 capsule, Rfl: 3  •  losartan-hydrochlorothiazide (HYZAAR) 50-12 5 mg per tablet, Take 1 tablet by mouth daily, Disp: 30 tablet, Rfl: 6  •  labetalol (NORMODYNE) 200 mg tablet, Take 100 mg by mouth 2 (two) times a day (Patient not taking: Reported on 1/5/2023), Disp: , Rfl:     Patient Active Problem List   Diagnosis   • Recurrent major depression in full remission ([facility] [address] )   • Renal cyst, acquired   • Obstructive sleep apnea (adult) (pediatric)   • Aneurysm of abdominal aorta   • Carotid atherosclerosis   • Hypertension   • Aneurysm of popliteal artery ([facility])   • Hyperlipidemia   • Thoracic aortic aneurysm without rupture   • Bilateral carotid artery stenosis   • PAD (peripheral artery disease) ([facility])   • Hx of adenomatous colonic polyps   • Gastroesophageal reflux   • Change in bowel habits   • Overweight   • Brain injury   • COPD (chronic obstructive pulmonary disease) ([facility])   • Anxiety   • Internal hemorrhoids   • Diverticulosis   • New onset of headaches after age 47   • Presyncope   • Other specified disorders of arteries and arterioles ([facility])    • Migraine without aura and without status migrainosus, not intractable   • Bradycardia   • Aortic dissection ([facility])   • Coronary artery calcification      Past Medical History:   Diagnosis Date   • Anxiety    • Aortic dissection ([facility])    • Brain injury     Hx of fall on ice in winter 2021- [facility] notices occasional sharp pains in head with cough    • Colon polyp    • COPD (chronic obstructive pulmonary disease) ([facility])     PCP states mild   • CPAP (continuous positive airway pressure) dependence    • Depression    • Hyperlipidemia    • Hypertension    • Kidney stone    • Rectal bleeding     hx of when he was taking Eliquis   • Renal cyst    • Renal cyst    • Sleep apnea    • Sleep apnea, obstructive      Past Surgical History:   Procedure Laterality Date   • ABDOMINAL AORTIC ANEURYSM REPAIR      about 20 years ago   • CELIAC ARTERY STENT     • COLONOSCOPY     • KIDNEY STONE SURGERY       Family History   Problem Relation Age of Onset   • Stroke Mother    • Prostate cancer Father      Social History   reports that he quit smoking about 39 years ago  His smoking use included cigarettes  He started smoking about 63 years ago  He has a 24 00 pack-year smoking history  He has never used smokeless tobacco  He reports that he does not drink alcohol and does not use drugs  Review of Systems   Constitutional: Negative  HENT: Negative  Eyes: Negative  Respiratory: Negative for cough and shortness of breath  Cardiovascular: Negative for chest pain and palpitations  Gastrointestinal: Negative  Endocrine: Negative  Genitourinary: Negative  Musculoskeletal: Negative  Skin: Negative  Allergic/Immunologic: Negative  Neurological: Positive for dizziness  Negative for syncope  Hematological: Does not bruise/bleed easily  Psychiatric/Behavioral: Negative  Objective:     Vitals:    02/06/23 0747   BP: 152/72   Pulse: 74   SpO2: 98%     Physical Exam  Vitals reviewed  Constitutional:       General: He is not in acute distress  HENT:      Head: Normocephalic  Right Ear: External ear normal       Left Ear: External ear normal    Eyes:      General: No scleral icterus  Neck:      Vascular: Carotid bruit present  Cardiovascular:      Rate and Rhythm: Normal rate and regular rhythm  Heart sounds: S1 normal and S2 normal  Murmur heard  Systolic murmur is present with a grade of 2/6  Comments: Left carotid and left subclavian bruit  Pulmonary:      Breath sounds: Normal breath sounds  No wheezing or rhonchi  Chest:      Comments: Systolic bruit over left upper chest  Abdominal:      General: There is no distension  Musculoskeletal:      Right lower leg: No edema  Left lower leg: No edema  Skin:     General: Skin is warm  Findings: No lesion  Neurological:      General: No focal deficit present  Mental Status: He is alert     Psychiatric:         Mood and Affect: Mood normal          Pertinent Laboratory/Diagnostic Studies:    Laboratory studies reviewed personally by Devika Fong MD    BMP:   Lab Results   Component Value Date    SODIUM 139 12/05/2022    K 3 7 12/05/2022     12/05/2022    CO2 25 12/05/2022    BUN 21 12/05/2022    CREATININE 1 22 12/05/2022    GLUC 89 12/05/2022    CALCIUM 9 1 12/05/2022     CBC:  Lab Results   Component Value Date    WBC 7 53 12/05/2022    RBC 3 97 12/05/2022    HGB 12 9 12/05/2022    HCT 37 6 12/05/2022    MCV 95 12/05/2022    MCH 32 5 12/05/2022    RDW 12 5 12/05/2022     12/05/2022     Coags:    Lipid Profile:   No results found for: CHOL  Lab Results   Component Value Date    HDL 46 11/16/2022     Lab Results   Component Value Date    LDLCALC 61 11/16/2022     Lab Results   Component Value Date    TRIG 138 11/16/2022      Other labs  Lab Results   Component Value Date    VDW6UMYPYYYC 3 839 12/04/2022     Lab Results   Component Value Date    ALT 11 12/04/2022    AST 17 12/04/2022     No results found for: BNP   No results for input(s): NTBNP in the last 72 hours  Lab Results   Component Value Date    TROPONINI <0 02 03/24/2019         Imaging Studies:     No results found  Visit diagnoses:  1  Bradycardia  POCT ECG      2  Dissection of descending thoracic aorta        3  Coronary artery calcification        4  Essential hypertension  losartan-hydrochlorothiazide (HYZAAR) 50-12 5 mg per tablet    Basic metabolic panel          Portions of the record may have been created with voice recognition software  Occasional wrong word or "sound alike" substitutions may have occurred due to the inherent limitations of voice recognition software  Read the chart carefully and recognize, using context, where substitutions have occurred  Please reach out to me directly for any clarifications

## 2023-02-07 DIAGNOSIS — I10 ESSENTIAL HYPERTENSION: ICD-10-CM

## 2023-02-07 RX ORDER — LOSARTAN POTASSIUM AND HYDROCHLOROTHIAZIDE 12.5; 5 MG/1; MG/1
TABLET ORAL
Qty: 90 TABLET | Refills: 6 | Status: SHIPPED | OUTPATIENT
Start: 2023-02-07

## 2023-02-14 ENCOUNTER — APPOINTMENT (OUTPATIENT)
Dept: LAB | Facility: CLINIC | Age: 78
End: 2023-02-14

## 2023-02-27 DIAGNOSIS — F33.40 RECURRENT MAJOR DEPRESSIVE DISORDER, IN REMISSION (HCC): ICD-10-CM

## 2023-02-27 RX ORDER — DULOXETIN HYDROCHLORIDE 60 MG/1
60 CAPSULE, DELAYED RELEASE ORAL DAILY
Qty: 90 CAPSULE | Refills: 3 | Status: SHIPPED | OUTPATIENT
Start: 2023-02-27

## 2023-03-09 ENCOUNTER — TELEPHONE (OUTPATIENT)
Dept: NEUROLOGY | Facility: CLINIC | Age: 78
End: 2023-03-09

## 2023-03-16 ENCOUNTER — OFFICE VISIT (OUTPATIENT)
Dept: NEUROLOGY | Facility: CLINIC | Age: 78
End: 2023-03-16

## 2023-03-16 VITALS
TEMPERATURE: 97.3 F | DIASTOLIC BLOOD PRESSURE: 52 MMHG | WEIGHT: 196 LBS | SYSTOLIC BLOOD PRESSURE: 104 MMHG | OXYGEN SATURATION: 96 % | BODY MASS INDEX: 27.34 KG/M2 | HEART RATE: 67 BPM

## 2023-03-16 DIAGNOSIS — G47.33 OBSTRUCTIVE SLEEP APNEA (ADULT) (PEDIATRIC): ICD-10-CM

## 2023-03-16 DIAGNOSIS — R51.9 NEW ONSET OF HEADACHES AFTER AGE 50: Primary | ICD-10-CM

## 2023-03-16 DIAGNOSIS — G43.009 MIGRAINE WITHOUT AURA AND WITHOUT STATUS MIGRAINOSUS, NOT INTRACTABLE: ICD-10-CM

## 2023-03-16 NOTE — PROGRESS NOTES
Patient ID: Génesis Schaffer  is a 77 y o  male  Assessment/Plan:       Diagnoses and all orders for this visit:    New onset of headaches after age 47    Migraine without aura and without status migrainosus, not intractable    Obstructive sleep apnea (adult) (pediatric)         The patient reports a resolve of headaches since transitioning from verapamil to amlodipine  Verapamil was initially started in January 2022 by another provider  He was not experiencing symptomatic bradycardia at that time since starting verapamil, that I know of, however he did develop this recently and was admitted to the hospital which prompted the switch to amlodipine  He has no recurrence of dizziness or lightheadedness or any problems with balance  He will continue baby aspirin daily, and follows with vascular for history of descending thoracic aorta dissection, also on 40 mg atorvastatin daily  Advised to use his CPAP for sleep apnea, and continue with sleep medicine  If he has recurrent headaches or any problems neurologically he should contact me  For now he can follow-up as needed, and he should not hesitate to contact this office with any questions or concerns  Subjective:    HPI    Mr  Génesis Schaffer  is here with his wife for neurological follow-up  I last saw him 9/15/2022 for migraine headaches  He tried several medications which were not effective for his HAs, and I tried to prescribe CGRP injectables but unfortunately these were not covered by his insurance or too costly  The patient was admitted to [address] [place] [address] [facility] 12/4/2022 for 24 hours  The patient had near syncope and it was thought to be secondary to bradycardia and first-degree heart block  Verapamil was discontinued and labetalol was continued for history of thoracic aortic arch dissection  Verapamil was initially started for chronic migraine headaches by neurology    When he discontinued it he stated that his headaches were not worse  Interestingly, in the past he denied any improvement in headaches after starting verapamil but it was continued anyway  The patient was started on amlodipine  After this whole venture, he reports a reduction and resolve of headaches  He is not sure if the amlodipine improved the headaches or if coming off of verapamil reduced his headaches  Today's blood pressure is 104/52, heart rate 67  He denies any dizziness or lightheaded symptoms  No trouble with balance  He has a follow-up with cardiology scheduled  On questioning the patient described his symptoms prior to admission which was dizziness which came out of the blue, and he felt like he was staggering  He states he was having a stressful day due to being at a  service for one of his friends, who was his best man when he was   He continues to drink a lot of water throughout the day  He is retired and stays busy with cognitive and physical activities as tolerated  He used to be a   He states he used pipo get a slight or quick head pain if he sneezed or coughed  He does not get headaches like he used to  He does not get headaches with bending over or vagal maneuvers  The following portions of the patient's history were reviewed and updated as appropriate:   He  has a past medical history of Anxiety, Aortic dissection ([address] ), Brain injury ([address] ), Colon polyp, COPD (chronic obstructive pulmonary disease) ([address] ), CPAP (continuous positive airway pressure) dependence, Depression, Hyperlipidemia, Hypertension, Kidney stone, Rectal bleeding, Renal cyst, Renal cyst, Sleep apnea, and Sleep apnea, obstructive    He   Patient Active Problem List    Diagnosis Date Noted   • Coronary artery calcification 2023   • Aortic dissection (HCC) 2023   • Bradycardia 2022   • Migraine without aura and without status migrainosus, not intractable 10/21/2022   • New onset of headaches after age 47 2021   • Presyncope 11/08/2021   • Other specified disorders of arteries and arterioles ([place] [address] )  11/08/2021   • Internal hemorrhoids 10/28/2021   • Diverticulosis 10/28/2021   • Brain injury [facility])    • COPD (chronic obstructive pulmonary disease) ([facility])    • Anxiety    • Gastroesophageal reflux 03/25/2021   • Change in bowel habits 03/25/2021   • Overweight 03/25/2021   • Hx of adenomatous colonic polyps 03/24/2021   • Bilateral carotid artery stenosis 03/18/2021   • PAD (peripheral artery disease) ([place] [address] ) 03/18/2021   • Thoracic aortic aneurysm without rupture ([place] [address] ) 11/26/2019   • Aneurysm of popliteal artery ([place] [address] ) 06/26/2019   • Hyperlipidemia 06/26/2019   • Obstructive sleep apnea (adult) (pediatric) 08/07/2018   • Carotid atherosclerosis 08/26/2016   • Recurrent major depression in full remission ([place] [address] ) 03/30/2016   • Renal cyst, acquired 04/17/2014   • Aneurysm of abdominal aorta ([place] [address] ) 02/06/2014   • Hypertension 03/28/2013     He  has a past surgical history that includes Celiac artery stent; Kidney stone surgery; Colonoscopy; and Abdominal aortic aneurysm repair  His family history includes Prostate cancer in his father; Stroke in his mother  He  reports that he quit smoking about 40 years ago  His smoking use included cigarettes  He started smoking about 64 years ago  He has a 24 00 pack-year smoking history  He has never used smokeless tobacco  He reports that he does not drink alcohol and does not use drugs    Current Outpatient Medications   Medication Sig Dispense Refill   • acetaminophen (TYLENOL) 325 mg tablet Take 3 tablets (975 mg total) by mouth every 8 (eight) hours as needed for mild pain  0   • ALPRAZolam (XANAX) 0 25 mg tablet Take 1 tablet (0 25 mg total) by mouth daily at bedtime as needed for anxiety 90 tablet 1   • amLODIPine (NORVASC) 5 mg tablet Take 1 tablet (5 mg total) by mouth daily 30 tablet 5   • aspirin 81 MG tablet Take 1 tablet by mouth daily       • atorvastatin (LIPITOR) 40 mg tablet Take 40 mg by mouth daily  0   • Blood Pressure Monitor KIT Use in the morning 1 kit 0   • Cholecalciferol (Vitamin D3) 25 MCG (1000 UT) CAPS Take 1,000 Units by mouth daily     • DULoxetine (CYMBALTA) 60 mg delayed release capsule Take 1 capsule (60 mg total) by mouth daily 90 capsule 3   • losartan-hydrochlorothiazide (HYZAAR) 50-12 5 mg per tablet take 1 tablet by mouth once daily 90 tablet 6     No current facility-administered medications for this visit  He is allergic to lorazepam          Objective:    Blood pressure 104/52, pulse 67, temperature (!) 97 3 °F (36 3 °C), temperature source Temporal, weight 88 9 kg (196 lb), SpO2 96 %  Body mass index is 27 34 kg/m²  Physical Exam    Neurological Exam  On neurologic exam, the patient is alert and oriented to time and place  Speech is fluent and articulate, and the patient follows commands appropriately  Judgment and affect appear normal  Pupils are equally round and reactive to light and extraocular muscles are intact without nystagmus  Face is symmetric, and tongue, uvula, and palate are midline  Hearing is intact  Motor examination reveals intact strength throughout  Normal gait is steady  No TTP of the neck, 5/5 neck flexors  ROS:    Review of Systems   Constitutional: Negative  Negative for appetite change and fever  HENT: Positive for ear pain (left ear pain sometimes)  Negative for hearing loss, tinnitus, trouble swallowing and voice change  Eyes: Negative  Negative for photophobia, pain and visual disturbance  Respiratory: Negative  Negative for shortness of breath  Cardiovascular: Negative  Negative for palpitations  Gastrointestinal: Negative  Negative for nausea and vomiting  Endocrine: Negative  Negative for cold intolerance  Genitourinary: Negative  Negative for dysuria, frequency and urgency  Musculoskeletal: Negative  Negative for gait problem, myalgias and neck pain  Skin: Negative  Negative for rash  Allergic/Immunologic: Negative  Neurological: Negative  Negative for dizziness, tremors, seizures, syncope, facial asymmetry, speech difficulty, weakness, light-headedness, numbness and headaches  Hematological: Negative  Does not bruise/bleed easily  Psychiatric/Behavioral: Negative  Negative for confusion, hallucinations and sleep disturbance  All other systems reviewed and are negative  The following portions of the patient's history were reviewed and updated as appropriate: allergies, current medications/ medication history, past family history, past medical history, past social history, past surgical history and problem list     Review of systems was reviewed and otherwise unremarkable from a neurological perspective

## 2023-03-16 NOTE — PATIENT INSTRUCTIONS
[facility]:  [phone]    Acupuncture:  Dylan Arthur- @[facility] Acupuncture- [phone] [phone] [facility]

## 2023-04-28 ENCOUNTER — HOSPITAL ENCOUNTER (OUTPATIENT)
Dept: NON INVASIVE DIAGNOSTICS | Facility: CLINIC | Age: 78
Discharge: HOME | End: 2023-04-28

## 2023-04-28 DIAGNOSIS — I71.40 ABDOMINAL AORTIC ANEURYSM (AAA) WITHOUT RUPTURE (HCC): ICD-10-CM

## 2023-04-28 DIAGNOSIS — I73.9 PAD (PERIPHERAL ARTERY DISEASE) (HCC): ICD-10-CM

## 2023-04-28 DIAGNOSIS — I65.23 BILATERAL CAROTID ARTERY STENOSIS: ICD-10-CM

## 2023-04-28 DIAGNOSIS — I72.4 ANEURYSM OF POPLITEAL ARTERY (HCC): ICD-10-CM

## 2023-05-01 ENCOUNTER — OFFICE VISIT (OUTPATIENT)
Dept: CARDIOLOGY CLINIC | Facility: CLINIC | Age: 78
End: 2023-05-01

## 2023-05-01 VITALS
HEART RATE: 94 BPM | HEIGHT: 71 IN | SYSTOLIC BLOOD PRESSURE: 118 MMHG | DIASTOLIC BLOOD PRESSURE: 60 MMHG | BODY MASS INDEX: 27.69 KG/M2 | OXYGEN SATURATION: 94 % | WEIGHT: 197.8 LBS

## 2023-05-01 DIAGNOSIS — N18.31 STAGE 3A CHRONIC KIDNEY DISEASE (HCC): Primary | ICD-10-CM

## 2023-05-01 NOTE — PROGRESS NOTES
[facility] [address]  [address]  [place] [address]  Phone#  [phone]  Fax#  [phone]  [facility] Cardiology Office Follow-up Visit             NAME: Florencia Bliss  AGE: 77 y o  SEX: male   : 1945   MRN: [id number]    DATE: 2023  TIME: 1:37 PM    Cardiology Problem list:  Bradycardia: Beta-blockers and verapamil stopped   Hypertension: Losartan added   Echo: 3/21: EF 60, grade 1 diastolic dysfunction, mild to moderate AI, mild MR  Coronary artery calcification (moderate)  Type B thoracic aortic dissection status post TEVAR , left subclavian occlusion, carotid subclavian bypass  Flow in false lumen distal to TEVAR  Right renal supplied both by false and true lumen  Celiac, SMA and left renal supplied by true lumen:  Vascular surgery follows      Assessment and plan:    Symptomatic bradycardia:  Resolved after stopping labetalol and verapamil  Rare episodes of dizziness with position change but no documented hypotension or bradycardia      Coronary calcification:  Moderate multivessel coronary artery calcification noted  No angina reported  No previous ischemic evaluation  Currently on optimal therapy for coronary disease including aspirin and statins  Reports sedentary lifestyle  If any limiting dyspnea or angina reported, stress testing will be planned  Continue ongoing risk factor modification      HTN:  Average home BP reviewed: 130-140s  Improved from last visit  CKD  Creat up with ARB  ,  Creat 1 4  Repeat lab work for BMP in a week      Thoracic aortic dissection  Status post TEVAR for type B dissection with left subclavian artery Amplatzer occlusion and left  carotid subclavian bypass  The left renal artery comes off the true lumen, celiac, SMA also, of the true lumen  Right renal has dual flow from the true lumen in the false lumen    Vascular images from  reviewed      Dyslipidemia  LDLCALC 61 11/16/2022  Continue high intensity statin therapy  Chief Complaint   Patient presents with    Follow-up     3 month f/u        HPI:    Paul Montano  is a 77y o -year-old male who presents to the cardiology clinic for follow up for the above-listed problems  Patient is doing well from a cardiovascular standpoint  No recent hospitalizations  Current medications reviewed  Reports compliance to medicines  No side effects reported  Denies chest pain on exertion  Denies worsening shortness of breath  Denies sustained palpitations  Planning a trip to [facility]  Vascular studies reviewed  Headaches better  Past history, family history, social history, current medications, vital signs, recent lab and imaging studies and  prior cardiology studies reviewed independently on this visit      Wt Readings from Last 3 Encounters:   05/01/23 89 7 kg (197 lb 12 8 oz)   03/16/23 88 9 kg (196 lb)   02/06/23 90 9 kg (200 lb 6 4 oz)     Pulse Readings from Last 3 Encounters:   05/01/23 94   03/16/23 67   02/06/23 74     BP Readings from Last 3 Encounters:   05/01/23 118/60   03/16/23 104/52   02/06/23 152/72       Allergies   Allergen Reactions    Lorazepam Hyperactivity       Current Outpatient Medications:     acetaminophen (TYLENOL) 325 mg tablet, Take 3 tablets (975 mg total) by mouth every 8 (eight) hours as needed for mild pain, Disp: , Rfl: 0    ALPRAZolam (XANAX) 0 25 mg tablet, Take 1 tablet (0 25 mg total) by mouth daily at bedtime as needed for anxiety, Disp: 90 tablet, Rfl: 1    amLODIPine (NORVASC) 5 mg tablet, Take 1 tablet (5 mg total) by mouth daily, Disp: 30 tablet, Rfl: 5    aspirin 81 MG tablet, Take 1 tablet by mouth daily  , Disp: , Rfl:     atorvastatin (LIPITOR) 40 mg tablet, Take 40 mg by mouth daily, Disp: , Rfl: 0    Blood Pressure Monitor KIT, Use in the morning, Disp: 1 kit, Rfl: 0    Cholecalciferol (Vitamin D3) 25 MCG (1000 UT) CAPS, Take 1,000 Units by mouth daily, Disp: , Rfl:     DULoxetine (CYMBALTA) 60 mg delayed release capsule, Take 1 capsule (60 mg total) by mouth daily, Disp: 90 capsule, Rfl: 3    losartan-hydrochlorothiazide (HYZAAR) 50-12 5 mg per tablet, take 1 tablet by mouth once daily, Disp: 90 tablet, Rfl: 6    Past Medical History:   Diagnosis Date    Anxiety     [facility] )     Brain injury ([facility] )     Hx of fall on ice in winter 2021- [facility] notices occasional sharp pains in head with cough     Colon polyp     COPD (chronic obstructive pulmonary disease) (HCC)     PCP states mild    CPAP (continuous positive airway pressure) dependence     Depression     Hyperlipidemia     Hypertension     Kidney stone     Rectal bleeding     hx of when he was taking Eliquis    Renal cyst     Renal cyst     Sleep apnea     Sleep apnea, obstructive      Past Surgical History:   Procedure Laterality Date    ABDOMINAL AORTIC ANEURYSM REPAIR      about 20 years ago    CELIAC ARTERY STENT      COLONOSCOPY      KIDNEY STONE SURGERY       Family History   Problem Relation Age of Onset    Stroke Mother     Prostate cancer Father      Social History   reports that he quit smoking about 40 years ago  His smoking use included cigarettes  He started smoking about 64 years ago  He has a 24 00 pack-year smoking history  He has never used smokeless tobacco  He reports that he does not drink alcohol and does not use drugs  Review of Systems   Constitutional: Negative for fever  Respiratory: Negative for chest tightness, shortness of breath and wheezing  Cardiovascular: Negative for chest pain, palpitations and leg swelling  Skin: Negative for rash  Neurological: Positive for dizziness  Negative for syncope  Hematological: Does not bruise/bleed easily  Objective:     Vitals:    05/01/23 1321   BP: 118/60   Pulse: 94   SpO2: 94%     Physical Exam  Vitals reviewed     Constitutional:       General: He is not in acute "distress  HENT:      Head: Normocephalic  Neck:      Vascular: Carotid bruit present  Cardiovascular:      Rate and Rhythm: Normal rate and regular rhythm  Heart sounds: S1 normal and S2 normal  Murmur heard  Crescendo systolic murmur is present with a grade of 2/6  Comments:  Left carotid and left subclavian bruit  Pulmonary:      Breath sounds: No wheezing or rhonchi  Musculoskeletal:      Right lower leg: No edema  Left lower leg: No edema  Skin:     General: Skin is warm  Neurological:      Mental Status: He is alert  Mental status is at baseline  Psychiatric:         Mood and Affect: Mood normal          Pertinent Laboratory/Diagnostic Studies:    Laboratory studies reviewed personally by Jossie Rodriguez MD    BMP:   Lab Results   Component Value Date    SODIUM 139 02/14/2023    K 3 7 02/14/2023     02/14/2023    CO2 30 02/14/2023    BUN 30 (H) 02/14/2023    CREATININE 1 45 (H) 02/14/2023    GLUC 89 12/05/2022    CALCIUM 9 6 02/14/2023     CBC:  Lab Results   Component Value Date    WBC 7 53 12/05/2022    RBC 3 97 12/05/2022    HGB 12 9 12/05/2022    HCT 37 6 12/05/2022    MCV 95 12/05/2022    MCH 32 5 12/05/2022    RDW 12 5 12/05/2022     12/05/2022     Coags:    Lipid Profile:   No results found for: CHOL  Lab Results   Component Value Date    HDL 46 11/16/2022     Lab Results   Component Value Date    LDLCALC 61 11/16/2022     Lab Results   Component Value Date    TRIG 138 11/16/2022      Other labs:  Lab Results   Component Value Date    TLR5UJXOVDJC 3 839 12/04/2022     Lab Results   Component Value Date    ALT 11 12/04/2022    AST 17 12/04/2022           Imaging Studies:     Pertinent imaging studies and cardiac studies were independently reviewed on this visit and findings summarized  Visit diagnoses  No diagnosis found  Martin Rosas MD, [facility]    Portions of the record may have been created with voice recognition software    Occasional wrong word or \"sound " "alike\" substitutions may have occurred due to the inherent limitations of voice recognition software  Read the chart carefully and recognize, using context, where substitutions have occurred  Please reach out to me directly for any clarifications    "

## 2023-05-02 ENCOUNTER — LAB (OUTPATIENT)
Dept: LAB | Facility: CLINIC | Age: 78
End: 2023-05-02

## 2023-05-02 DIAGNOSIS — N18.31 STAGE 3A CHRONIC KIDNEY DISEASE (HCC): ICD-10-CM

## 2023-05-02 LAB
ANION GAP SERPL CALCULATED.3IONS-SCNC: 6 MMOL/L (ref 4–13)
BUN SERPL-MCNC: 25 MG/DL (ref 5–25)
CALCIUM SERPL-MCNC: 9.6 MG/DL (ref 8.4–10.2)
CHLORIDE SERPL-SCNC: 103 MMOL/L (ref 96–108)
CO2 SERPL-SCNC: 31 MMOL/L (ref 21–32)
CREAT SERPL-MCNC: 1.21 MG/DL (ref 0.6–1.3)
GFR SERPL CREATININE-BSD FRML MDRD: 56 ML/MIN/1.73SQ M
GLUCOSE P FAST SERPL-MCNC: 107 MG/DL (ref 65–99)
POTASSIUM SERPL-SCNC: 3.7 MMOL/L (ref 3.5–5.3)
SODIUM SERPL-SCNC: 140 MMOL/L (ref 135–147)

## 2023-05-02 NOTE — RESULT ENCOUNTER NOTE
Kidney function is back to normal   Please continue current BP medications  Results sent to patient via [address]

## 2023-06-08 ENCOUNTER — OFFICE VISIT (OUTPATIENT)
Dept: VASCULAR SURGERY | Facility: CLINIC | Age: 78
End: 2023-06-08
Payer: MEDICARE

## 2023-06-08 VITALS
BODY MASS INDEX: 28 KG/M2 | SYSTOLIC BLOOD PRESSURE: 116 MMHG | RESPIRATION RATE: 18 BRPM | HEART RATE: 64 BPM | DIASTOLIC BLOOD PRESSURE: 66 MMHG | WEIGHT: 200 LBS | HEIGHT: 71 IN

## 2023-06-08 DIAGNOSIS — I72.4 ANEURYSM OF POPLITEAL ARTERY (HCC): ICD-10-CM

## 2023-06-08 DIAGNOSIS — I71.20 THORACIC AORTIC ANEURYSM WITHOUT RUPTURE, UNSPECIFIED PART (HCC): ICD-10-CM

## 2023-06-08 DIAGNOSIS — I65.23 ATHEROSCLEROSIS OF BOTH CAROTID ARTERIES: ICD-10-CM

## 2023-06-08 DIAGNOSIS — R55 POSTURAL DIZZINESS WITH PRESYNCOPE: ICD-10-CM

## 2023-06-08 DIAGNOSIS — I71.019 DISSECTION OF THORACIC AORTA, UNSPECIFIED PART (HCC): ICD-10-CM

## 2023-06-08 DIAGNOSIS — I73.9 PAD (PERIPHERAL ARTERY DISEASE) (HCC): ICD-10-CM

## 2023-06-08 DIAGNOSIS — I65.23 BILATERAL CAROTID ARTERY STENOSIS: Primary | ICD-10-CM

## 2023-06-08 DIAGNOSIS — R42 POSTURAL DIZZINESS WITH PRESYNCOPE: ICD-10-CM

## 2023-06-08 DIAGNOSIS — I71.40 ABDOMINAL AORTIC ANEURYSM (AAA) WITHOUT RUPTURE, UNSPECIFIED PART (HCC): ICD-10-CM

## 2023-06-08 DIAGNOSIS — I10 PRIMARY HYPERTENSION: ICD-10-CM

## 2023-06-08 PROCEDURE — 99214 OFFICE O/P EST MOD 30 MIN: CPT | Performed by: NURSE PRACTITIONER

## 2023-06-08 RX ORDER — AMLODIPINE BESYLATE 5 MG/1
5 TABLET ORAL DAILY
Qty: 90 TABLET | Refills: 3 | Status: SHIPPED | OUTPATIENT
Start: 2023-06-08

## 2023-06-08 NOTE — ASSESSMENT & PLAN NOTE
-LEAD showed diffuse bilateral lower extremity femoral-popliteal disease, no focal stenosis, right ULISES 1 39/91/40 and left ULISES 1 50/173/51  -Palpable pedal pulses  -No claudication symptoms  -Repeat duplex in 1 year  -Continue antiplatelet and statin therapy  -Follow-up in the office in 1 year

## 2023-06-08 NOTE — ASSESSMENT & PLAN NOTE
77year old male with HTN, HLD, type B aortic dissection s/p TEVAR 2011, left subclavian to carotid bypass in preparation of TEVAR, mild bilateral carotid stenosis, AAA, PAD, bilateral popliteal ectasia to returns to the office to review CV/AOIL/LEAD 4/28/23    -CV duplex showed bilateral ICA less than 50% stenosis, patent left CCA to subclavian artery bypass  -Blood pressure well controlled  -Continue aspirin 81 mg and atorvastatin  -Repeat carotid duplex in 1 year  -Follow-up in the office in 1 year

## 2023-06-08 NOTE — ASSESSMENT & PLAN NOTE
-AOIL measured proximal abdominal aorta 3 3 cm, 3 1 cm mid normal aorta at 3 3 cm distally, chronic aortic dissection of the juxtarenal abdominal aorta and aortic bifurcation, bilateral iliac vessels are patent, celiac artery 1 2 cm, SMA 1 1 cm  -Stable in comparison to prior duplex  -No vascular intervention  -Blood pressure well controlled  -Repeat duplex in 1 year  -Follow-up in the office in 1 year

## 2023-06-08 NOTE — PROGRESS NOTES
Assessment/Plan:    PAD (peripheral artery disease) (HCC)  -LEAD showed diffuse bilateral lower extremity femoral-popliteal disease, no focal stenosis, right ULISES 1 39/91/40 and left ULISES 1 50/173/51  -Palpable pedal pulses  -No claudication symptoms  -Repeat duplex in 1 year  -Continue antiplatelet and statin therapy  -Follow-up in the office in 1 year    Bilateral carotid artery stenosis  77year old male with HTN, HLD, type B aortic dissection s/p TEVAR 2011, left subclavian to carotid bypass in preparation of TEVAR, mild bilateral carotid stenosis, AAA, PAD, bilateral popliteal ectasia to returns to the office to review CV/AOIL/LEAD 4/28/23    -CV duplex showed bilateral ICA less than 50% stenosis, patent left CCA to subclavian artery bypass  -Blood pressure well controlled  -Continue aspirin 81 mg and atorvastatin  -Repeat carotid duplex in 1 year  -Follow-up in the office in 1 year      Aneurysm of popliteal artery (HCC)  -LEAD showed diffuse bilateral lower extremity femoral-popliteal disease, right popliteal artery measures 1 1 cm, right ULISES 1 39/91/40 and left popliteal measures 1 1 cm, left ULISES 1 5 0/173/51  -No vascular intervention  -Continue with surveillance duplex  -Repeat duplex in 1 year  -Blood pressure well controlled  -Continue antiplatelet and statin therapy  -Follow-up in the office in 1 year    Aneurysm of abdominal aorta ([address] )  -AOIL measured proximal abdominal aorta 3 3 cm, 3 1 cm mid normal aorta at 3 3 cm distally, chronic aortic dissection of the juxtarenal abdominal aorta and aortic bifurcation, bilateral iliac vessels are patent, celiac artery 1 2 cm, SMA 1 1 cm  -Stable in comparison to prior duplex  -No vascular intervention  -Blood pressure well controlled  -Repeat duplex in 1 year  -Follow-up in the office in 1 year        Diagnoses and all orders for this visit:    Bilateral carotid artery stenosis  -     VAS carotid complete study;  Future    Aneurysm of popliteal artery ([address] )  - VAS lower limb arterial duplex, complete bilateral; Future    Abdominal aortic aneurysm (AAA) without rupture, unspecified part ([facility] )  -     VAS abdominal aorta/iliacs; complete study; Future  -     VAS lower limb arterial duplex, complete bilateral; Future    Atherosclerosis of both carotid arteries  -     VAS carotid complete study; Future    Thoracic aortic aneurysm without rupture, unspecified part ([facility] )  -     VAS abdominal aorta/iliacs; complete study; Future    PAD (peripheral artery disease) (HCC)  -     VAS lower limb arterial duplex, complete bilateral; Future    Dissection of thoracic aorta, unspecified part ([facility])          Subjective:      Patient ID: Rodney Jama  is a 77 y o  male  Patient had a CV, AOIL, and GERMAN on 4/28/23  Pt denies TIA or CVA symptoms  Pt denies change in appetite, abdominal pain, or back pain  Pt c/o of hip pain and has to rest after about 1-2 blocks  Pt denies rest pain or wounds  Pt is on ASA 81 mg and Atorvastatin  HPI  77year old male with HTN, HLD, type B aortic dissection s/p TEVAR 2011, left subclavian to carotid bypass in preparation of TEVAR, mild bilateral carotid stenosis, AAA, PAD, bilateral popliteal ectasia to returns to the office to review CV/AOIL/LEAD 4/28/23  Patient last seen in the office in January by Dr Ramya Burris  He was hospitalized for dizziness last December  He denies any current dizziness or headache symptoms  He recently traveled to [facility]  He denies any claudication symptoms  He does have right hip pain with walking long distances  He denies any focal neurological events consistent with TIA/CVA  Denies any abdominal pain  No low back pain  Incident to billing    Dr Ramya Burris was in the office at time of visit  The following portions of the patient's history were reviewed and updated as appropriate: allergies, current medications, past family history, past medical history, past social history, past surgical history and problem "list   ROS reviewed     Review of Systems   Eyes: Negative for visual disturbance  Gastrointestinal: Negative for abdominal distention, constipation, diarrhea, nausea and vomiting  Musculoskeletal: Positive for arthralgias  Negative for back pain  Neurological: Negative for dizziness, facial asymmetry, speech difficulty, weakness and numbness  Psychiatric/Behavioral: Negative for confusion  Objective:    I have reviewed and made appropriate changes to the review of systems input by the medical assistant      Vitals:    06/08/23 0903 06/08/23 0905   BP: 110/60 116/66   BP Location: Left arm Right arm   Patient Position: Sitting Sitting   Pulse: 64    Resp: 18    Weight: 90 7 kg (200 lb)    Height: 5' 11\" (1 803 m)        Patient Active Problem List   Diagnosis   • Recurrent major depression in full remission ([facility] )   • Renal cyst, acquired   • Obstructive sleep apnea (adult) (pediatric)   • Aneurysm of abdominal aorta ([facility])   • Carotid atherosclerosis   • Hypertension   • Aneurysm of popliteal artery ([facility])   • Hyperlipidemia   • Thoracic aortic aneurysm without rupture ([facility])   • Bilateral carotid artery stenosis   • PAD (peripheral artery disease) ([facility])   • Hx of adenomatous colonic polyps   • Gastroesophageal reflux   • Change in bowel habits   • Overweight   • Brain injury ([facility] )   • COPD (chronic obstructive pulmonary disease) ([facility])   • Anxiety   • Internal hemorrhoids   • Diverticulosis   • New onset of headaches after age 47   • Presyncope   • Other specified disorders of arteries and arterioles ([facility])    • Migraine without aura and without status migrainosus, not intractable   • Bradycardia   • Aortic dissection ([facility])   • Coronary artery calcification       Past Surgical History:   Procedure Laterality Date   • ABDOMINAL AORTIC ANEURYSM REPAIR      about 20 years ago   • CELIAC ARTERY STENT     • COLONOSCOPY     • KIDNEY STONE SURGERY         Family History   Problem Relation Age of Onset   • Stroke " Mother    • Prostate cancer Father        Social History     Socioeconomic History   • Marital status: /Civil Union     Spouse name: Not on file   • Number of children: Not on file   • Years of education: Not on file   • Highest education level: Not on file   Occupational History   • Not on file   Tobacco Use   • Smoking status: Former     Packs/day: 1 00     Years: 24 00     Total pack years: 24 00     Types: Cigarettes     Start date: 1959     Quit date: 1983     Years since quittin 1   • Smokeless tobacco: Never   Vaping Use   • Vaping Use: Never used   Substance and Sexual Activity   • Alcohol use: No   • Drug use: No   • Sexual activity: Never   Other Topics Concern   • Not on file   Social History Narrative   • Not on file     Social Determinants of Health     Financial Resource Strain: Not on file   Food Insecurity: Not on file   Transportation Needs: Not on file   Physical Activity: Not on file   Stress: Not on file   Social Connections: Not on file   Intimate Partner Violence: Not on file   Housing Stability: Not on file       Allergies   Allergen Reactions   • Lorazepam Hyperactivity         Current Outpatient Medications:   •  acetaminophen (TYLENOL) 325 mg tablet, Take 3 tablets (975 mg total) by mouth every 8 (eight) hours as needed for mild pain, Disp: , Rfl: 0  •  ALPRAZolam (XANAX) 0 25 mg tablet, Take 1 tablet (0 25 mg total) by mouth daily at bedtime as needed for anxiety, Disp: 90 tablet, Rfl: 1  •  aspirin 81 MG tablet, Take 1 tablet by mouth daily  , Disp: , Rfl:   •  atorvastatin (LIPITOR) 40 mg tablet, Take 40 mg by mouth daily, Disp: , Rfl: 0  •  Cholecalciferol (Vitamin D3) 25 MCG (1000 UT) CAPS, Take 1,000 Units by mouth daily, Disp: , Rfl:   •  DULoxetine (CYMBALTA) 60 mg delayed release capsule, Take 1 capsule (60 mg total) by mouth daily, Disp: 90 capsule, Rfl: 3  •  losartan-hydrochlorothiazide (HYZAAR) 50-12 5 mg per tablet, take 1 tablet by mouth once daily, "Disp: 90 tablet, Rfl: 6  •  amLODIPine (NORVASC) 5 mg tablet, Take 1 tablet (5 mg total) by mouth daily (Patient not taking: Reported on 6/8/2023), Disp: 30 tablet, Rfl: 5  •  Blood Pressure Monitor KIT, Use in the morning, Disp: 1 kit, Rfl: 0    /66 (BP Location: Right arm, Patient Position: Sitting)   Pulse 64   Resp 18   Ht 5' 11\" (1 803 m)   Wt 90 7 kg (200 lb)   BMI 27 89 kg/m²          Physical Exam  Vitals and nursing note reviewed  Constitutional:       Appearance: Normal appearance  HENT:      Head: Normocephalic and atraumatic  Eyes:      Extraocular Movements: Extraocular movements intact  Neck:      Vascular: No carotid bruit  Cardiovascular:      Pulses:           Radial pulses are 2+ on the right side and 2+ on the left side  Femoral pulses are 2+ on the right side and 2+ on the left side  Popliteal pulses are 2+ on the right side and 2+ on the left side  Dorsalis pedis pulses are 2+ on the right side and 2+ on the left side  Heart sounds: Normal heart sounds  Pulmonary:      Breath sounds: Normal breath sounds  Abdominal:      Palpations: Abdomen is soft  Musculoskeletal:         General: No swelling  Normal range of motion  Skin:     General: Skin is warm  Neurological:      General: No focal deficit present  Mental Status: He is alert and oriented to person, place, and time     Psychiatric:         Mood and Affect: Mood normal          Behavior: Behavior normal          "

## 2023-06-08 NOTE — ASSESSMENT & PLAN NOTE
-LEAD showed diffuse bilateral lower extremity femoral-popliteal disease, right popliteal artery measures 1 1 cm, right ULISES 1 39/91/40 and left popliteal measures 1 1 cm, left ULISES 1 5 0/173/51  -No vascular intervention  -Continue with surveillance duplex  -Repeat duplex in 1 year  -Blood pressure well controlled  -Continue antiplatelet and statin therapy  -Follow-up in the office in 1 year

## 2023-06-13 ENCOUNTER — OFFICE VISIT (OUTPATIENT)
Dept: PSYCHIATRY | Facility: CLINIC | Age: 78
End: 2023-06-13
Payer: MEDICARE

## 2023-06-13 DIAGNOSIS — F33.1 MODERATE EPISODE OF RECURRENT MAJOR DEPRESSIVE DISORDER (HCC): Primary | ICD-10-CM

## 2023-06-13 DIAGNOSIS — F41.1 GAD (GENERALIZED ANXIETY DISORDER): ICD-10-CM

## 2023-06-13 PROCEDURE — 99214 OFFICE O/P EST MOD 30 MIN: CPT | Performed by: NURSE PRACTITIONER

## 2023-06-13 RX ORDER — ALPRAZOLAM 0.25 MG/1
0.25 TABLET ORAL
Qty: 90 TABLET | Refills: 2 | Status: SHIPPED | OUTPATIENT
Start: 2023-06-13

## 2023-06-13 NOTE — PSYCH
PROGRESS NOTE        [address] [facility]      Name and Date of Birth:  Chelsea Patino  77 y o  1945    Date of Visit: 06/13/23    SUBJECTIVE: She is a 28-year-old male has a history of major depressive disorder and anxiety disorder  Most recently, he and his wife and their family will guess in [facility]  They had a wonderful time and they were there for 10 days  He reported his wife had some trouble getting around because of ambulation issues but overall, they did fairly well  He tells me he is sleeping well and he is eating well  Depression is under control  His medical issues are stable for now  He and his wife get along very well and his children are very supportive and helpful  He will continue his current medication regimen for me and follow-up in 6 months or sooner if necessary  He denies suicidal ideation, intent or plan at present, has no suicidal ideation, intent or plan at present  He denies any auditory hallucinations and visual hallucinations, denies any other delusional thinking, denies any delusional thinking  He denies any side effects from medications    HPI ROS Appetite Changes and Sleep: normal appetite, normal sleep    Review Of Systems:      Constitutional Negative   ENT Negative   Cardiovascular Negative   Respiratory As Noted in HPI   Gastrointestinal Negative   Genitourinary Negative   Musculoskeletal Negative   Integumentary Negative   Neurological Negative   Endocrine Negative   Other Symptoms Negative and None       Laboratory Results: No results found for this or any previous visit      Substance Abuse History:    Social History     Substance and Sexual Activity   Drug Use No       Family Psychiatric History:     Family History   Problem Relation Age of Onset   • Stroke Mother    • Prostate cancer Father        The following portions of the patient's history were reviewed and updated as appropriate: past family history, past medical history, past social history, past surgical history and problem list     Social History     Socioeconomic History   • Marital status: /Civil Union     Spouse name: Not on file   • Number of children: Not on file   • Years of education: Not on file   • Highest education level: Not on file   Occupational History   • Not on file   Tobacco Use   • Smoking status: Former     Packs/day: 1 00     Years: 24 00     Total pack years: 24 00     Types: Cigarettes     Start date: 1959     Quit date: 1983     Years since quittin 1   • Smokeless tobacco: Never   Vaping Use   • Vaping Use: Never used   Substance and Sexual Activity   • Alcohol use: No   • Drug use: No   • Sexual activity: Never   Other Topics Concern   • Not on file   Social History Narrative   • Not on file     Social Determinants of Health     Financial Resource Strain: Not on file   Food Insecurity: Not on file   Transportation Needs: Not on file   Physical Activity: Not on file   Stress: Not on file   Social Connections: Not on file   Intimate Partner Violence: Not on file   Housing Stability: Not on file     Social History     Social History Narrative   • Not on file        Social History     Tobacco History     Smoking Status  Former Smoking Start Date  1959 Quit Date  1983 Smoking Frequency  1 pack/day for 24 00 years (24 00 ttl pk-yrs)    Smoking Tobacco Type  Cigarettes from 1959 to 1983    Smokeless Tobacco Use  Never          Alcohol History     Alcohol Use Status  No          Drug Use     Drug Use Status  No          Sexual Activity     Sexually Active  Never          Activities of Daily Living    Not Asked                     OBJECTIVE:     Mental Status Evaluation:    Appearance age appropriate, casually dressed   Behavior pleasant, cooperative   Speech normal volume, normal pitch   Mood  stable mood   Affect  bright affect   Thought Processes logical   Associations intact associations Thought Content  no overt delusions   Perceptual Disturbances: None   Abnormal Thoughts  Risk Potential Suicidal ideation - None  Homicidal ideation - None  Potential for aggression - No   Orientation oriented to person, place, time/date and situation   Memory recent and remote memory grossly intact   Cosciousness alert and awake   Attention Span attention span and concentration are age appropriate   Intellect Appears to be of Average Intelligence   Insight Good   Judgement Good   Muscle Strength and  Gait muscle strength and tone were normal   Language no difficulty naming common objects   Fund of Knowledge displays adequate knowledge of current events   Pain None   Pain Scale 0       Assessment/Plan:       Diagnoses and all orders for this visit:    Moderate episode of recurrent major depressive disorder (HCC)    DAVIE (generalized anxiety disorder)  -     ALPRAZolam (XANAX) 0 25 mg tablet; Take 1 tablet (0 25 mg total) by mouth daily at bedtime as needed for anxiety          Treatment Recommendations/Precautions:    Continue current medications:    Xanax 0 25 mg daily as needed for anxiety  Cymbalta 60 mg daily  Follow-up with me in 6 months or sooner if necessary  Risks/Benefits      Risks, Benefits And Possible Side Effects Of Medications:    Risks, benefits, and possible side effects of medications explained to patient and patient verbalizes understanding and agreement for treatment  Controlled Medication Discussion: Fills all prescriptions on time  No history of any overuse or abuse of controlled substances  No evidence of addiction or dependence  Psychotherapy Provided:     Individual psychotherapy provided: No   The visit started at 10 AM and ended at 10:25 AM   We did review the treatment plan, we reviewed medications and response to medications and completed the progress note

## 2023-06-13 NOTE — PSYCH
"TREATMENT PLAN (Medication Management Only)  Treatment Plan done but not signed at time of office visit due to:  Plan reviewed by phone or in person  and verbal consent given due to P JONATHAN  [id number]    Name and Date of Birth:  Larissa Arias y o  1945  Date of Treatment Plan: June 13, 2023  Diagnosis/Diagnoses:    1  Moderate episode of recurrent major depressive disorder ([address] )    2  DAVIE (generalized anxiety disorder)      Strengths/Personal Resources for Self-Care: supportive family, supportive friends  Area/Areas of need (in own words): \" Doing fairly well  I just got back from [facility]  \"   1  Long Term Goal: \" To continue to do well and function at his best\"  Target Date: 6 months - 12/13/2023  Person/Persons responsible for completion of goal: Aaron Moreno  Short Term Objective (s) - How will we reach this goal?:   A  Provider new recommended medication/dosage changes and/or continue medication(s): continue current medications as prescribed  B   N/A  Target Date: 6 months - 12/13/2023  Person/Persons Responsible for Completion of Goal: Ranjana Bonner  Progress Towards Goals: stable, continuing treatment  Treatment Modality: medication education at every visit  Review due 6 months from date of this plan: 6 months - 12/13/2023  Expected length of service: ongoing treatment unless revised  My Physician/PA/NP and I have developed this plan together and I agree to work on the goals and objectives  I understand the treatment goals that were developed for my treatment    "

## 2023-07-20 ENCOUNTER — HOSPITAL ENCOUNTER (OUTPATIENT)
Dept: RADIOLOGY | Facility: HOSPITAL | Age: 78
Discharge: HOME | End: 2023-07-20
Payer: MEDICARE

## 2023-07-20 DIAGNOSIS — R07.89 CHEST PAIN RADIATING TO UPPER EXTREMITY: ICD-10-CM

## 2023-07-20 PROCEDURE — 71046 X-RAY EXAM CHEST 2 VIEWS: CPT

## 2023-11-02 ENCOUNTER — APPOINTMENT (OUTPATIENT)
Dept: LAB | Facility: CLINIC | Age: 78
End: 2023-11-02
Payer: MEDICARE

## 2023-11-02 DIAGNOSIS — E78.5 HYPERLIPIDEMIA, UNSPECIFIED HYPERLIPIDEMIA TYPE: ICD-10-CM

## 2023-11-02 DIAGNOSIS — Z01.812 PRE-OPERATIVE LABORATORY EXAMINATION: ICD-10-CM

## 2023-11-02 LAB
25(OH)D3 SERPL-MCNC: 86.5 NG/ML (ref 30–100)
ALBUMIN SERPL BCP-MCNC: 4 G/DL (ref 3.5–5)
ALP SERPL-CCNC: 87 U/L (ref 34–104)
ALT SERPL W P-5'-P-CCNC: 12 U/L (ref 7–52)
ANION GAP SERPL CALCULATED.3IONS-SCNC: 6 MMOL/L
AST SERPL W P-5'-P-CCNC: 20 U/L (ref 13–39)
BASOPHILS # BLD AUTO: 0.05 THOUSANDS/ÂΜL (ref 0–0.1)
BASOPHILS NFR BLD AUTO: 1 % (ref 0–1)
BILIRUB SERPL-MCNC: 1.14 MG/DL (ref 0.2–1)
BUN SERPL-MCNC: 20 MG/DL (ref 5–25)
CALCIUM SERPL-MCNC: 9.5 MG/DL (ref 8.4–10.2)
CHLORIDE SERPL-SCNC: 101 MMOL/L (ref 96–108)
CHOLEST SERPL-MCNC: 123 MG/DL
CO2 SERPL-SCNC: 31 MMOL/L (ref 21–32)
CREAT SERPL-MCNC: 1.3 MG/DL (ref 0.6–1.3)
EOSINOPHIL # BLD AUTO: 0.17 THOUSAND/ÂΜL (ref 0–0.61)
EOSINOPHIL NFR BLD AUTO: 2 % (ref 0–6)
ERYTHROCYTE [DISTWIDTH] IN BLOOD BY AUTOMATED COUNT: 12.6 % (ref 11.6–15.1)
GFR SERPL CREATININE-BSD FRML MDRD: 51 ML/MIN/1.73SQ M
GLUCOSE P FAST SERPL-MCNC: 104 MG/DL (ref 65–99)
HCT VFR BLD AUTO: 40.9 % (ref 36.5–49.3)
HDLC SERPL-MCNC: 45 MG/DL
HGB BLD-MCNC: 14.1 G/DL (ref 12–17)
IMM GRANULOCYTES # BLD AUTO: 0.04 THOUSAND/UL (ref 0–0.2)
IMM GRANULOCYTES NFR BLD AUTO: 1 % (ref 0–2)
LDLC SERPL CALC-MCNC: 51 MG/DL (ref 0–100)
LYMPHOCYTES # BLD AUTO: 1.17 THOUSANDS/ÂΜL (ref 0.6–4.47)
LYMPHOCYTES NFR BLD AUTO: 13 % (ref 14–44)
MCH RBC QN AUTO: 33.2 PG (ref 26.8–34.3)
MCHC RBC AUTO-ENTMCNC: 34.5 G/DL (ref 31.4–37.4)
MCV RBC AUTO: 96 FL (ref 82–98)
MONOCYTES # BLD AUTO: 0.73 THOUSAND/ÂΜL (ref 0.17–1.22)
MONOCYTES NFR BLD AUTO: 8 % (ref 4–12)
NEUTROPHILS # BLD AUTO: 6.71 THOUSANDS/ÂΜL (ref 1.85–7.62)
NEUTS SEG NFR BLD AUTO: 75 % (ref 43–75)
NONHDLC SERPL-MCNC: 78 MG/DL
NRBC BLD AUTO-RTO: 0 /100 WBCS
PLATELET # BLD AUTO: 199 THOUSANDS/UL (ref 149–390)
PMV BLD AUTO: 8.8 FL (ref 8.9–12.7)
POTASSIUM SERPL-SCNC: 3.7 MMOL/L (ref 3.5–5.3)
PROT SERPL-MCNC: 6.7 G/DL (ref 6.4–8.4)
PSA SERPL-MCNC: 6.08 NG/ML (ref 0–4)
RBC # BLD AUTO: 4.25 MILLION/UL (ref 3.88–5.62)
SODIUM SERPL-SCNC: 138 MMOL/L (ref 135–147)
TRIGL SERPL-MCNC: 135 MG/DL
WBC # BLD AUTO: 8.87 THOUSAND/UL (ref 4.31–10.16)

## 2023-11-02 PROCEDURE — 85025 COMPLETE CBC W/AUTO DIFF WBC: CPT

## 2023-11-02 PROCEDURE — 80061 LIPID PANEL: CPT

## 2023-11-02 PROCEDURE — G0103 PSA SCREENING: HCPCS

## 2023-11-02 PROCEDURE — 80053 COMPREHEN METABOLIC PANEL: CPT

## 2023-11-02 PROCEDURE — 82306 VITAMIN D 25 HYDROXY: CPT

## 2023-11-02 PROCEDURE — 36415 COLL VENOUS BLD VENIPUNCTURE: CPT

## 2023-11-10 NOTE — TELEPHONE ENCOUNTER
Tried calling patient and he did not   Left the patient a voice message to give us a call back  You can access the FollowMyHealth Patient Portal offered by [facility] by registering at the following website: [email or web address]. By joining [facility]’s FollowMyHealth portal, you will also be able to view your health information using other applications (apps) compatible with our system.

## 2023-12-13 ENCOUNTER — OFFICE VISIT (OUTPATIENT)
Dept: PSYCHIATRY | Facility: CLINIC | Age: 78
End: 2023-12-13
Payer: MEDICARE

## 2023-12-13 PROCEDURE — 99214 OFFICE O/P EST MOD 30 MIN: CPT | Performed by: NURSE PRACTITIONER

## 2023-12-13 NOTE — PSYCH
TREATMENT PLAN (Medication Management Only)        [address] [facility]    Name and Date of Birth:  Xochitl Owens 77 y.o. 1945  Date of Treatment Plan: December 13, 2023  Diagnosis/Diagnoses:  No diagnosis found. Strengths/Personal Resources for Self-Care: supportive family, supportive friends. Area/Areas of need (in own words): " I am doing well.". 1. Long Term Goal: " To continue to feel good function at my best.". Target Date: 6 months - 6/13/2024  Person/Persons responsible for completion of goal: Aaron  2. Short Term Objective (s) - How will we reach this goal?:   A. Provider new recommended medication/dosage changes and/or continue medication(s): continue current medications as prescribed. B.  N/A. Target Date: 6 months - 6/13/2024  Person/Persons Responsible for Completion of Goal: Aaron  Progress Towards Goals: stable, continuing treatment  Treatment Modality: medication education at every visit  Review due 6 months from date of this plan: 6 months - 6/13/2024  Expected length of service: ongoing treatment unless revised  My Physician/PA/NP and I have developed this plan together and I agree to work on the goals and objectives. I understand the treatment goals that were developed for my treatment.

## 2023-12-13 NOTE — PSYCH
PROGRESS NOTE        [facility]      Name and Date of Birth:  Flaquita Truong. 77 y.o. 1945    Date of Visit: 12/13/23    SUBJECTIVE: Patient is a 23-year-old male who has a history of depressive disorder and anxiety disorder. He tells me everything is going well for him right now. His health is good. His wife's health is good but she does have some trouble ambulating. Her 80year-old mother is alive and doing fairly well living at [facility]. Patient states his daughter is  and doing well and his son also  with children and is doing well. He offers no other new clinical issues or concerns. He is sleeping okay and is eating okay without issue. Most times the couple is eating out for the ease of it. He will continue on alprazolam and duloxetine and follow-up with me in 6 months or sooner if necessary. He denies suicidal ideation, intent or plan at present, has no suicidal ideation, intent or plan at present. He denies any auditory hallucinations and visual hallucinations, denies any other delusional thinking, denies any delusional thinking. He denies any side effects from medications  . HPI ROS Appetite Changes and Sleep: normal appetite, normal sleep    Review Of Systems:      Constitutional Negative   ENT Negative   Cardiovascular Negative   Respiratory As Noted in HPI   Gastrointestinal Negative   Genitourinary Negative   Musculoskeletal Negative   Integumentary Negative   Neurological Negative   Endocrine Negative   Other Symptoms Negative and None       Laboratory Results: No results found for this or any previous visit.     Substance Abuse History:    Social History     Substance and Sexual Activity   Drug Use No       Family Psychiatric History:     Family History   Problem Relation Age of Onset    Stroke Mother     Prostate cancer Father        The following portions of the patient's history were reviewed and updated as appropriate: past family history, past medical history, past social history, past surgical history and problem list.    Social History     Socioeconomic History    Marital status: /Civil Union     Spouse name: Not on file    Number of children: Not on file    Years of education: Not on file    Highest education level: Not on file   Occupational History    Not on file   Tobacco Use    Smoking status: Former     Current packs/day: 0.00     Average packs/day: 1 pack/day for 24.0 years (24.0 ttl pk-yrs)     Types: Cigarettes     Start date: 1959     Quit date: 1983     Years since quittin.6    Smokeless tobacco: Never   Vaping Use    Vaping status: Never Used   Substance and Sexual Activity    Alcohol use: No    Drug use: No    Sexual activity: Never   Other Topics Concern    Not on file   Social History Narrative    Not on file     Social Determinants of Health     Financial Resource Strain: Not on file   Food Insecurity: Not on file   Transportation Needs: Not on file   Physical Activity: Not on file   Stress: Not on file   Social Connections: Not on file   Intimate Partner Violence: Not on file   Housing Stability: Not on file     Social History     Social History Narrative    Not on file        Social History       Tobacco History       Smoking Status  Former Smoking Start Date  1959 Quit Date  1983 Average Packs/Day  1 pack/day for 24.0 years (24.0 ttl pk-yrs) Smoking Tobacco Type  Cigarettes from 1959 to 1983   Pack Year History     Packs/Day From To Years    0 1983  40.6    1 1959 24.0      Smokeless Tobacco Use  Never              Alcohol History       Alcohol Use Status  No              Drug Use       Drug Use Status  No              Sexual Activity       Sexually Active  Never              Activities of Daily Living    Not Asked                       OBJECTIVE:     Mental Status Evaluation:    Appearance age appropriate, casually dressed   Behavior pleasant, cooperative   Speech normal volume, normal pitch   Mood Stable mood   Affect Bright affect   Thought Processes logical   Associations intact associations   Thought Content No overt delusions   Perceptual Disturbances: none   Abnormal Thoughts  Risk Potential Suicidal ideation - None  Homicidal ideation - None  Potential for aggression - No   Orientation oriented to person, place, time/date and situation   Memory recent and remote memory grossly intact   Cosciousness alert and awake   Attention Span attention span and concentration are age appropriate   Intellect Appears to be of Average Intelligence   Insight Good   Judgement Good   Muscle Strength and  Gait muscle strength and tone were normal   Language no difficulty naming common objects   Fund of Knowledge displays adequate knowledge of current events   Pain none   Pain Scale 0       Assessment/Plan:       There are no diagnoses linked to this encounter. Treatment Recommendations/Precautions:    Continue current medications:  Xanax 0.25 mg at bedtime  Cymbalta 60 mg daily  Follow-up with me in 6 months or sooner if necessary. Risks/Benefits      Risks, Benefits And Possible Side Effects Of Medications:    Risks, benefits, and possible side effects of medications explained to patient and patient verbalizes understanding and agreement for treatment. Controlled Medication Discussion: Fills all prescriptions on time. No evidence of any overuse or abuse of any controlled substance. No evidence of lethargy or sedation. Uses Xanax at night only. Helps him to sleep through the night without issue. Psychotherapy Provided:     Individual psychotherapy provided: No.  The visit started at 10 AM and ended at 10:30 AM.  Time was spent reviewing the treatment plan, catching up with the patient on how he has been doing over the last 6 months and completing the progress note.

## 2023-12-14 DIAGNOSIS — F41.1 GAD (GENERALIZED ANXIETY DISORDER): ICD-10-CM

## 2023-12-14 RX ORDER — ALPRAZOLAM 0.25 MG/1
TABLET ORAL
Qty: 90 TABLET | Refills: 0 | Status: SHIPPED | OUTPATIENT
Start: 2023-12-14

## 2024-02-13 DIAGNOSIS — I10 ESSENTIAL HYPERTENSION: ICD-10-CM

## 2024-02-13 RX ORDER — LOSARTAN POTASSIUM AND HYDROCHLOROTHIAZIDE 12.5; 5 MG/1; MG/1
TABLET ORAL
Qty: 90 TABLET | Refills: 6 | Status: SHIPPED | OUTPATIENT
Start: 2024-02-13

## 2024-02-26 DIAGNOSIS — F33.40 RECURRENT MAJOR DEPRESSIVE DISORDER, IN REMISSION (HCC): ICD-10-CM

## 2024-02-26 RX ORDER — DULOXETIN HYDROCHLORIDE 60 MG/1
60 CAPSULE, DELAYED RELEASE ORAL DAILY
Qty: 90 CAPSULE | Refills: 3 | Status: SHIPPED | OUTPATIENT
Start: 2024-02-26

## 2024-03-06 ENCOUNTER — APPOINTMENT (OUTPATIENT)
Dept: LAB | Facility: CLINIC | Age: 79
End: 2024-03-06
Payer: MEDICARE

## 2024-03-06 DIAGNOSIS — R97.20 ELEVATED PROSTATE SPECIFIC ANTIGEN (PSA): ICD-10-CM

## 2024-03-06 LAB
BUN SERPL-MCNC: 22 MG/DL (ref 5–25)
CREAT SERPL-MCNC: 1.25 MG/DL (ref 0.6–1.3)
GFR SERPL CREATININE-BSD FRML MDRD: 54 ML/MIN/1.73SQ M

## 2024-03-06 PROCEDURE — 84520 ASSAY OF UREA NITROGEN: CPT

## 2024-03-06 PROCEDURE — 82565 ASSAY OF CREATININE: CPT

## 2024-03-06 PROCEDURE — 36415 COLL VENOUS BLD VENIPUNCTURE: CPT

## 2024-03-11 DIAGNOSIS — F41.1 GAD (GENERALIZED ANXIETY DISORDER): ICD-10-CM

## 2024-03-11 RX ORDER — ALPRAZOLAM 0.25 MG/1
TABLET ORAL
Qty: 90 TABLET | Refills: 1 | Status: SHIPPED | OUTPATIENT
Start: 2024-03-11

## 2024-03-26 ENCOUNTER — HOSPITAL ENCOUNTER (OUTPATIENT)
Dept: MRI IMAGING | Facility: HOSPITAL | Age: 79
Discharge: HOME | End: 2024-03-26
Payer: MEDICARE

## 2024-03-26 DIAGNOSIS — R97.20 ELEVATED PROSTATE SPECIFIC ANTIGEN (PSA): ICD-10-CM

## 2024-03-26 PROCEDURE — A9585 GADOBUTROL INJECTION: HCPCS | Performed by: SPECIALIST

## 2024-03-26 PROCEDURE — 72197 MRI PELVIS W/O & W/DYE: CPT

## 2024-03-26 PROCEDURE — 76377 3D RENDER W/INTRP POSTPROCES: CPT

## 2024-03-26 RX ORDER — GADOBUTROL 604.72 MG/ML
9 INJECTION INTRAVENOUS
Status: COMPLETED | OUTPATIENT
Start: 2024-03-26 | End: 2024-03-26

## 2024-03-26 RX ADMIN — GADOBUTROL 9 ML: 604.72 INJECTION INTRAVENOUS at 10:28

## 2024-05-14 DIAGNOSIS — I10 PRIMARY HYPERTENSION: ICD-10-CM

## 2024-05-14 DIAGNOSIS — R42 POSTURAL DIZZINESS WITH PRESYNCOPE: ICD-10-CM

## 2024-05-14 DIAGNOSIS — R55 POSTURAL DIZZINESS WITH PRESYNCOPE: ICD-10-CM

## 2024-05-15 ENCOUNTER — TELEPHONE (OUTPATIENT)
Dept: CARDIOLOGY CLINIC | Facility: CLINIC | Age: 79
End: 2024-05-15

## 2024-05-15 RX ORDER — AMLODIPINE BESYLATE 5 MG/1
5 TABLET ORAL DAILY
Qty: 90 TABLET | Refills: 3 | Status: SHIPPED | OUTPATIENT
Start: 2024-05-15

## 2024-05-15 NOTE — TELEPHONE ENCOUNTER
Refill must be reviewed and completed by the office or provider. The refill is unable to be approved or denied by the medication management team.      Last seen 05.2023, no appointment - Please review to order if the refill is appropriate.

## 2024-05-15 NOTE — TELEPHONE ENCOUNTER
Tried to siva patient to schedule overdue follow-up.  There is no answering machine to leave a message and there was no answer

## 2024-05-17 ENCOUNTER — HOSPITAL ENCOUNTER (OUTPATIENT)
Dept: NON INVASIVE DIAGNOSTICS | Facility: CLINIC | Age: 79
Discharge: HOME | End: 2024-05-17
Payer: MEDICARE

## 2024-05-17 DIAGNOSIS — I65.23 BILATERAL CAROTID ARTERY STENOSIS: ICD-10-CM

## 2024-05-17 DIAGNOSIS — I65.23 ATHEROSCLEROSIS OF BOTH CAROTID ARTERIES: ICD-10-CM

## 2024-05-17 DIAGNOSIS — I72.4 ANEURYSM OF POPLITEAL ARTERY (HCC): ICD-10-CM

## 2024-05-17 DIAGNOSIS — I73.9 PAD (PERIPHERAL ARTERY DISEASE) (HCC): ICD-10-CM

## 2024-05-17 DIAGNOSIS — I71.40 ABDOMINAL AORTIC ANEURYSM (AAA) WITHOUT RUPTURE, UNSPECIFIED PART (HCC): ICD-10-CM

## 2024-05-17 DIAGNOSIS — I71.20 THORACIC AORTIC ANEURYSM WITHOUT RUPTURE, UNSPECIFIED PART (HCC): ICD-10-CM

## 2024-05-17 PROCEDURE — 93923 UPR/LXTR ART STDY 3+ LVLS: CPT

## 2024-05-17 PROCEDURE — 93925 LOWER EXTREMITY STUDY: CPT

## 2024-05-17 PROCEDURE — 93923 UPR/LXTR ART STDY 3+ LVLS: CPT | Performed by: SURGERY

## 2024-05-17 PROCEDURE — 93880 EXTRACRANIAL BILAT STUDY: CPT | Performed by: SURGERY

## 2024-05-17 PROCEDURE — 93978 VASCULAR STUDY: CPT | Performed by: SURGERY

## 2024-05-17 PROCEDURE — 93978 VASCULAR STUDY: CPT

## 2024-05-17 PROCEDURE — 93880 EXTRACRANIAL BILAT STUDY: CPT

## 2024-05-17 PROCEDURE — 93925 LOWER EXTREMITY STUDY: CPT | Performed by: SURGERY

## 2024-05-23 ENCOUNTER — OFFICE VISIT (OUTPATIENT)
Dept: VASCULAR SURGERY | Facility: CLINIC | Age: 79
End: 2024-05-23
Payer: MEDICARE

## 2024-05-23 VITALS
DIASTOLIC BLOOD PRESSURE: 62 MMHG | WEIGHT: 195 LBS | HEIGHT: 71 IN | HEART RATE: 70 BPM | RESPIRATION RATE: 18 BRPM | BODY MASS INDEX: 27.3 KG/M2 | SYSTOLIC BLOOD PRESSURE: 112 MMHG | OXYGEN SATURATION: 95 %

## 2024-05-23 DIAGNOSIS — I71.40 ABDOMINAL AORTIC ANEURYSM (AAA) WITHOUT RUPTURE, UNSPECIFIED PART (HCC): Primary | ICD-10-CM

## 2024-05-23 DIAGNOSIS — I71.019 DISSECTION OF THORACIC AORTA, UNSPECIFIED PART (HCC): ICD-10-CM

## 2024-05-23 DIAGNOSIS — N18.31 STAGE 3A CHRONIC KIDNEY DISEASE (HCC): ICD-10-CM

## 2024-05-23 DIAGNOSIS — I72.4 ANEURYSM OF POPLITEAL ARTERY (HCC): ICD-10-CM

## 2024-05-23 DIAGNOSIS — I65.23 BILATERAL CAROTID ARTERY STENOSIS: ICD-10-CM

## 2024-05-23 DIAGNOSIS — I73.9 PAD (PERIPHERAL ARTERY DISEASE) (HCC): ICD-10-CM

## 2024-05-23 PROCEDURE — 99214 OFFICE O/P EST MOD 30 MIN: CPT | Performed by: NURSE PRACTITIONER

## 2024-05-23 NOTE — PROGRESS NOTES
Ambulatory Visit  Name: Aaron Alvarez Jr.      : 1945      MRN: [id number]  Encounter Provider: CATY Perkins  Encounter Date: 2024   Encounter department: [facility] [place]    Assessment & Plan   1. Abdominal aortic aneurysm (AAA) without rupture, unspecified part (HCC)  -     VAS ARTERIAL DUPLEX- LOWER LIMB BILATERAL; Future; Expected date: 2025  -     VAS abdominal aorta/iliac duplex; Future; Expected date: 2024  2. Aneurysm of popliteal artery (HCC)  -     VAS ARTERIAL DUPLEX- LOWER LIMB BILATERAL; Future; Expected date: 2025  -     VAS abdominal aorta/iliac duplex; Future; Expected date: 2024  3. PAD (peripheral artery disease) (HCC)  -     VAS ARTERIAL DUPLEX- LOWER LIMB BILATERAL; Future; Expected date: 2025  -     VAS abdominal aorta/iliac duplex; Future; Expected date: 2024  4. Bilateral carotid artery stenosis  -     VAS carotid complete study; Future; Expected date: 2025  5. Stage 3a chronic kidney disease (HCC)  6. Dissection of thoracic aorta, unspecified part (HCC)      History of Present Illness   Patient had a CV, AOIL, and GERMAN on 24. Pt denies TIA or CVA symptoms. Pt denies change in appetite, abd pain, or back pain. Pt denies claudication, rest pain, or non-healing wounds.       Review of Systems   Constitutional: Negative.    HENT: Negative.     Eyes: Negative.    Respiratory: Negative.     Cardiovascular: Negative.    Gastrointestinal: Negative.    Endocrine: Negative.    Genitourinary:  Positive for urgency.   Musculoskeletal: Negative.    Skin: Negative.    Allergic/Immunologic: Negative.    Neurological: Negative.         Off balance    Hematological: Negative.    Psychiatric/Behavioral: Negative.       Medical History Reviewed by provider this encounter:     .  Objective   I have reviewed and made appropriate changes to the review of systems input by the medical assistant.    Vitals:    24 0827  "24 0831   BP: 112/62 112/62   BP Location: Right arm Left arm   Patient Position: Sitting Sitting   Cuff Size: Standard Standard   Pulse: 70    Resp: 18    SpO2: 95%    Weight: 88.5 kg (195 lb)    Height: 5' 11\" (1.803 m)        Problem List[1]    Past Surgical History[2]    Family History[3]    Social History     Socioeconomic History    Marital status: /Civil Union     Spouse name: Not on file    Number of children: Not on file    Years of education: Not on file    Highest education level: Not on file   Occupational History    Not on file   Tobacco Use    Smoking status: Former     Current packs/day: 0.00     Average packs/day: 1 pack/day for 24.0 years (24.0 ttl pk-yrs)     Types: Cigarettes     Start date: 1959     Quit date: 1983     Years since quittin.2    Smokeless tobacco: Never   Vaping Use    Vaping status: Never Used   Substance and Sexual Activity    Alcohol use: No    Drug use: No    Sexual activity: Never   Other Topics Concern    Not on file   Social History Narrative    Not on file     Social Drivers of Health     Financial Resource Strain: Not on file   Food Insecurity: Not on file   Transportation Needs: Not on file   Physical Activity: Not on file   Stress: Not on file   Social Connections: Not on file   Intimate Partner Violence: Not on file   Housing Stability: Not on file       Allergies[4]    Current Medications[5]    /62 (BP Location: Left arm, Patient Position: Sitting, Cuff Size: Standard)   Pulse 70   Resp 18   Ht 5' 11\" (1.803 m)   Wt 88.5 kg (195 lb)   SpO2 95%   BMI 27.20 kg/m²     Physical Exam  Vitals and nursing note reviewed.   Constitutional:       General: He is not in acute distress.     Appearance: He is well-developed.   HENT:      Head: Normocephalic and atraumatic.     Eyes:      Conjunctiva/sclera: Conjunctivae normal.       Cardiovascular:      Rate and Rhythm: Normal rate and regular rhythm.      Heart sounds: No murmur " heard.  Pulmonary:      Effort: Pulmonary effort is normal. No respiratory distress.      Breath sounds: Normal breath sounds.   Abdominal:      Palpations: Abdomen is soft.      Tenderness: There is no abdominal tenderness.     Musculoskeletal:         General: No swelling.      Cervical back: Neck supple.     Skin:     General: Skin is warm and dry.      Capillary Refill: Capillary refill takes less than 2 seconds.     Neurological:      Mental Status: He is alert.     Psychiatric:         Mood and Affect: Mood normal.                  [1]   Patient Active Problem List  Diagnosis    Recurrent major depression in full remission (HCC)    Renal cyst, acquired    Obstructive sleep apnea (adult) (pediatric)    Aneurysm of abdominal aorta (HCC)    Carotid atherosclerosis    Hypertension    Aneurysm of popliteal artery (HCC)    Hyperlipidemia    Thoracic aortic aneurysm without rupture (HCC)    Bilateral carotid artery stenosis    PAD (peripheral artery disease) ([facility])    Hx of adenomatous colonic polyps    Gastroesophageal reflux    Change in bowel habits    Overweight    Brain injury (HCC)    COPD (chronic obstructive pulmonary disease) (HCC)    Anxiety    Internal hemorrhoids    Diverticulosis    New onset of headaches after age 50    Presyncope    Other specified disorders of arteries and arterioles ([facility])     Migraine without aura and without status migrainosus, not intractable    Bradycardia    Aortic dissection (HCC)    Coronary artery calcification    Stage 3a chronic kidney disease (HCC)   [2]   Past Surgical History:  Procedure Laterality Date    ABDOMINAL AORTIC ANEURYSM REPAIR      about 20 years ago    CELIAC ARTERY STENT      COLONOSCOPY      KIDNEY STONE SURGERY     [3]   Family History  Problem Relation Name Age of Onset    Stroke Mother      Prostate cancer Father     [4]   Allergies  Allergen Reactions    Lorazepam Hyperactivity   [5]   Current Outpatient Medications:     acetaminophen (TYLENOL) 325 mg  tablet, Take 3 tablets (975 mg total) by mouth every 8 (eight) hours as needed for mild pain, Disp: , Rfl: 0    aspirin 81 MG tablet, Take 1 tablet by mouth daily  , Disp: , Rfl:     atorvastatin (LIPITOR) 40 mg tablet, Take 40 mg by mouth daily, Disp: , Rfl: 0    Cholecalciferol (Vitamin D3) 25 MCG (1000 UT) CAPS, Take 1,000 Units by mouth daily, Disp: , Rfl:     ALPRAZolam (XANAX) 0.25 mg tablet, take 1 tablet by mouth at bedtime for sleep, Disp: 90 tablet, Rfl: 1    amLODIPine (NORVASC) 5 mg tablet, take 1 tablet by mouth once daily, Disp: 90 tablet, Rfl: 3    Blood Pressure Monitor KIT, Use in the morning, Disp: 1 kit, Rfl: 0    DULoxetine (CYMBALTA) 60 mg delayed release capsule, take 1 capsule by mouth once daily, Disp: 90 capsule, Rfl: 0    losartan-hydrochlorothiazide (HYZAAR) 50-12.5 mg per tablet, take 1 tablet by mouth once daily, Disp: 90 tablet, Rfl: 6

## 2024-06-10 PROBLEM — N18.31 STAGE 3A CHRONIC KIDNEY DISEASE (HCC): Status: ACTIVE | Noted: 2024-06-10

## 2024-06-13 ENCOUNTER — OFFICE VISIT (OUTPATIENT)
Dept: PSYCHIATRY | Facility: CLINIC | Age: 79
End: 2024-06-13

## 2024-06-13 DIAGNOSIS — F41.1 GAD (GENERALIZED ANXIETY DISORDER): ICD-10-CM

## 2024-06-13 NOTE — PSYCH
PROGRESS NOTE        [facility]      Name and Date of Birth:  Aaron Alvarez Jr. 79 y.o. 1945    Date of Visit: 06/13/24    SUBJECTIVE: Patient is an 79-year-old male who has a history of major depressive disorder and anxiety disorder.  Well maintained on Cymbalta and Xanax for many years.  His mood is stable.  He is sleeping well and eating well.  He and his wife have a cruise planned leaving this coming Sunday.  He is very excited to go on the cruise which will be attended by other family members.  Offering no new complaints or concerns.  Recently, he did have some concerns over his son who had been hospitalized but who is now doing very well.  He will continue his current medication regimen and follow-up with me will be in 6 months or sooner if necessary.        He denies suicidal ideation, intent or plan at present, has no suicidal ideation, intent or plan at present.    He denies any auditory hallucinations and visual hallucinations, denies any other delusional thinking, denies any delusional thinking.    He denies any side effects from medications  .  HPI ROS Appetite Changes and Sleep: normal appetite, normal sleep    Review Of Systems:      Constitutional Negative   ENT Negative   Cardiovascular Negative   Respiratory As Noted in HPI   Gastrointestinal Negative   Genitourinary Negative   Musculoskeletal Negative   Integumentary Negative   Neurological Negative   Endocrine Negative   Other Symptoms Negative and None       Laboratory Results: No results found for this or any previous visit.    Substance Abuse History:    Social History     Substance and Sexual Activity   Drug Use No       Family Psychiatric History:     Family History   Problem Relation Age of Onset    Stroke Mother     Prostate cancer Father        The following portions of the patient's history were reviewed and updated as appropriate: past family history, past medical history,  past social history, past surgical history and problem list.    Social History     Socioeconomic History    Marital status: /Civil Union     Spouse name: Not on file    Number of children: Not on file    Years of education: Not on file    Highest education level: Not on file   Occupational History    Not on file   Tobacco Use    Smoking status: Former     Current packs/day: 0.00     Average packs/day: 1 pack/day for 24.0 years (24.0 ttl pk-yrs)     Types: Cigarettes     Start date: 1959     Quit date: 1983     Years since quittin.1    Smokeless tobacco: Never   Vaping Use    Vaping status: Never Used   Substance and Sexual Activity    Alcohol use: No    Drug use: No    Sexual activity: Never   Other Topics Concern    Not on file   Social History Narrative    Not on file     Social Determinants of Health     Financial Resource Strain: Not on file   Food Insecurity: Not on file   Transportation Needs: Not on file   Physical Activity: Not on file   Stress: Not on file   Social Connections: Not on file   Intimate Partner Violence: Not on file   Housing Stability: Not on file     Social History     Social History Narrative    Not on file        Social History       Tobacco History       Smoking Status  Former Smoking Start Date  1959 Quit Date  1983 Average Packs/Day  1 pack/day for 24.0 years (24.0 ttl pk-yrs) Smoking Tobacco Type  Cigarettes from 1959 to 1983   Pack Year History     Packs/Day From To Years    0 1983  41.1    1 1959 24.0      Smokeless Tobacco Use  Never              Alcohol History       Alcohol Use Status  No              Drug Use       Drug Use Status  No              Sexual Activity       Sexually Active  Never              Activities of Daily Living    Not Asked                       OBJECTIVE:     Mental Status Evaluation:    Appearance age appropriate, casually dressed   Behavior pleasant, cooperative   Speech normal volume, normal  pitch   Mood Stable mood   Affect Bright affect   Thought Processes logical   Associations intact associations   Thought Content No overt delusions   Perceptual Disturbances: none   Abnormal Thoughts  Risk Potential Suicidal ideation - None  Homicidal ideation - None  Potential for aggression - No   Orientation oriented to person, place, time/date and situation   Memory recent and remote memory grossly intact   Cosciousness alert and awake   Attention Span attention span and concentration are age appropriate   Intellect Appears to be of Average Intelligence   Insight Good   Judgement Good   Muscle Strength and  Gait muscle strength and tone were normal   Language no difficulty naming common objects   Fund of Knowledge displays adequate knowledge of current events   Pain none   Pain Scale 0       Assessment/Plan:       Diagnoses and all orders for this visit:    DAVIE (generalized anxiety disorder)          Treatment Recommendations/Precautions:    Continue current medications:  Cymbalta 90 mg daily  Xanax 0.25 mg may take at bedtime as needed to help with sleep  Follow-up with me in 6 months or sooner if necessary.      Safety plan: Patient is aware of the 24-hour on-call service offered by [facility].  Patient is also aware of the 24-hour crisis call service offered by [facility].  Patient has a very good support system with his wife and family.  The patient agrees to call 911 or go directly to the emergency room if she begins to experience any suicidal ideation.     Risks/Benefits      Risks, Benefits And Possible Side Effects Of Medications:    Risks, benefits, and possible side effects of medications explained to patient and patient verbalizes understanding and agreement for treatment.    Controlled Medication Discussion: Fills all prescriptions on time.  No evidence of sedation or lethargy.  Takes all medications as prescribed.      Psychotherapy Provided:     Individual psychotherapy provided: No.  The  visit started at 10 AM and ended at 10:25 AM.  Time was spent reviewing the treatment plan, reviewing medications, ordering medications and completing the progress note.

## 2024-06-13 NOTE — PSYCH
"TREATMENT PLAN (Medication Management Only)        [facility]    Name and Date of Birth:  Aaron Alvarez Jr. 79 y.o. 1945  Date of Treatment Plan: June 13, 2024  Diagnosis/Diagnoses:    1. DAVIE (generalized anxiety disorder)      Strengths/Personal Resources for Self-Care: supportive family, supportive friends.  Area/Areas of need (in own words): \" I am doing well.  I am feeling pretty good.  Going on a cruise Sunday.\".  1. Long Term Goal: \" To continue to do well, and function at my best.\".   Target Date: 6 months - 12/13/2024  Person/Persons responsible for completion of goal: Aaron  2.  Short Term Objective (s) - How will we reach this goal?:   A.  Provider new recommended medication/dosage changes and/or continue medication(s): continue current medications as prescribed.  B.  N/A.    Target Date: 6 months - 12/13/2024  Person/Persons Responsible for Completion of Goal: Aaron  Progress Towards Goals: stable, continuing treatment  Treatment Modality: medication education at every visit  Review due 6 months from date of this plan: 6 months - 12/13/2024  Expected length of service: ongoing treatment unless revised  My Physician/PA/NP and I have developed this plan together and I agree to work on the goals and objectives. I understand the treatment goals that were developed for my treatment.  "

## 2024-06-28 ENCOUNTER — APPOINTMENT (EMERGENCY)
Dept: RADIOLOGY | Facility: HOSPITAL | Age: 79
End: 2024-06-28
Payer: MEDICARE

## 2024-06-28 ENCOUNTER — HOSPITAL ENCOUNTER (EMERGENCY)
Facility: HOSPITAL | Age: 79
Discharge: HOME | End: 2024-06-28
Attending: EMERGENCY MEDICINE
Payer: MEDICARE

## 2024-06-28 VITALS
RESPIRATION RATE: 18 BRPM | HEART RATE: 76 BPM | TEMPERATURE: 98.3 F | OXYGEN SATURATION: 98 % | SYSTOLIC BLOOD PRESSURE: 121 MMHG | DIASTOLIC BLOOD PRESSURE: 55 MMHG

## 2024-06-28 DIAGNOSIS — N17.9 AKI (ACUTE KIDNEY INJURY) (HCC): Primary | ICD-10-CM

## 2024-06-28 DIAGNOSIS — R05.9 COUGH: ICD-10-CM

## 2024-06-28 LAB
ALBUMIN SERPL BCG-MCNC: 3.8 G/DL (ref 3.5–5)
ALP SERPL-CCNC: 69 U/L (ref 34–104)
ALT SERPL W P-5'-P-CCNC: 9 U/L (ref 7–52)
ANION GAP SERPL CALCULATED.3IONS-SCNC: 7 MMOL/L (ref 4–13)
AST SERPL W P-5'-P-CCNC: 19 U/L (ref 13–39)
BASOPHILS # BLD AUTO: 0.09 THOUSANDS/ÂΜL (ref 0–0.1)
BASOPHILS NFR BLD AUTO: 1 % (ref 0–1)
BILIRUB SERPL-MCNC: 1.17 MG/DL (ref 0.2–1)
BUN SERPL-MCNC: 34 MG/DL (ref 5–25)
CALCIUM SERPL-MCNC: 9.5 MG/DL (ref 8.4–10.2)
CHLORIDE SERPL-SCNC: 99 MMOL/L (ref 96–108)
CO2 SERPL-SCNC: 31 MMOL/L (ref 21–32)
CREAT SERPL-MCNC: 1.5 MG/DL (ref 0.6–1.3)
EOSINOPHIL # BLD AUTO: 0.18 THOUSAND/ÂΜL (ref 0–0.61)
EOSINOPHIL NFR BLD AUTO: 2 % (ref 0–6)
ERYTHROCYTE [DISTWIDTH] IN BLOOD BY AUTOMATED COUNT: 12.5 % (ref 11.6–15.1)
FLUAV RNA RESP QL NAA+PROBE: NEGATIVE
FLUBV RNA RESP QL NAA+PROBE: NEGATIVE
GFR SERPL CREATININE-BSD FRML MDRD: 43 ML/MIN/1.73SQ M
GLUCOSE SERPL-MCNC: 105 MG/DL (ref 65–140)
HCT VFR BLD AUTO: 38.4 % (ref 36.5–49.3)
HGB BLD-MCNC: 13.4 G/DL (ref 12–17)
IMM GRANULOCYTES # BLD AUTO: 0.04 THOUSAND/UL (ref 0–0.2)
IMM GRANULOCYTES NFR BLD AUTO: 0 % (ref 0–2)
LYMPHOCYTES # BLD AUTO: 1.14 THOUSANDS/ÂΜL (ref 0.6–4.47)
LYMPHOCYTES NFR BLD AUTO: 11 % (ref 14–44)
MCH RBC QN AUTO: 33.2 PG (ref 26.8–34.3)
MCHC RBC AUTO-ENTMCNC: 34.9 G/DL (ref 31.4–37.4)
MCV RBC AUTO: 95 FL (ref 82–98)
MONOCYTES # BLD AUTO: 0.72 THOUSAND/ÂΜL (ref 0.17–1.22)
MONOCYTES NFR BLD AUTO: 7 % (ref 4–12)
NEUTROPHILS # BLD AUTO: 8.07 THOUSANDS/ÂΜL (ref 1.85–7.62)
NEUTS SEG NFR BLD AUTO: 79 % (ref 43–75)
NRBC BLD AUTO-RTO: 0 /100 WBCS
PLATELET # BLD AUTO: 240 THOUSANDS/UL (ref 149–390)
PMV BLD AUTO: 9.1 FL (ref 8.9–12.7)
POTASSIUM SERPL-SCNC: 3.2 MMOL/L (ref 3.5–5.3)
PROT SERPL-MCNC: 6.9 G/DL (ref 6.4–8.4)
RBC # BLD AUTO: 4.04 MILLION/UL (ref 3.88–5.62)
RSV RNA RESP QL NAA+PROBE: NEGATIVE
SARS-COV-2 RNA RESP QL NAA+PROBE: NEGATIVE
SODIUM SERPL-SCNC: 137 MMOL/L (ref 135–147)
WBC # BLD AUTO: 10.24 THOUSAND/UL (ref 4.31–10.16)

## 2024-06-28 PROCEDURE — 85025 COMPLETE CBC W/AUTO DIFF WBC: CPT | Performed by: EMERGENCY MEDICINE

## 2024-06-28 PROCEDURE — 36415 COLL VENOUS BLD VENIPUNCTURE: CPT | Performed by: EMERGENCY MEDICINE

## 2024-06-28 PROCEDURE — 96360 HYDRATION IV INFUSION INIT: CPT

## 2024-06-28 PROCEDURE — 99283 EMERGENCY DEPT VISIT LOW MDM: CPT

## 2024-06-28 PROCEDURE — 80053 COMPREHEN METABOLIC PANEL: CPT | Performed by: EMERGENCY MEDICINE

## 2024-06-28 PROCEDURE — 71045 X-RAY EXAM CHEST 1 VIEW: CPT

## 2024-06-28 PROCEDURE — 99284 EMERGENCY DEPT VISIT MOD MDM: CPT | Performed by: EMERGENCY MEDICINE

## 2024-06-28 PROCEDURE — 0241U HB NFCT DS VIR RESP RNA 4 TRGT: CPT | Performed by: EMERGENCY MEDICINE

## 2024-06-28 RX ORDER — POTASSIUM CHLORIDE 20 MEQ/1
40 TABLET, EXTENDED RELEASE ORAL ONCE
Status: COMPLETED | OUTPATIENT
Start: 2024-06-28 | End: 2024-06-28

## 2024-06-28 RX ORDER — AZITHROMYCIN 250 MG/1
TABLET, FILM COATED ORAL
Qty: 6 TABLET | Refills: 0 | Status: SHIPPED | OUTPATIENT
Start: 2024-06-28 | End: 2024-06-28

## 2024-06-28 RX ORDER — AZITHROMYCIN 250 MG/1
500 TABLET, FILM COATED ORAL ONCE
Status: COMPLETED | OUTPATIENT
Start: 2024-06-28 | End: 2024-06-28

## 2024-06-28 RX ADMIN — SODIUM CHLORIDE 500 ML: 0.9 INJECTION, SOLUTION INTRAVENOUS at 11:50

## 2024-06-28 RX ADMIN — POTASSIUM CHLORIDE 40 MEQ: 1500 TABLET, EXTENDED RELEASE ORAL at 11:51

## 2024-06-28 RX ADMIN — AZITHROMYCIN DIHYDRATE 500 MG: 250 TABLET ORAL at 11:51

## 2024-06-28 NOTE — ED PROVIDER NOTES
History  Chief Complaint   Patient presents with    Cold Like Symptoms     Reports got back from vacation Sunday, cough, headache, runny nose, went Tuesday to PCP and given abx     78 y/o M w PMH as listed presents w nasal congestion, cough, sore throat. Pt has symptoms to his wife.  They both recently came back from a cruise last week. No CP. No SOB. No fevers, chills. No neck pain. No HA.  No palpitations.  No lightheadedness.          Prior to Admission Medications   Prescriptions Last Dose Informant Patient Reported? Taking?   ALPRAZolam (XANAX) 0.25 mg tablet   No No   Sig: take 1 tablet by mouth daily at bedtime if needed for anxiety   Blood Pressure Monitor KIT  Self No No   Sig: Use in the morning   Cholecalciferol (Vitamin D3) 25 MCG (1000 UT) CAPS  Self Yes No   Sig: Take 1,000 Units by mouth daily   DULoxetine (CYMBALTA) 60 mg delayed release capsule   No No   Sig: take 1 capsule by mouth once daily   acetaminophen (TYLENOL) 325 mg tablet  Self No No   Sig: Take 3 tablets (975 mg total) by mouth every 8 (eight) hours as needed for mild pain   amLODIPine (NORVASC) 5 mg tablet   No No   Sig: take 1 tablet by mouth once daily   aspirin 81 MG tablet  Self Yes No   Sig: Take 1 tablet by mouth daily     atorvastatin (LIPITOR) 40 mg tablet  Self Yes No   Sig: Take 40 mg by mouth daily   losartan-hydrochlorothiazide (HYZAAR) 50-12.5 mg per tablet   No No   Sig: take 1 tablet by mouth once daily      Facility-Administered Medications: None       Past Medical History:   Diagnosis Date    Anxiety     Aortic dissection (HCC)     Brain injury (HCC)     Hx of fall on ice in winter 2021- treated at [facility]-still notices occasional sharp pains in head with cough     Colon polyp     COPD (chronic obstructive pulmonary disease) (HCC)     PCP states mild    CPAP (continuous positive airway pressure) dependence     Depression     Hyperlipidemia     Hypertension     Kidney stone     Rectal bleeding     hx of when he was  taking Eliquis    Renal cyst     Renal cyst     Sleep apnea     Sleep apnea, obstructive        Past Surgical History:   Procedure Laterality Date    ABDOMINAL AORTIC ANEURYSM REPAIR      about 20 years ago    CELIAC ARTERY STENT      COLONOSCOPY      KIDNEY STONE SURGERY         Family History   Problem Relation Age of Onset    Stroke Mother     Prostate cancer Father      I have reviewed and agree with the history as documented.    E-Cigarette/Vaping    E-Cigarette Use Never User      E-Cigarette/Vaping Substances    Nicotine No     THC No     CBD No     Flavoring No     Other No     Unknown No      Social History     Tobacco Use    Smoking status: Former     Current packs/day: 0.00     Average packs/day: 1 pack/day for 24.0 years (24.0 ttl pk-yrs)     Types: Cigarettes     Start date: 1959     Quit date: 1983     Years since quittin.2    Smokeless tobacco: Never   Vaping Use    Vaping status: Never Used   Substance Use Topics    Alcohol use: No    Drug use: No       Review of Systems   Constitutional:  Negative for activity change, chills and fever.   HENT:  Negative for ear pain and sore throat.    Eyes:  Negative for pain, discharge and visual disturbance.   Respiratory:  Negative for cough and shortness of breath.    Cardiovascular:  Negative for chest pain and palpitations.   Gastrointestinal:  Negative for abdominal pain and vomiting.   Endocrine: Negative for cold intolerance, heat intolerance and polydipsia.   Genitourinary:  Negative for dysuria and hematuria.   Musculoskeletal:  Negative for arthralgias and back pain.   Skin:  Negative for color change and rash.   Allergic/Immunologic: Negative for environmental allergies.   Neurological:  Negative for dizziness, seizures and syncope.   Hematological:  Negative for adenopathy.   Psychiatric/Behavioral:  Negative for agitation, behavioral problems, confusion and self-injury. The patient is not hyperactive.    All other systems reviewed and  are negative.      Physical Exam  Physical Exam  Constitutional:       Appearance: Normal appearance.   HENT:      Head: Normocephalic.      Nose: Nose normal. No congestion.      Mouth/Throat:      Mouth: Mucous membranes are moist.   Eyes:      Extraocular Movements: Extraocular movements intact.      Conjunctiva/sclera: Conjunctivae normal.      Pupils: Pupils are equal, round, and reactive to light.   Cardiovascular:      Rate and Rhythm: Normal rate.      Pulses: Normal pulses.      Heart sounds: Normal heart sounds. No murmur heard.     No gallop.   Pulmonary:      Effort: Pulmonary effort is normal.      Breath sounds: Normal breath sounds.   Abdominal:      Palpations: Abdomen is soft.   Musculoskeletal:         General: No swelling, tenderness or deformity. Normal range of motion.   Skin:     General: Skin is warm.      Capillary Refill: Capillary refill takes less than 2 seconds.      Findings: No bruising or erythema.   Neurological:      General: No focal deficit present.      Mental Status: He is alert and oriented to person, place, and time.      Cranial Nerves: No cranial nerve deficit.      Motor: No weakness.   Psychiatric:         Mood and Affect: Mood normal.         Vital Signs  ED Triage Vitals [06/28/24 0907]   Temperature Pulse Respirations Blood Pressure SpO2   98.3 °F (36.8 °C) 76 18 121/55 98 %      Temp Source Heart Rate Source Patient Position - Orthostatic VS BP Location FiO2 (%)   Oral Monitor Sitting Right arm --      Pain Score       --           Vitals:    06/28/24 0907   BP: 121/55   Pulse: 76   Patient Position - Orthostatic VS: Sitting         Visual Acuity      ED Medications  Medications   sodium chloride 0.9 % bolus 500 mL (0 mL Intravenous Stopped 6/28/24 1301)   potassium chloride (Klor-Con M20) CR tablet 40 mEq (40 mEq Oral Given 6/28/24 1151)   azithromycin (ZITHROMAX) tablet 500 mg (500 mg Oral Given 6/28/24 1151)       Diagnostic Studies  Results Reviewed       Procedure  Component Value Units Date/Time    COVID/FLU/RSV [[id number]]  (Normal) Collected: 06/28/24 1018    Lab Status: Final result Specimen: Nares from Nose Updated: 06/28/24 1106     SARS-CoV-2 Negative     INFLUENZA A PCR Negative     INFLUENZA B PCR Negative     RSV PCR Negative    Narrative:      FOR PEDIATRIC PATIENTS - copy/paste COVID Guidelines URL to browser: [email or web address]    SARS-CoV-2 assay is a Nucleic Acid Amplification assay intended for the  qualitative detection of nucleic acid from SARS-CoV-2 in nasopharyngeal  swabs. Results are for the presumptive identification of SARS-CoV-2 RNA.    Positive results are indicative of infection with SARS-CoV-2, the virus  causing COVID-19, but do not rule out bacterial infection or co-infection  with other viruses. Laboratories within the [place] and its  territories are required to report all positive results to the appropriate  public health authorities. Negative results do not preclude SARS-CoV-2  infection and should not be used as the sole basis for treatment or other  patient management decisions. Negative results must be combined with  clinical observations, patient history, and epidemiological information.  This test has not been FDA cleared or approved.    This test has been authorized by FDA under an Emergency Use Authorization  (EUA). This test is only authorized for the duration of time the  declaration that circumstances exist justifying the authorization of the  emergency use of an in vitro diagnostic tests for detection of SARS-CoV-2  virus and/or diagnosis of COVID-19 infection under section 564(b)(1) of  the Act, 21 U.S.C. 360bbb-3(b)(1), unless the authorization is terminated  or revoked sooner. The test has been validated but independent review by FDA  and CLIA is pending.    Test performed using [employer]: This RT-PCR assay targets N2,  a region unique to SARS-CoV-2. A  conserved region in the E-gene was chosen  for pan-Sarbecovirus detection which includes SARS-CoV-2.    According to CMS-2020-01-R, this platform meets the definition of high-throughput technology.    Comprehensive metabolic panel [[id number]]  (Abnormal) Collected: 06/28/24 1018    Lab Status: Final result Specimen: Blood from Arm, Left Updated: 06/28/24 1039     Sodium 137 mmol/L      Potassium 3.2 mmol/L      Chloride 99 mmol/L      CO2 31 mmol/L      ANION GAP 7 mmol/L      BUN 34 mg/dL      Creatinine 1.50 mg/dL      Glucose 105 mg/dL      Calcium 9.5 mg/dL      AST 19 U/L      ALT 9 U/L      Alkaline Phosphatase 69 U/L      Total Protein 6.9 g/dL      Albumin 3.8 g/dL      Total Bilirubin 1.17 mg/dL      eGFR 43 ml/min/1.73sq m     Narrative:      National Kidney Disease Foundation guidelines for Chronic Kidney Disease (CKD):     Stage 1 with normal or high GFR (GFR > 90 mL/min/1.73 square meters)    Stage 2 Mild CKD (GFR = 60-89 mL/min/1.73 square meters)    Stage 3A Moderate CKD (GFR = 45-59 mL/min/1.73 square meters)    Stage 3B Moderate CKD (GFR = 30-44 mL/min/1.73 square meters)    Stage 4 Severe CKD (GFR = 15-29 mL/min/1.73 square meters)    Stage 5 End Stage CKD (GFR <15 mL/min/1.73 square meters)  Note: GFR calculation is accurate only with a steady state creatinine    CBC and differential [[id number]]  (Abnormal) Collected: 06/28/24 1018    Lab Status: Final result Specimen: Blood from Arm, Left Updated: 06/28/24 1026     WBC 10.24 Thousand/uL      RBC 4.04 Million/uL      Hemoglobin 13.4 g/dL      Hematocrit 38.4 %      MCV 95 fL      MCH 33.2 pg      MCHC 34.9 g/dL      RDW 12.5 %      MPV 9.1 fL      Platelets 240 Thousands/uL      nRBC 0 /100 WBCs      Segmented % 79 %      Immature Grans % 0 %      Lymphocytes % 11 %      Monocytes % 7 %      Eosinophils Relative 2 %      Basophils Relative 1 %      Absolute Neutrophils 8.07 Thousands/µL      Absolute Immature Grans 0.04 Thousand/uL      Absolute  Lymphocytes 1.14 Thousands/µL      Absolute Monocytes 0.72 Thousand/µL      Eosinophils Absolute 0.18 Thousand/µL      Basophils Absolute 0.09 Thousands/µL                    XR chest 1 view portable    (Results Pending)              Procedures  Procedures         ED Course       78 y/o M presents w viral like symptoms for the past several days after coming back from cruise ship.  Chest x-ray is overall clear.  Patient is currently on azithromycin.  Patient encouraged to increased p.o. hydration given slight JADA.  Patient agreed with the plan verbalized understanding.  Patient discharged home in stable condition with instruction to follow-up with primary care doctor for repeat labs within a week.  Extensive turn precautions provided.                          SBIRT 20yo+      Flowsheet Row Most Recent Value   Initial Alcohol Screen: US AUDIT-C     1. How often do you have a drink containing alcohol? 0 Filed at: 06/28/2024 1007   2. How many drinks containing alcohol do you have on a typical day you are drinking?  0 Filed at: 06/28/2024 1007   3a. Male UNDER 65: How often do you have five or more drinks on one occasion? 0 Filed at: 06/28/2024 1007   3b. FEMALE Any Age, or MALE 65+: How often do you have 4 or more drinks on one occassion? 0 Filed at: 06/28/2024 1007   Audit-C Score 0 Filed at: 06/28/2024 1007   SANDEEP: How many times in the past year have you...    Used an illegal drug or used a prescription medication for non-medical reasons? Never Filed at: 06/28/2024 1007                      Medical Decision Making  78 y/o M presents w viral like symptoms for the past several days after coming back from cruise ship.  Chest x-ray is overall clear.  Patient is currently on azithromycin.  Patient encouraged to increased p.o. hydration given slight JADA.  Patient agreed with the plan verbalized understanding.  Patient discharged home in stable condition with instruction to follow-up with primary care doctor for repeat  labs within a week.  Extensive turn precautions provided.    Amount and/or Complexity of Data Reviewed  External Data Reviewed: labs and radiology.  Labs: ordered.  Radiology: ordered.    Risk  Prescription drug management.             Disposition  Final diagnoses:   JADA (acute kidney injury) (HCC)   Cough     Time reflects when diagnosis was documented in both MDM as applicable and the Disposition within this note       Time User Action Codes Description Comment    6/28/2024  1:06 PM Rohit Paige Add [N17.9] JADA (acute kidney injury) (HCC)     6/28/2024  1:06 PM Rohit Paige Add [R05.9] Cough           ED Disposition       ED Disposition   Discharge    Condition   Stable    Date/Time   Fri Jun 28, 2024 1306    Comment   Aaron Alvarez Jr. discharge to home/self care.                   Follow-up Information       Follow up With Specialties Details Why Contact Info    Bertin Hall MD Family Medicine Schedule an appointment as soon as possible for a visit in 1 day  [address] [place], [place] [id number] [phone]              Patient's Medications   Discharge Prescriptions    AZITHROMYCIN (ZITHROMAX) 250 MG TABLET    Take 2 tablets today then 1 tablet daily x 4 days       Start Date: 6/28/2024 End Date: 7/2/2024       Order Dose: --       Quantity: 6 tablet    Refills: 0       No discharge procedures on file.    PDMP Review         Value Time User    PDMP Reviewed  Yes 3/11/2024 10:41 AM CATY Cooper            ED Provider  Electronically Signed by             Rohit Paige DO  06/28/24 8518

## 2024-06-28 NOTE — DISCHARGE INSTRUCTIONS
Please follow-up with your primary care doctor to repeat the labs in about 1 week.  Your kidney function is slightly off your baseline value which is likely secondary to dehydration.  Please make an appointment as soon as possible with your primary care doctor.  Return to the hospital if you are still not feeling well next week.

## 2024-07-25 ENCOUNTER — APPOINTMENT (OUTPATIENT)
Dept: LAB | Facility: CLINIC | Age: 79
End: 2024-07-25
Payer: MEDICARE

## 2024-07-25 DIAGNOSIS — Z12.5 SPECIAL SCREENING FOR MALIGNANT NEOPLASM OF PROSTATE: ICD-10-CM

## 2024-07-25 DIAGNOSIS — E55.9 AVITAMINOSIS D: ICD-10-CM

## 2024-07-25 DIAGNOSIS — E78.5 HYPERLIPIDEMIA, UNSPECIFIED HYPERLIPIDEMIA TYPE: ICD-10-CM

## 2024-07-25 DIAGNOSIS — R73.09 IMPAIRED GLUCOSE TOLERANCE TEST: ICD-10-CM

## 2024-07-25 LAB
25(OH)D3 SERPL-MCNC: >120 NG/ML (ref 30–100)
ALBUMIN SERPL BCG-MCNC: 4 G/DL (ref 3.5–5)
ALP SERPL-CCNC: 75 U/L (ref 34–104)
ALT SERPL W P-5'-P-CCNC: 12 U/L (ref 7–52)
ANION GAP SERPL CALCULATED.3IONS-SCNC: 5 MMOL/L (ref 4–13)
AST SERPL W P-5'-P-CCNC: 18 U/L (ref 13–39)
BASOPHILS # BLD AUTO: 0.06 THOUSANDS/ÂΜL (ref 0–0.1)
BASOPHILS NFR BLD AUTO: 1 % (ref 0–1)
BILIRUB SERPL-MCNC: 0.85 MG/DL (ref 0.2–1)
BUN SERPL-MCNC: 21 MG/DL (ref 5–25)
CALCIUM SERPL-MCNC: 9.6 MG/DL (ref 8.4–10.2)
CHLORIDE SERPL-SCNC: 103 MMOL/L (ref 96–108)
CHOLEST SERPL-MCNC: 140 MG/DL
CO2 SERPL-SCNC: 32 MMOL/L (ref 21–32)
CREAT SERPL-MCNC: 1.19 MG/DL (ref 0.6–1.3)
EOSINOPHIL # BLD AUTO: 0.2 THOUSAND/ÂΜL (ref 0–0.61)
EOSINOPHIL NFR BLD AUTO: 3 % (ref 0–6)
ERYTHROCYTE [DISTWIDTH] IN BLOOD BY AUTOMATED COUNT: 12.5 % (ref 11.6–15.1)
EST. AVERAGE GLUCOSE BLD GHB EST-MCNC: 111 MG/DL
GFR SERPL CREATININE-BSD FRML MDRD: 57 ML/MIN/1.73SQ M
GLUCOSE P FAST SERPL-MCNC: 100 MG/DL (ref 65–99)
HBA1C MFR BLD: 5.5 %
HCT VFR BLD AUTO: 40.1 % (ref 36.5–49.3)
HDLC SERPL-MCNC: 52 MG/DL
HGB BLD-MCNC: 13.5 G/DL (ref 12–17)
IMM GRANULOCYTES # BLD AUTO: 0.02 THOUSAND/UL (ref 0–0.2)
IMM GRANULOCYTES NFR BLD AUTO: 0 % (ref 0–2)
LDLC SERPL CALC-MCNC: 65 MG/DL (ref 0–100)
LYMPHOCYTES # BLD AUTO: 1.28 THOUSANDS/ÂΜL (ref 0.6–4.47)
LYMPHOCYTES NFR BLD AUTO: 19 % (ref 14–44)
MCH RBC QN AUTO: 33.1 PG (ref 26.8–34.3)
MCHC RBC AUTO-ENTMCNC: 33.7 G/DL (ref 31.4–37.4)
MCV RBC AUTO: 98 FL (ref 82–98)
MONOCYTES # BLD AUTO: 0.5 THOUSAND/ÂΜL (ref 0.17–1.22)
MONOCYTES NFR BLD AUTO: 8 % (ref 4–12)
NEUTROPHILS # BLD AUTO: 4.63 THOUSANDS/ÂΜL (ref 1.85–7.62)
NEUTS SEG NFR BLD AUTO: 69 % (ref 43–75)
NONHDLC SERPL-MCNC: 88 MG/DL
NRBC BLD AUTO-RTO: 0 /100 WBCS
PLATELET # BLD AUTO: 211 THOUSANDS/UL (ref 149–390)
PMV BLD AUTO: 9.2 FL (ref 8.9–12.7)
POTASSIUM SERPL-SCNC: 4 MMOL/L (ref 3.5–5.3)
PROT SERPL-MCNC: 6.8 G/DL (ref 6.4–8.4)
PSA SERPL-MCNC: 5.96 NG/ML (ref 0–4)
RBC # BLD AUTO: 4.08 MILLION/UL (ref 3.88–5.62)
SODIUM SERPL-SCNC: 140 MMOL/L (ref 135–147)
TRIGL SERPL-MCNC: 117 MG/DL
VIT B12 SERPL-MCNC: 234 PG/ML (ref 180–914)
WBC # BLD AUTO: 6.69 THOUSAND/UL (ref 4.31–10.16)

## 2024-07-25 PROCEDURE — 85025 COMPLETE CBC W/AUTO DIFF WBC: CPT

## 2024-07-25 PROCEDURE — 80053 COMPREHEN METABOLIC PANEL: CPT

## 2024-07-25 PROCEDURE — 80061 LIPID PANEL: CPT

## 2024-07-25 PROCEDURE — 36415 COLL VENOUS BLD VENIPUNCTURE: CPT

## 2024-07-25 PROCEDURE — 82607 VITAMIN B-12: CPT

## 2024-07-25 PROCEDURE — G0103 PSA SCREENING: HCPCS

## 2024-07-25 PROCEDURE — 82306 VITAMIN D 25 HYDROXY: CPT

## 2024-07-25 PROCEDURE — 83036 HEMOGLOBIN GLYCOSYLATED A1C: CPT

## 2024-09-06 DIAGNOSIS — F41.1 GAD (GENERALIZED ANXIETY DISORDER): ICD-10-CM

## 2024-09-06 NOTE — TELEPHONE ENCOUNTER
Reason for call:   [x] Refill   [] Prior Auth  [] Other:     Office:   [] PCP/Provider -   [x] Specialty/Provider - Psych    Medication:       Does the patient have enough for 3 days?   [] Yes   [x] No - Send as HP to POD

## 2024-09-06 NOTE — TELEPHONE ENCOUNTER
Refill must be reviewed and completed by the office or provider. The refill is unable to be approved or denied by the medication management team. Cannot be delegated       06/10/2024 03/11/2024 ALPRAZolam (Tablet) 90.0 90 0.25 MG NA [facility] Medicare 1 / 1 PA      1 [id number] 03/14/2024 03/11/2024 ALPRAZolam (Tablet) 90.0 90 0.25 MG NA [facility] Medicare 0 / 1 PA    1 [id number] 12/16/2023 12/14/2023 ALPRAZolam (Tablet) 90.0 90 0.25 MG [facility]

## 2024-09-07 RX ORDER — ALPRAZOLAM 0.25 MG
TABLET ORAL
Qty: 90 TABLET | Refills: 1 | Status: SHIPPED | OUTPATIENT
Start: 2024-09-07

## 2024-12-12 ENCOUNTER — OFFICE VISIT (OUTPATIENT)
Dept: PSYCHIATRY | Facility: CLINIC | Age: 79
End: 2024-12-12
Payer: MEDICARE

## 2024-12-12 DIAGNOSIS — F41.9 ANXIETY: ICD-10-CM

## 2024-12-12 DIAGNOSIS — F33.42 RECURRENT MAJOR DEPRESSION IN FULL REMISSION (HCC): Primary | ICD-10-CM

## 2024-12-12 PROCEDURE — 99214 OFFICE O/P EST MOD 30 MIN: CPT | Performed by: NURSE PRACTITIONER

## 2024-12-12 NOTE — PSYCH
"    PROGRESS NOTE        [facility]      Name and Date of Birth:  Aaron Alvarez Jr. 79 y.o. 1945    Date of Visit: 12/12/24    SUBJECTIVE: Patient is an 79-year-old male has a history of anxiety disorder and depressive disorder.  Doing very well overall.  He is functioning well, he has no episodic gait disturbance.  Some complaints of \"dizziness \"when he reaches the top of the set of stairs.  I have advised him to speak with his PCP about this he might possibly need physical therapy.  Encouraged more physical activity.  Cognitively, he is fine.  No deficits.  No episodes of anxiety or depression.  Mood is stable.  He is looking forward to the [place] holiday.  Follow-up with me in 6 months or sooner if necessary.      He denies suicidal ideation, intent or plan at present, has no suicidal ideation, intent or plan at present.    He denies any auditory hallucinations and visual hallucinations, denies any other delusional thinking, denies any delusional thinking.    He denies any side effects from medications  .  HPI ROS Appetite Changes and Sleep: normal appetite, normal sleep    Review Of Systems:      Constitutional Negative   ENT Negative   Cardiovascular Negative   Respiratory As Noted in HPI   Gastrointestinal Negative   Genitourinary Negative   Musculoskeletal Negative   Integumentary Negative   Neurological Negative   Endocrine Negative   Other Symptoms Negative and None       Laboratory Results: No results found for this or any previous visit.    Substance Abuse History:    Social History     Substance and Sexual Activity   Drug Use No       Family Psychiatric History:     Family History   Problem Relation Age of Onset    Stroke Mother     Prostate cancer Father        The following portions of the patient's history were reviewed and updated as appropriate: past family history, past medical history, past social history, past surgical history and " problem list.    Social History     Socioeconomic History    Marital status: /Civil Union     Spouse name: Not on file    Number of children: Not on file    Years of education: Not on file    Highest education level: Not on file   Occupational History    Not on file   Tobacco Use    Smoking status: Former     Current packs/day: 0.00     Average packs/day: 1 pack/day for 24.0 years (24.0 ttl pk-yrs)     Types: Cigarettes     Start date: 1959     Quit date: 1983     Years since quittin.6    Smokeless tobacco: Never   Vaping Use    Vaping status: Never Used   Substance and Sexual Activity    Alcohol use: No    Drug use: No    Sexual activity: Never   Other Topics Concern    Not on file   Social History Narrative    Not on file     Social Drivers of Health     Financial Resource Strain: Not on file   Food Insecurity: Not on file   Transportation Needs: Not on file   Physical Activity: Not on file   Stress: Not on file   Social Connections: Not on file   Intimate Partner Violence: Not on file   Housing Stability: Not on file     Social History     Social History Narrative    Not on file        Social History       Tobacco History       Smoking Status  Former Smoking Start Date  1959 Quit Date  1983 Average Packs/Day  1 pack/day for 24.0 years (24.0 ttl pk-yrs) Smoking Tobacco Type  Cigarettes from 1959 to 1983   Pack Year History     Packs/Day From To Years    0 1983  41.6    1 1959 24.0      Smokeless Tobacco Use  Never              Alcohol History       Alcohol Use Status  No              Drug Use       Drug Use Status  No              Sexual Activity       Sexually Active  Never              Other Factors    Not Asked                       OBJECTIVE:     Mental Status Evaluation:    Appearance age appropriate, casually dressed   Behavior pleasant, cooperative   Speech normal volume, normal pitch   Mood Stable mood   Affect Bright affect   Thought Processes  logical   Associations intact associations   Thought Content No overt delusions   Perceptual Disturbances: none   Abnormal Thoughts  Risk Potential Suicidal ideation - None  Homicidal ideation - None  Potential for aggression - No   Orientation oriented to person, place, time/date and situation   Memory recent and remote memory grossly intact   Cosciousness alert and awake   Attention Span attention span and concentration are age appropriate   Intellect Appears to be of Average Intelligence   Insight Good   Judgement Good   Muscle Strength and  Gait muscle strength and tone were normal   Language no difficulty naming common objects   Fund of Knowledge displays adequate knowledge of current events   Pain none   Pain Scale 0       Assessment/Plan:   Cymbalta 60 mg daily  Xanax 0.25 mg tab at bedtime as needed to help with sleep follow-up with me in 6 months or sooner if necessary.       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              Treatment Recommendations/Precautions:  Treatment status:.  Patient is doing well.  Will continue to follow every 3 months.  Mood is stable.    Risks/Benefits      Risks, Benefits And Possible Side Effects Of Medications:    Risks, benefits, and possible side effects of medications explained to patient and patient verbalizes understanding and agreement for treatment.    Controlled Medication Discussion: Takes all medications as prescribed.  Fills all prescriptions on time.  No evidence of any lethargy or sedation.  No gait disturbance.        Psychotherapy Provided:     Individual psychotherapy provided: No.  The visit started at 9 AM and  ended at 9:30 AM.  Time spent reviewing the treatment plan, reviewing medications, ordering medications and completing the progress note

## 2024-12-12 NOTE — PSYCH
"TREATMENT PLAN (Medication Management Only)        [facility]    Name and Date of Birth:  Aaron Alvarez Jr. 79 y.o. 1945  Date of Treatment Plan: December 12, 2024  Diagnosis/Diagnoses:    1. Recurrent major depression in full remission (HCC)    2. Anxiety      Strengths/Personal Resources for Self-Care: supportive family, supportive friends.  Area/Areas of need (in own words): \" I am feeling well.  I have no complaints.\".  1. Long Term Goal: \" Continue to feel well and function at my best\".   Target Date: 1 year - 12/12/2025  Person/Persons responsible for completion of goal: CATY Beltre  2.  Short Term Objective (s) - How will we reach this goal?:   A.  Provider new recommended medication/dosage changes and/or continue medication(s): continue current medications as prescribed.  B.  N/A.    Target Date: 3 months - 3/12/2025  Person/Persons Responsible for Completion of Goal: CATY Beltre  Progress Towards Goals: Continuing Treatment  Treatment Modality: medication management every 3 months  Review due 6 months from date of this plan: 6 months - 6/12/2025  Expected length of service: maintenance unless revised  My Physician/PA/NP and I have developed this plan together and I agree to work on the goals and objectives. I understand the treatment goals that were developed for my treatment.  "

## 2025-02-13 DIAGNOSIS — F41.1 GAD (GENERALIZED ANXIETY DISORDER): ICD-10-CM

## 2025-02-13 RX ORDER — ALPRAZOLAM 0.25 MG/1
0.25 TABLET ORAL
Qty: 90 TABLET | Refills: 1 | Status: SHIPPED | OUTPATIENT
Start: 2025-02-13

## 2025-02-21 DIAGNOSIS — F33.40 RECURRENT MAJOR DEPRESSIVE DISORDER, IN REMISSION (HCC): ICD-10-CM

## 2025-02-21 RX ORDER — DULOXETIN HYDROCHLORIDE 60 MG/1
60 CAPSULE, DELAYED RELEASE ORAL DAILY
Qty: 90 CAPSULE | Refills: 0 | Status: SHIPPED | OUTPATIENT
Start: 2025-02-21

## 2025-05-05 DIAGNOSIS — I10 PRIMARY HYPERTENSION: ICD-10-CM

## 2025-05-05 DIAGNOSIS — R42 POSTURAL DIZZINESS WITH PRESYNCOPE: ICD-10-CM

## 2025-05-05 DIAGNOSIS — R55 POSTURAL DIZZINESS WITH PRESYNCOPE: ICD-10-CM

## 2025-05-06 RX ORDER — AMLODIPINE BESYLATE 5 MG/1
5 TABLET ORAL DAILY
Qty: 90 TABLET | Refills: 3 | Status: SHIPPED | OUTPATIENT
Start: 2025-05-06

## 2025-05-06 NOTE — TELEPHONE ENCOUNTER
Refill must be reviewed and completed by the office or provider. The refill is unable to be approved or denied by the medication management team.      Last seen 2023 - Please review to see if the refill is appropriate.

## 2025-05-08 DIAGNOSIS — I10 ESSENTIAL HYPERTENSION: ICD-10-CM

## 2025-05-08 RX ORDER — LOSARTAN POTASSIUM AND HYDROCHLOROTHIAZIDE 12.5; 5 MG/1; MG/1
1 TABLET ORAL DAILY
Qty: 90 TABLET | Refills: 6 | Status: SHIPPED | OUTPATIENT
Start: 2025-05-08

## 2025-05-19 ENCOUNTER — HOSPITAL ENCOUNTER (OUTPATIENT)
Dept: NON INVASIVE DIAGNOSTICS | Facility: CLINIC | Age: 80
Discharge: HOME | End: 2025-05-19
Payer: MEDICARE

## 2025-05-19 DIAGNOSIS — I72.4 ANEURYSM OF POPLITEAL ARTERY (HCC): ICD-10-CM

## 2025-05-19 DIAGNOSIS — I71.40 ABDOMINAL AORTIC ANEURYSM (AAA) WITHOUT RUPTURE, UNSPECIFIED PART (HCC): ICD-10-CM

## 2025-05-19 DIAGNOSIS — I73.9 PAD (PERIPHERAL ARTERY DISEASE) (HCC): ICD-10-CM

## 2025-05-19 PROCEDURE — 93978 VASCULAR STUDY: CPT

## 2025-05-20 PROCEDURE — 93978 VASCULAR STUDY: CPT | Performed by: SURGERY

## 2025-05-23 DIAGNOSIS — F33.40 RECURRENT MAJOR DEPRESSIVE DISORDER, IN REMISSION (HCC): ICD-10-CM

## 2025-05-23 RX ORDER — DULOXETIN HYDROCHLORIDE 60 MG/1
60 CAPSULE, DELAYED RELEASE ORAL DAILY
Qty: 90 CAPSULE | Refills: 0 | Status: SHIPPED | OUTPATIENT
Start: 2025-05-23

## 2025-06-02 ENCOUNTER — RESULTS FOLLOW-UP (OUTPATIENT)
Dept: VASCULAR SURGERY | Facility: CLINIC | Age: 80
End: 2025-06-02

## 2025-06-11 ENCOUNTER — HOSPITAL ENCOUNTER (OUTPATIENT)
Dept: VASCULAR ULTRASOUND | Facility: HOSPITAL | Age: 80
Discharge: HOME | End: 2025-06-11
Payer: MEDICARE

## 2025-06-11 ENCOUNTER — HOSPITAL ENCOUNTER (OUTPATIENT)
Dept: VASCULAR ULTRASOUND | Facility: HOSPITAL | Age: 80
Discharge: HOME | End: 2025-06-11

## 2025-06-11 DIAGNOSIS — I72.4 ANEURYSM OF POPLITEAL ARTERY (HCC): ICD-10-CM

## 2025-06-11 DIAGNOSIS — I71.40 ABDOMINAL AORTIC ANEURYSM (AAA) WITHOUT RUPTURE, UNSPECIFIED PART (HCC): ICD-10-CM

## 2025-06-11 DIAGNOSIS — I73.9 PAD (PERIPHERAL ARTERY DISEASE) (HCC): ICD-10-CM

## 2025-06-11 DIAGNOSIS — I65.23 BILATERAL CAROTID ARTERY STENOSIS: ICD-10-CM

## 2025-06-11 PROCEDURE — 93922 UPR/L XTREMITY ART 2 LEVELS: CPT | Performed by: SURGERY

## 2025-06-11 PROCEDURE — 93880 EXTRACRANIAL BILAT STUDY: CPT | Performed by: SURGERY

## 2025-06-11 PROCEDURE — 93925 LOWER EXTREMITY STUDY: CPT | Performed by: SURGERY

## 2025-06-12 ENCOUNTER — OFFICE VISIT (OUTPATIENT)
Dept: PSYCHIATRY | Facility: CLINIC | Age: 80
End: 2025-06-12
Payer: MEDICARE

## 2025-06-12 DIAGNOSIS — F33.42 RECURRENT MAJOR DEPRESSION IN FULL REMISSION (HCC): Primary | ICD-10-CM

## 2025-06-12 DIAGNOSIS — F33.40 RECURRENT MAJOR DEPRESSIVE DISORDER, IN REMISSION (HCC): ICD-10-CM

## 2025-06-12 DIAGNOSIS — F41.1 GAD (GENERALIZED ANXIETY DISORDER): ICD-10-CM

## 2025-06-12 DIAGNOSIS — F41.9 ANXIETY: ICD-10-CM

## 2025-06-12 PROCEDURE — 99215 OFFICE O/P EST HI 40 MIN: CPT | Performed by: NURSE PRACTITIONER

## 2025-06-12 NOTE — PSYCH
"TREATMENT PLAN (Medication Management Only)        [facility]    Name and Date of Birth:  Aaron Alvarez Jr. 80 y.o. 1945  Date of Treatment Plan: June 12, 2025  Diagnosis/Diagnoses:    1. Recurrent major depression in full remission (HCC)    2. DAVIE (generalized anxiety disorder)    3. Anxiety    4. Recurrent major depressive disorder, in remission (HCC)      Strengths/Personal Resources for Self-Care: supportive family, supportive friends.  Area/Areas of need (in own words): \" I feel very sad because I lost my son Rufino several months ago.  But I am managing.\".  1. Long Term Goal: \" To continue to journey through the grief process and function at my best.\".   Target Date: 1 year - 6/12/2026  Person/Persons responsible for completion of goal: CATY Beltre  2.  Short Term Objective (s) - How will we reach this goal?:   A.  Provider new recommended medication/dosage changes and/or continue medication(s): continue current medications as prescribed.  B.  N/A.    Target Date: 3 months - 9/12/2025  Person/Persons Responsible for Completion of Goal: CATY Beltre  Progress Towards Goals: Continuing Treatment  Treatment Modality: medication management every 3 months  Review due 6 months from date of this plan: 6 months - 12/12/2025  Expected length of service: maintenance unless revised  My Physician/PA/NP and I have developed this plan together and I agree to work on the goals and objectives. I understand the treatment goals that were developed for my treatment.  "

## 2025-06-12 NOTE — PSYCH
MEDICATION MANAGEMENT NOTE    Name: Aaron Alvarez Jr.      : 1945      MRN: [id number]  Encounter Provider: CATY Estrada  Encounter Date: 2025   Encounter department: [facility]    Insurance: Payor: MEDICARE / Plan: MEDICARE A AND B / Product Type: Medicare A & B Fee for Service /      Reason for Visit:   Chief Complaint   Patient presents with    Medication Management    Follow-up   :  Assessment & Plan  Recurrent major depression in full remission (HCC)  Cymbalta 60 mg daily       DAVIE (generalized anxiety disorder)  Xanax 0.25 mg, 1 tab at bedtime as needed to help with sleep       Will continue on:  Cymbalta 60 mg daily  Xanax 0.25 mg, 1-2 tabs at bedtime as needed to help with sleep  Follow-up with me in 3 months or sooner if necessary.    Treatment Recommendations:    Educated about diagnosis and treatment modalities. Verbalizes understanding and agreement with the treatment plan.  Discussed self monitoring of symptoms, and symptom monitoring tools.  Discussed medications and if treatment adjustment was needed or desired.  Aware of 24 hour and weekend coverage for urgent situations accessed by calling [facility] main practice number  I am scheduling this patient out for greater than 3 months: No    Medications Risks/Benefits:      Risks, Benefits And Possible Side Effects Of Medications:    Risks, benefits, and possible side effects of medications explained to Rufino and he (or legal representative) verbalizes understanding and agreement for treatment.    Controlled Medication Discussion:     Rufino has been filling controlled prescriptions on time as prescribed according to [place] Prescription Drug Monitoring Program.      History of Present Illness     CC: Rufino presents today for follow up on 2025 for treatment of depressive disorder and anxiety disorder.  Continue to monitor closely for grief issues related to the  recent passing of his son.     Rufino presents today accompanied by his wife, Janie.  They were both very sad.  Their son, Rufino, unfortunately ended his life in March of this year.  They are both grieving the loss.  They were able to spend the time with me today to talk about Rufino and how loving he was and what a great son he was.  They are attribute to the man that he became.  Rufino is doing fairly well with his current medication regimen.  He tells me he is sleeping well and he is eating well.  No significant weight loss since the death of his son.  He continues to activities in life.  His daughter, will be moving to [place] with her wife sometime within the next few months.  They certainly will miss her but they are looking forward to seeing her in [place].  We will continue him on his current medication regimen and we will follow-up in 3 months.  Certainly, if Rufino needs to see an individual therapist or if he needs referral to grief counseling, he can contact me at any time.    Med Compliance: yes    Since our last visit, overall symptoms have been stable.  Still feeling the grief and loss of losing his son.  He will stay in touch with me if he would like a referral for therapy or grief counseling.    HPI ROS:     Medication Side Effects: None  Depression: 3 /10 (10 worst)  Anxiety: 3 /10 (10 worst)  Safety concerns (SI, HI, others): None  Sleep: Sleeping well  Energy: Adequate energy level  Appetite: Good appetite  Weight Change: No significant weight changes    Rufino denies any side effects from medications unless noted above.    Review Of Systems: Review of systems as noted above in HPI/Subjective. A relevant review of symptoms was otherwise negative    Current Rating Scores:     None completed today.    Areas of Improvement: reviewed in HPI/Subjective Section and reviewed in Assessment and Plan Section      Past Medical History[1]  Past Surgical History[2]  Allergies: Allergies[3]    Current Outpatient Medications    Medication Instructions    acetaminophen (TYLENOL) 975 mg, Oral, Every 8 hours PRN    ALPRAZolam (XANAX) 0.25 mg, Oral, Daily at bedtime, For sleep    amLODIPine (NORVASC) 5 mg, Oral, Daily    aspirin 81 MG tablet 1 tablet, Oral, Daily    atorvastatin (LIPITOR) 40 mg, Oral, Daily    Blood Pressure Monitor KIT Does not apply, Daily    DULoxetine (CYMBALTA) 60 mg, Oral, Daily    losartan-hydrochlorothiazide (HYZAAR) 50-12.5 mg per tablet 1 tablet, Oral, Daily    Vitamin D3 1,000 Units, Oral, Daily        Substance Abuse History:    Tobacco, Alcohol and Drug Use History     Tobacco Use    Smoking status: Former     Current packs/day: 0.00     Average packs/day: 1 pack/day for 24.0 years (24.0 ttl pk-yrs)     Types: Cigarettes     Start date: 1959     Quit date: 1983     Years since quittin.1    Smokeless tobacco: Never   Vaping Use    Vaping status: Never Used   Substance Use Topics    Alcohol use: No    Drug use: No          Social History:    Social History     Socioeconomic History    Marital status: /Civil Union     Spouse name: Not on file    Number of children: Not on file    Years of education: Not on file    Highest education level: Not on file   Occupational History    Not on file   Other Topics Concern    Not on file   Social History Narrative    Not on file        Family Psychiatric History:     Family History[4]    Medical History Reviewed by provider this encounter:  Tobacco  Allergies  Meds  Problems  Med Hx  Surg Hx  Fam Hx          Objective   There were no vitals taken for this visit.     Mental Status Evaluation:    Appearance age appropriate, casually dressed, dressed appropriately   Behavior cooperative, calm   Speech normal rate, normal volume, normal pitch, spontaneous   Mood Appears sad at times.  Mostly related to the grief associated with the loss of his son that occurred in 2025.   Affect normal range and intensity, appropriate, reactive   Thought Processes  organized, goal directed, linear   Thought Content no overt delusions   Perceptual Disturbances: no auditory hallucinations, no visual hallucinations   Abnormal Thoughts  Risk Potential Suicidal ideation - None  Homicidal ideation - None  Potential for aggression - No   Orientation oriented to person, place, time/date, and situation   Memory recent and remote memory grossly intact   Consciousness alert and awake   Attention Span Concentration Span attention span and concentration are age appropriate   Intellect appears to be of average intelligence   Insight intact and good   Judgement intact and good   Muscle Strength and  Gait normal muscle strength and normal muscle tone, normal gait and normal balance   Motor activity no abnormal movements   Language no difficulty naming common objects, no difficulty repeating a phrase, no difficulty writing a sentence   Fund of Knowledge adequate knowledge of current events  adequate fund of knowledge regarding past history  adequate fund of knowledge regarding vocabulary        Laboratory Results: I have personally reviewed all pertinent laboratory/tests results    Recent Labs (last 6 months):   No visits with results within 6 Month(s) from this visit.   Latest known visit with results is:   Appointment on 07/25/2024   Component Date Value    WBC 07/25/2024 6.69     RBC 07/25/2024 4.08     Hemoglobin 07/25/2024 13.5     Hematocrit 07/25/2024 40.1     MCV 07/25/2024 98     MCH 07/25/2024 33.1     MCHC 07/25/2024 33.7     RDW 07/25/2024 12.5     MPV 07/25/2024 9.2     Platelets 07/25/2024 211     nRBC 07/25/2024 0     Segmented % 07/25/2024 69     Immature Grans % 07/25/2024 0     Lymphocytes % 07/25/2024 19     Monocytes % 07/25/2024 8     Eosinophils Relative 07/25/2024 3     Basophils Relative 07/25/2024 1     Absolute Neutrophils 07/25/2024 4.63     Absolute Immature Grans 07/25/2024 0.02     Absolute Lymphocytes 07/25/2024 1.28     Absolute Monocytes 07/25/2024 0.50      Eosinophils Absolute 07/25/2024 0.20     Basophils Absolute 07/25/2024 0.06     Sodium 07/25/2024 140     Potassium 07/25/2024 4.0     Chloride 07/25/2024 103     CO2 07/25/2024 32     ANION GAP 07/25/2024 5     BUN 07/25/2024 21     Creatinine 07/25/2024 1.19     Glucose, Fasting 07/25/2024 100 (H)     Calcium 07/25/2024 9.6     AST 07/25/2024 18     ALT 07/25/2024 12     Alkaline Phosphatase 07/25/2024 75     Total Protein 07/25/2024 6.8     Albumin 07/25/2024 4.0     Total Bilirubin 07/25/2024 0.85     eGFR 07/25/2024 57     Hemoglobin A1C 07/25/2024 5.5     EAG 07/25/2024 111     Cholesterol 07/25/2024 140     Triglycerides 07/25/2024 117     HDL, Direct 07/25/2024 52     LDL Calculated 07/25/2024 65     Non-HDL-Chol (CHOL-HDL) 07/25/2024 88     Vitamin B-12 07/25/2024 234     PSA 07/25/2024 5.955 (H)     Vit D, 25-Hydroxy 07/25/2024 >120.0 (H)        Suicide/Homicide Risk Assessment:    Risk of Harm to Self:  Based on today's assessment, Rufino presents the following risk of harm to self: none    Risk of Harm to Others:  Based on today's assessment, Rufino presents the following risk of harm to others: none    The following interventions are recommended: Continue medication management. No other intervention changes indicated at this time.    Psychotherapy Provided:     Individual psychotherapy provided: No    Treatment Plan:    Completed and signed during the session: Yes - with Rufino.    Goals: Progress towards Treatment Plan goals - Yes, progressing, as evidenced by subjective findings in HPI/Subjective Section and in Assessment and Plan Section    Depression Follow-up Plan Completed: Yes    Note Share:    This note was shared with patient.    Administrative Statements   Administrative Statements   I have spent a total time of 45 minutes in caring for this patient on the day of the visit/encounter including Counseling / Coordination of care.    Visit Time  Visit Start Time: 9:00AM  Visit Stop Time: 9:50AM  Total  "Visit Duration: 50 minutes    Portions of the record may have been created with voice recognition software. Occasional wrong word or \"sound a like\" substitutions may have occurred due to the inherent limitations of voice recognition software. Read the chart carefully and recognize, using context, where substitutions have occurred.    CATY Estrada 06/12/25       [1]   Past Medical History:  Diagnosis Date    Anxiety     Aortic dissection (HCC)     Brain injury (HCC)     Hx of fall on ice in winter 2021- treated at [facility]-still notices occasional sharp pains in head with cough     Colon polyp     COPD (chronic obstructive pulmonary disease) (HCC)     PCP states mild    CPAP (continuous positive airway pressure) dependence     Depression     Hyperlipidemia     Hypertension     Kidney stone     Rectal bleeding     hx of when he was taking Eliquis    Renal cyst     Renal cyst     Sleep apnea     Sleep apnea, obstructive    [2]   Past Surgical History:  Procedure Laterality Date    ABDOMINAL AORTIC ANEURYSM REPAIR      about 20 years ago    CELIAC ARTERY STENT      COLONOSCOPY      KIDNEY STONE SURGERY     [3]   Allergies  Allergen Reactions    Lorazepam Hyperactivity   [4]   Family History  Problem Relation Name Age of Onset    Stroke Mother      Prostate cancer Father       "

## 2025-06-18 ENCOUNTER — APPOINTMENT (OUTPATIENT)
Dept: LAB | Facility: CLINIC | Age: 80
End: 2025-06-18
Attending: NEUROMUSCULOSKELETAL MEDICINE & OMM
Payer: MEDICARE

## 2025-06-18 DIAGNOSIS — I15.9 SECONDARY HYPERTENSION: ICD-10-CM

## 2025-06-18 DIAGNOSIS — D64.9 ANEMIA, UNSPECIFIED TYPE: ICD-10-CM

## 2025-06-18 DIAGNOSIS — Z12.5 ENCOUNTER FOR SCREENING PROSTATE SPECIFIC ANTIGEN (PSA) MEASUREMENT: ICD-10-CM

## 2025-06-18 DIAGNOSIS — D51.9 ANEMIA DUE TO VITAMIN B12 DEFICIENCY, UNSPECIFIED B12 DEFICIENCY TYPE: ICD-10-CM

## 2025-06-18 DIAGNOSIS — E55.9 AVITAMINOSIS D: ICD-10-CM

## 2025-06-18 DIAGNOSIS — E78.5 HYPERLIPIDEMIA, UNSPECIFIED HYPERLIPIDEMIA TYPE: ICD-10-CM

## 2025-06-18 LAB
25(OH)D3 SERPL-MCNC: 54.5 NG/ML (ref 30–100)
ALBUMIN SERPL BCG-MCNC: 4 G/DL (ref 3.5–5)
ALP SERPL-CCNC: 94 U/L (ref 34–104)
ALT SERPL W P-5'-P-CCNC: 8 U/L (ref 7–52)
ANION GAP SERPL CALCULATED.3IONS-SCNC: 7 MMOL/L (ref 4–13)
AST SERPL W P-5'-P-CCNC: 15 U/L (ref 13–39)
BASOPHILS # BLD AUTO: 0.05 THOUSANDS/ÂΜL (ref 0–0.1)
BASOPHILS NFR BLD AUTO: 1 % (ref 0–1)
BILIRUB SERPL-MCNC: 1 MG/DL (ref 0.2–1)
BUN SERPL-MCNC: 32 MG/DL (ref 5–25)
CALCIUM SERPL-MCNC: 9.3 MG/DL (ref 8.4–10.2)
CHLORIDE SERPL-SCNC: 100 MMOL/L (ref 96–108)
CHOLEST SERPL-MCNC: 112 MG/DL (ref ?–200)
CO2 SERPL-SCNC: 30 MMOL/L (ref 21–32)
CREAT SERPL-MCNC: 1.7 MG/DL (ref 0.6–1.3)
EOSINOPHIL # BLD AUTO: 0.12 THOUSAND/ÂΜL (ref 0–0.61)
EOSINOPHIL NFR BLD AUTO: 1 % (ref 0–6)
ERYTHROCYTE [DISTWIDTH] IN BLOOD BY AUTOMATED COUNT: 12.3 % (ref 11.6–15.1)
GFR SERPL CREATININE-BSD FRML MDRD: 36 ML/MIN/1.73SQ M
GLUCOSE P FAST SERPL-MCNC: 118 MG/DL (ref 65–99)
HCT VFR BLD AUTO: 39.4 % (ref 36.5–49.3)
HDLC SERPL-MCNC: 48 MG/DL
HGB BLD-MCNC: 13.6 G/DL (ref 12–17)
IMM GRANULOCYTES # BLD AUTO: 0.04 THOUSAND/UL (ref 0–0.2)
IMM GRANULOCYTES NFR BLD AUTO: 0 % (ref 0–2)
LDLC SERPL CALC-MCNC: 46 MG/DL (ref 0–100)
LYMPHOCYTES # BLD AUTO: 1.25 THOUSANDS/ÂΜL (ref 0.6–4.47)
LYMPHOCYTES NFR BLD AUTO: 13 % (ref 14–44)
MCH RBC QN AUTO: 33.7 PG (ref 26.8–34.3)
MCHC RBC AUTO-ENTMCNC: 34.5 G/DL (ref 31.4–37.4)
MCV RBC AUTO: 98 FL (ref 82–98)
MONOCYTES # BLD AUTO: 0.63 THOUSAND/ÂΜL (ref 0.17–1.22)
MONOCYTES NFR BLD AUTO: 6 % (ref 4–12)
NEUTROPHILS # BLD AUTO: 7.84 THOUSANDS/ÂΜL (ref 1.85–7.62)
NEUTS SEG NFR BLD AUTO: 79 % (ref 43–75)
NONHDLC SERPL-MCNC: 64 MG/DL
NRBC BLD AUTO-RTO: 0 /100 WBCS
PLATELET # BLD AUTO: 211 THOUSANDS/UL (ref 149–390)
PMV BLD AUTO: 8.7 FL (ref 8.9–12.7)
POTASSIUM SERPL-SCNC: 3.7 MMOL/L (ref 3.5–5.3)
PROT SERPL-MCNC: 6.7 G/DL (ref 6.4–8.4)
PSA SERPL-MCNC: 8.7 NG/ML (ref 0–4)
RBC # BLD AUTO: 4.04 MILLION/UL (ref 3.88–5.62)
SODIUM SERPL-SCNC: 137 MMOL/L (ref 135–147)
TRIGL SERPL-MCNC: 89 MG/DL (ref ?–150)
VIT B12 SERPL-MCNC: 289 PG/ML (ref 180–914)
WBC # BLD AUTO: 9.93 THOUSAND/UL (ref 4.31–10.16)

## 2025-06-18 PROCEDURE — G0103 PSA SCREENING: HCPCS

## 2025-06-18 PROCEDURE — 82306 VITAMIN D 25 HYDROXY: CPT

## 2025-06-18 PROCEDURE — 85025 COMPLETE CBC W/AUTO DIFF WBC: CPT

## 2025-06-18 PROCEDURE — 82607 VITAMIN B-12: CPT

## 2025-06-18 PROCEDURE — 36415 COLL VENOUS BLD VENIPUNCTURE: CPT

## 2025-06-18 PROCEDURE — 80061 LIPID PANEL: CPT

## 2025-06-18 PROCEDURE — 80053 COMPREHEN METABOLIC PANEL: CPT

## 2025-07-18 ENCOUNTER — TELEPHONE (OUTPATIENT)
Age: 80
End: 2025-07-18

## 2025-07-18 ENCOUNTER — OFFICE VISIT (OUTPATIENT)
Dept: CARDIOLOGY CLINIC | Facility: CLINIC | Age: 80
End: 2025-07-18
Payer: MEDICARE

## 2025-07-18 VITALS
OXYGEN SATURATION: 96 % | BODY MASS INDEX: 27.16 KG/M2 | WEIGHT: 194 LBS | DIASTOLIC BLOOD PRESSURE: 52 MMHG | SYSTOLIC BLOOD PRESSURE: 102 MMHG | TEMPERATURE: 97.2 F | HEIGHT: 71 IN

## 2025-07-18 DIAGNOSIS — I35.1 NONRHEUMATIC AORTIC VALVE INSUFFICIENCY: ICD-10-CM

## 2025-07-18 DIAGNOSIS — N18.31 STAGE 3A CHRONIC KIDNEY DISEASE (HCC): ICD-10-CM

## 2025-07-18 DIAGNOSIS — I71.019 DISSECTION OF THORACIC AORTA, UNSPECIFIED PART (HCC): ICD-10-CM

## 2025-07-18 DIAGNOSIS — R00.1 BRADYCARDIA: Primary | ICD-10-CM

## 2025-07-18 DIAGNOSIS — I10 PRIMARY HYPERTENSION: ICD-10-CM

## 2025-07-18 PROCEDURE — 99214 OFFICE O/P EST MOD 30 MIN: CPT | Performed by: INTERNAL MEDICINE

## 2025-07-18 PROCEDURE — 93000 ELECTROCARDIOGRAM COMPLETE: CPT | Performed by: INTERNAL MEDICINE

## 2025-07-18 RX ORDER — TAMSULOSIN HYDROCHLORIDE 0.4 MG/1
2 CAPSULE ORAL DAILY
COMMUNITY
Start: 2025-05-08

## 2025-07-18 NOTE — ASSESSMENT & PLAN NOTE
Lab Results   Component Value Date    EGFR 36 06/18/2025    EGFR 57 07/25/2024    EGFR 43 06/28/2024    CREATININE 1.70 (H) 06/18/2025    CREATININE 1.19 07/25/2024    CREATININE 1.50 (H) 06/28/2024   Primary care follow up.  Labs reviewed with patient.

## 2025-07-18 NOTE — PATIENT INSTRUCTIONS
Echocardiogram planned to evaluate heart function and rule out significant valvular heart disease.

## 2025-07-18 NOTE — ASSESSMENT & PLAN NOTE
History of type B thoracic aortic dissection status post TEVAR in 2011, status post left subclavian occlusion with Amplatzer device and carotid subclavian bypass.  Recent follow-up with vascular surgery reviewed.  Imaging studies reviewed.

## 2025-07-18 NOTE — ASSESSMENT & PLAN NOTE
BP Readings from Last 3 Encounters:   06/28/24 121/55   05/23/24 112/62   06/08/23 116/66   Blood pressure remains well-controlled.  Creatinine now 1.7.  Prior creatinine has been between 1.2-1.5.  Follow-up closely with primary care.  Currently on amlodipine and losartan/HCT.  Continue current medicines.  Lifestyle modification.  Home blood pressure monitoring advised.

## 2025-07-18 NOTE — PROGRESS NOTES
[facility]  [facility] [address]  Phone#  [phone]  Fax#  [phone]  [facility] Office Follow-up Visit             NAME: Aaron Alvarez Jr.  AGE: 80 y.o. SEX: male   : 1945   MRN: [id number]    DATE: 2025  TIME: 9:50 AM    Cardiology Problem list:  Bradycardia: Beta-blockers and verapamil stopped   Hypertension: Losartan added   Echo: 3/21: EF 60, grade 1 diastolic dysfunction, mild to moderate AI, mild MR  Coronary artery calcification (moderate)  Type B thoracic aortic dissection status post TEVAR , left subclavian occlusion, carotid subclavian bypass.  Flow in false lumen distal to TEVAR.  Right renal supplied both by false and true lumen.  Celiac, SMA and left renal supplied by true lumen:  Vascular surgery follows      Assessment & Plan  Bradycardia  Prior history of sinus bradycardia related to medications.  Verapamil was stopped in .  Currently on no AV antolin blocking drugs.  Heart rate between 60 and 70.  Asymptomatic from this.  EKG: NSR RBBB    Orders:    POCT ECG    Echo complete w/ contrast if indicated; Future    Dissection of thoracic aorta, unspecified part (HCC)  History of type B thoracic aortic dissection status post TEVAR in , status post left subclavian occlusion with Amplatzer device and carotid subclavian bypass.  Recent follow-up with vascular surgery reviewed.  Imaging studies reviewed.         Primary hypertension  BP Readings from Last 3 Encounters:   24 121/55   24 112/62   23 116/66   Blood pressure remains well-controlled.  Creatinine now 1.7.  Prior creatinine has been between 1.2-1.5.  Follow-up closely with primary care.  Currently on amlodipine and losartan/HCT.  Continue current medicines.  Lifestyle modification.  Home blood pressure monitoring advised.           Stage 3a chronic kidney disease (HCC)  Lab Results   Component Value Date    EGFR 36 2025    EGFR  57 07/25/2024    EGFR 43 06/28/2024    CREATININE 1.70 (H) 06/18/2025    CREATININE 1.19 07/25/2024    CREATININE 1.50 (H) 06/28/2024   Primary care follow up.  Labs reviewed with patient.          Nonrheumatic aortic valve insufficiency  Mild to mod AI on last Echo.  Repeat Echo requested.   Orders:    Echo complete w/ contrast if indicated; Future           HPI:    Aaron Alvarez Jr. is a 80 y.o.-year-old male who presents to the cardiology clinic for follow up for the above-listed problems.  He was last seen in the cardiology clinic in 5/2023.  Interim follow-up with vascular surgery reviewed.  No recent cardiac testing.  Last echo in 2021 showed mild to moderate AI with preserved EF.  No follow-up.  Currently on aspirin, statins, losartan/HCT and amlodipine.  Recent ER visit for COVID-like symptoms.  No angina or worsening dyspnea.  Creatinine historically around 1.5.  Current creatinine about 1.7.  No change in medications.  Had dizziness for a few days  and fall when on antibiotics a month ago. NO recurrence.     Past history, family history, social history, current medications, vital signs, recent lab and imaging studies and  prior cardiology studies reviewed independently on this visit.    Current Outpatient Medications   Medication Instructions    acetaminophen (TYLENOL) 975 mg, Oral, Every 8 hours PRN    ALPRAZolam (XANAX) 0.25 mg, Oral, Daily at bedtime, For sleep    amLODIPine (NORVASC) 5 mg, Oral, Daily    aspirin 81 MG tablet 1 tablet, Oral, Daily    atorvastatin (LIPITOR) 40 mg, Oral, Daily    Blood Pressure Monitor KIT Does not apply, Daily    DULoxetine (CYMBALTA) 60 mg, Oral, Daily    losartan-hydrochlorothiazide (HYZAAR) 50-12.5 mg per tablet 1 tablet, Oral, Daily    Vitamin D3 1,000 Units, Oral, Daily        Review of Systems   Constitutional:  Negative for fatigue.   HENT: Negative.     Eyes: Negative.    Respiratory:  Negative for shortness of breath.    Cardiovascular:  Negative for chest  pain, palpitations and leg swelling.   Gastrointestinal: Negative.    Endocrine: Negative.    Neurological:  Positive for dizziness. Negative for syncope.   Hematological: Negative.    All other systems reviewed and are negative.      Objective:     Vitals:    07/18/25 0944   Temp: (!) 97.2 °F (36.2 °C)   SpO2: 96%     Wt Readings from Last 3 Encounters:   07/18/25 88 kg (194 lb)   05/23/24 88.5 kg (195 lb)   06/08/23 90.7 kg (200 lb)     Pulse Readings from Last 3 Encounters:   06/28/24 76   05/23/24 70   06/08/23 64     BP Readings from Last 3 Encounters:   06/28/24 121/55   05/23/24 112/62   06/08/23 116/66     Physical Exam  Vitals reviewed.   Constitutional:       General: He is not in acute distress.  HENT:      Head: Normocephalic.     Cardiovascular:      Rate and Rhythm: Normal rate and regular rhythm.      Heart sounds: S1 normal and S2 normal. Heart sounds are distant. No murmur heard.  Pulmonary:      Breath sounds: No wheezing or rales.     Musculoskeletal:         General: No swelling.     Skin:     General: Skin is warm.     Neurological:      Mental Status: He is alert.     Psychiatric:         Mood and Affect: Mood normal.         Pertinent Laboratory/Diagnostic Studies:    Laboratory studies reviewed personally by Iban Genao MD    BMP:   Lab Results   Component Value Date    SODIUM 137 06/18/2025    K 3.7 06/18/2025     06/18/2025    CO2 30 06/18/2025    BUN 32 (H) 06/18/2025    CREATININE 1.70 (H) 06/18/2025    GLUC 105 06/28/2024    CALCIUM 9.3 06/18/2025     CBC:  Lab Results   Component Value Date    WBC 9.93 06/18/2025    HGB 13.6 06/18/2025    HCT 39.4 06/18/2025    MCV 98 06/18/2025     06/18/2025     Lipid Profile:   Lab Results   Component Value Date    HDL 48 06/18/2025     Lab Results   Component Value Date    LDLCALC 46 06/18/2025     Lab Results   Component Value Date    TRIG 89 06/18/2025      Other labs:  Lab Results   Component Value Date    TCB7AFUYPBJZ 3.839  "12/04/2022     Lab Results   Component Value Date    ALT 8 06/18/2025    AST 15 06/18/2025     Lab Results   Component Value Date    HGBA1C 5.5 07/25/2024         Imaging Studies:     Pertinent imaging studies and cardiac studies were independently reviewed on this visit and findings summarized.    I have spent a total time of 27  minutes in caring for this patient on the day of the visit/encounter including reviewing and entering of medical history, physical examination, diagnostic results, instructions for management, patient education, risk factor reduction, documenting in the medical record, sending communications to other providers, reviewing/ordering tests, medicine, procedures etc.    Iban Genao MD, [facility]    Portions of the record may have been created with voice recognition software.  Occasional wrong word or \"sound alike\" substitutions may have occurred due to the inherent limitations of voice recognition software.  Read the chart carefully and recognize, using context, where substitutions have occurred. Please reach out to me directly for any clarifications.  "

## 2025-07-18 NOTE — TELEPHONE ENCOUNTER
Received a call from [place], pt has an scheduled appt today, questions about labwork, advised nothing is currently ordered as of now. Can be discuss at office visit today.

## 2025-07-18 NOTE — ASSESSMENT & PLAN NOTE
Prior history of sinus bradycardia related to medications.  Verapamil was stopped in 2022.  Currently on no AV antolin blocking drugs.  Heart rate between 60 and 70.  Asymptomatic from this.  EKG: NSR RBBB    Orders:    POCT ECG    Echo complete w/ contrast if indicated; Future

## 2025-08-07 ENCOUNTER — OFFICE VISIT (OUTPATIENT)
Dept: VASCULAR SURGERY | Facility: CLINIC | Age: 80
End: 2025-08-07
Payer: MEDICARE

## 2025-08-07 VITALS
HEIGHT: 71 IN | HEART RATE: 70 BPM | WEIGHT: 196.2 LBS | SYSTOLIC BLOOD PRESSURE: 110 MMHG | RESPIRATION RATE: 16 BRPM | OXYGEN SATURATION: 98 % | BODY MASS INDEX: 27.47 KG/M2 | DIASTOLIC BLOOD PRESSURE: 58 MMHG

## 2025-08-07 DIAGNOSIS — I71.40 ABDOMINAL AORTIC ANEURYSM (AAA) WITHOUT RUPTURE, UNSPECIFIED PART (HCC): Primary | ICD-10-CM

## 2025-08-07 DIAGNOSIS — I71.20 THORACIC AORTIC ANEURYSM WITHOUT RUPTURE, UNSPECIFIED PART (HCC): ICD-10-CM

## 2025-08-07 DIAGNOSIS — I73.9 PAD (PERIPHERAL ARTERY DISEASE) (HCC): ICD-10-CM

## 2025-08-07 DIAGNOSIS — I71.019 DISSECTION OF THORACIC AORTA, UNSPECIFIED PART (HCC): ICD-10-CM

## 2025-08-07 DIAGNOSIS — I65.23 BILATERAL CAROTID ARTERY STENOSIS: ICD-10-CM

## 2025-08-07 DIAGNOSIS — I72.4 ANEURYSM OF POPLITEAL ARTERY (HCC): ICD-10-CM

## 2025-08-07 DIAGNOSIS — I65.23 ATHEROSCLEROSIS OF BOTH CAROTID ARTERIES: ICD-10-CM

## 2025-08-07 PROCEDURE — 99213 OFFICE O/P EST LOW 20 MIN: CPT | Performed by: NURSE PRACTITIONER

## 2025-08-21 ENCOUNTER — HOSPITAL ENCOUNTER (OUTPATIENT)
Dept: NON INVASIVE DIAGNOSTICS | Facility: HOSPITAL | Age: 80
Discharge: HOME | End: 2025-08-21
Attending: INTERNAL MEDICINE
Payer: MEDICARE

## 2025-08-21 VITALS
BODY MASS INDEX: 27.44 KG/M2 | WEIGHT: 196 LBS | HEART RATE: 70 BPM | DIASTOLIC BLOOD PRESSURE: 58 MMHG | SYSTOLIC BLOOD PRESSURE: 110 MMHG | HEIGHT: 71 IN

## 2025-08-21 DIAGNOSIS — I35.1 NONRHEUMATIC AORTIC VALVE INSUFFICIENCY: ICD-10-CM

## 2025-08-21 DIAGNOSIS — R00.1 BRADYCARDIA: ICD-10-CM

## 2025-08-21 LAB
AORTIC ROOT: 3.8 CM
AORTIC VALVE MEAN VELOCITY: 15.2 M/S
ASCENDING AORTA: 4.3 CM
AV AREA BY CONTINUOUS VTI: 2 CM2
AV AREA PEAK VELOCITY: 1.8 CM2
AV LVOT MEAN GRADIENT: 3 MMHG
AV LVOT PEAK GRADIENT: 6 MMHG
AV MEAN PRESS GRAD SYS DOP V1V2: 11 MMHG
AV ORIFICE AREA US: 1.97 CM2
AV PEAK GRADIENT: 20 MMHG
AV REGURGITATION PRESSURE HALF TIME: 472 MS
AV VELOCITY RATIO: 0.57
AV VMAX SYS DOP: 2.21 M/S
BSA FOR ECHO PROCEDURE: 2.09 M2
DOP CALC AO VTI: 52.15 CM
DOP CALC LVOT AREA: 3.46 CM2
DOP CALC LVOT CARDIAC INDEX: 3.02 L/MIN/M2
DOP CALC LVOT CARDIAC OUTPUT: 6.31 L/MIN
DOP CALC LVOT DIAMETER: 2.1 CM
DOP CALC LVOT PEAK VEL VTI: 29.65 CM
DOP CALC LVOT PEAK VEL: 1.18 M/S
DOP CALC LVOT STROKE INDEX: 48.3 ML/M2
DOP CALC LVOT STROKE VOLUME: 102.64
E WAVE DECELERATION TIME: 193 MS
E/A RATIO: 0.83
FRACTIONAL SHORTENING: 36 (ref 28–44)
INTERVENTRICULAR SEPTUM IN DIASTOLE (PARASTERNAL SHORT AXIS VIEW): 1 CM
INTERVENTRICULAR SEPTUM: 1 CM (ref 0.6–1.1)
LAAS-AP2: 18.7 CM2
LAAS-AP4: 20.9 CM2
LEFT ATRIUM SIZE: 4.2 CM
LEFT ATRIUM VOLUME (MOD BIPLANE): 55 ML
LEFT ATRIUM VOLUME INDEX (MOD BIPLANE): 26.3 ML/M2
LEFT INTERNAL DIMENSION IN SYSTOLE: 3.6 CM (ref 2.1–4)
LEFT VENTRICULAR INTERNAL DIMENSION IN DIASTOLE: 5.6 CM (ref 3.5–6)
LEFT VENTRICULAR POSTERIOR WALL IN END DIASTOLE: 1 CM
LEFT VENTRICULAR STROKE VOLUME: 101 ML
LV EF US.2D.A4C+ESTIMATED: 61 %
LVSV (TEICH): 101 ML
MV E'TISSUE VEL-SEP: 6 CM/S
MV PEAK A VEL: 0.69 M/S
MV PEAK E VEL: 57 CM/S
MV STENOSIS PRESSURE HALF TIME: 56 MS
MV VALVE AREA P 1/2 METHOD: 3.93
RA PRESSURE ESTIMATED: 5 MMHG
RIGHT ATRIAL 2D VOLUME: 39 ML
RIGHT ATRIUM AREA SYSTOLE A4C: 17.1 CM2
RIGHT VENTRICLE ID DIMENSION: 2.9 CM
RV PSP: 40 MMHG
SINOTUBULAR JUNCTION: 3.4 CM
SL CV AV DECELERATION TIME RETROGRADE: 1626 MS
SL CV AV PEAK GRADIENT RETROGRADE: 75 MMHG
SL CV LEFT ATRIUM LENGTH A2C: 5.6 CM
SL CV LV EF: 65
SL CV PED ECHO LEFT VENTRICLE DIASTOLIC VOLUME (MOD BIPLANE) 2D: 155 ML
SL CV PED ECHO LEFT VENTRICLE SYSTOLIC VOLUME (MOD BIPLANE) 2D: 54 ML
SL CV SINUS OF VALSALVA 2D: 4.1 CM
STJ: 3.4 CM
TR MAX PG: 35 MMHG
TR PEAK VELOCITY: 3 M/S
TRICUSPID ANNULAR PLANE SYSTOLIC EXCURSION: 1.8 CM
TRICUSPID VALVE PEAK REGURGITATION VELOCITY: 2.96 M/S

## 2025-08-21 PROCEDURE — 93306 TTE W/DOPPLER COMPLETE: CPT

## 2025-08-21 PROCEDURE — 93306 TTE W/DOPPLER COMPLETE: CPT | Performed by: INTERNAL MEDICINE
